# Patient Record
Sex: FEMALE | Race: WHITE | NOT HISPANIC OR LATINO | URBAN - METROPOLITAN AREA
[De-identification: names, ages, dates, MRNs, and addresses within clinical notes are randomized per-mention and may not be internally consistent; named-entity substitution may affect disease eponyms.]

---

## 2017-06-04 ENCOUNTER — EMERGENCY (EMERGENCY)
Facility: HOSPITAL | Age: 77
LOS: 0 days | Discharge: HOME | End: 2017-06-04
Attending: EMERGENCY MEDICINE | Admitting: EMERGENCY MEDICINE
Payer: MEDICARE

## 2017-06-04 VITALS
SYSTOLIC BLOOD PRESSURE: 138 MMHG | RESPIRATION RATE: 18 BRPM | HEIGHT: 62 IN | HEART RATE: 86 BPM | DIASTOLIC BLOOD PRESSURE: 76 MMHG | WEIGHT: 220.02 LBS | TEMPERATURE: 99 F | OXYGEN SATURATION: 97 %
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
## 2017-06-04 DIAGNOSIS — S09.90XA UNSPECIFIED INJURY OF HEAD, INITIAL ENCOUNTER: ICD-10-CM

## 2017-06-04 DIAGNOSIS — Z96.651 PRESENCE OF RIGHT ARTIFICIAL KNEE JOINT: Chronic | ICD-10-CM

## 2017-06-04 DIAGNOSIS — S00.81XA ABRASION OF OTHER PART OF HEAD, INITIAL ENCOUNTER: ICD-10-CM
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
## 2017-06-04 DIAGNOSIS — S80.212A ABRASION, LEFT KNEE, INITIAL ENCOUNTER: ICD-10-CM

## 2017-06-04 DIAGNOSIS — S20.212A CONTUSION OF LEFT FRONT WALL OF THORAX, INITIAL ENCOUNTER: ICD-10-CM
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
## 2017-06-04 DIAGNOSIS — S60.222A CONTUSION OF LEFT HAND, INITIAL ENCOUNTER: ICD-10-CM

## 2017-06-04 LAB
APPEARANCE UR: CLEAR — SIGNIFICANT CHANGE UP
BACTERIA # UR AUTO: (no result)
BILIRUB UR-MCNC: NEGATIVE — SIGNIFICANT CHANGE UP
COLOR SPEC: YELLOW — SIGNIFICANT CHANGE UP
DIFF PNL FLD: NEGATIVE — SIGNIFICANT CHANGE UP
EPI CELLS # UR: SIGNIFICANT CHANGE UP
GLUCOSE UR QL: NEGATIVE MG/DL — SIGNIFICANT CHANGE UP
KETONES UR-MCNC: (no result)
LEUKOCYTE ESTERASE UR-ACNC: (no result)
NITRITE UR-MCNC: NEGATIVE — SIGNIFICANT CHANGE UP
PH UR: 7 — SIGNIFICANT CHANGE UP (ref 5–8)
PROT UR-MCNC: NEGATIVE MG/DL — SIGNIFICANT CHANGE UP
RBC CASTS # UR COMP ASSIST: NEGATIVE /HPF — SIGNIFICANT CHANGE UP (ref 0–4)
SP GR SPEC: 1.01 — SIGNIFICANT CHANGE UP (ref 1.01–1.02)
UROBILINOGEN FLD QL: NEGATIVE MG/DL — SIGNIFICANT CHANGE UP
WBC UR QL: SIGNIFICANT CHANGE UP

## 2017-06-04 PROCEDURE — 76377 3D RENDER W/INTRP POSTPROCES: CPT | Mod: 26

## 2017-06-04 PROCEDURE — 12011 RPR F/E/E/N/L/M 2.5 CM/<: CPT | Mod: LT

## 2017-06-04 PROCEDURE — 72125 CT NECK SPINE W/O DYE: CPT | Mod: 26

## 2017-06-04 PROCEDURE — 70450 CT HEAD/BRAIN W/O DYE: CPT | Mod: 26

## 2017-06-04 PROCEDURE — 71101 X-RAY EXAM UNILAT RIBS/CHEST: CPT | Mod: 26

## 2017-06-04 PROCEDURE — 73130 X-RAY EXAM OF HAND: CPT | Mod: 26,LT

## 2017-06-04 PROCEDURE — 99284 EMERGENCY DEPT VISIT MOD MDM: CPT

## 2017-06-04 RX ORDER — TETANUS TOXOID, REDUCED DIPHTHERIA TOXOID AND ACELLULAR PERTUSSIS VACCINE, ADSORBED 5; 2.5; 8; 8; 2.5 [IU]/.5ML; [IU]/.5ML; UG/.5ML; UG/.5ML; UG/.5ML
0.5 SUSPENSION INTRAMUSCULAR ONCE
Qty: 0 | Refills: 0 | Status: COMPLETED | OUTPATIENT
Start: 2017-06-04 | End: 2017-06-04

## 2017-06-04 RX ADMIN — TETANUS TOXOID, REDUCED DIPHTHERIA TOXOID AND ACELLULAR PERTUSSIS VACCINE, ADSORBED 0.5 MILLILITER(S): 5; 2.5; 8; 8; 2.5 SUSPENSION INTRAMUSCULAR at 15:30

## 2017-06-04 NOTE — ED PROVIDER NOTE - CONSTITUTIONAL, MLM
normal... Well appearing, well nourished, elderly white female, awake, alert, oriented to person, place, time/situation and in no apparent distress. No respiratory discomfort. Non-toxic.

## 2017-06-04 NOTE — ED PROVIDER NOTE - CARE PLAN
Principal Discharge DX:	Injury of head, initial encounter  Secondary Diagnosis:	Contusion of rib, left, initial encounter  Secondary Diagnosis:	Abrasions of multiple sites

## 2017-06-04 NOTE — ED PROVIDER NOTE - NS ED MD SCRIBE ATTENDING SCRIBE SECTIONS
PHYSICAL EXAM/PROGRESS NOTE/DISPOSITION/HISTORY OF PRESENT ILLNESS/PAST MEDICAL/SURGICAL/SOCIAL HISTORY/RESULTS/REVIEW OF SYSTEMS

## 2017-06-04 NOTE — ED PROVIDER NOTE - CARDIAC, MLM
Normal rate, regular rhythm.  Heart sounds S1, S2.  No murmurs, rubs or gallops. Radial pulse is 2+. Normal rate, regular rhythm.  Heart sounds S1, S2.  No murmurs, rubs or gallops. Radial pulse is 2+. DP pulse is 2+.

## 2017-06-04 NOTE — ED PROVIDER NOTE - OBJECTIVE STATEMENT
76 y/o F with hx of HTN and right knee replacement presents to the ED s/p mechanical fall. Pt states she [place] and tripped on uneven pavement and hit her head. Denies LOC. Pt c/o left hand pain, left forehead abrasion, and left knee abrasion. Pt able to get herself up and ambulate after fall. Currently pt has no other complaints and denies nausea, blurry vision, CP, and SOB.

## 2017-06-04 NOTE — ED PROVIDER NOTE - ENMT, MLM
Airway patent, Nasal mucosa clear. Mouth with mildly dry mucosa. Throat has no vesicles, no oropharyngeal exudates and uvula is midline. +Dentures. No other clinical evidence of facial injury. Neck is supple, non-tender.

## 2017-06-04 NOTE — ED PROVIDER NOTE - DETAILS:
The scribe's documentation has been prepared under my direction and personally reviewed by me in its entirety. I confirm that the note above accurately reflects all work, treatment, procedures, and medical decision making performed by me (Dr. Rodríguez).

## 2017-06-04 NOTE — ED PROVIDER NOTE - HEAD, MLM
Head is atraumatic. Head shape is symmetrical. +Left forehead with mild swelling and superficial abrasions and small cut to lateral aspect of left eyebrow, mild oozing of blood.

## 2017-06-04 NOTE — ED PROVIDER NOTE - PROGRESS NOTE DETAILS
Dr. Rodríguez:  Wound closure by PA appreciated.  Pt self-ambulatory w/ steady gait, no pain.  Will D/C for PCP f/u.

## 2017-06-04 NOTE — ED PROVIDER NOTE - MEDICAL DECISION MAKING DETAILS
Elderly F presents s/p mechanical fall with forehead injury, contusion to left knee, left hand, left chest wall with associated abrasions, no LOC, no anticoags/asa. CT head, xrays of chest, ribs, left hand (pt refuses knee xray), TDAP, wound closure left eyebrow region.

## 2017-06-04 NOTE — ED PROVIDER NOTE - MUSCULOSKELETAL, MLM
+Lateral left and inferior rib tenderness. Spine appears normal, no midline CTL spine tenderness, range of motion is not limited, no muscle or joint tenderness. MAEx4. No posterior thoracic wall tenderness, nor deformity. +Lateral left and inferior rib tenderness. Spine appears normal, no midline CTL spine tenderness, range of motion is not limited, no muscle or joint tenderness. MAEx4. No posterior thoracic wall tenderness, nor deformity. B/l SLR 45 degrees without pain. Active FROM of b/l knees, non-tender, joint stable. Pelvis is stable and non-tender.

## 2017-06-04 NOTE — ED PROVIDER NOTE - SKIN, MLM
Skin normal color for race, warm, dry and intact. No evidence of rash. +Superficial abrasions to left knee. +Abrasions dorsum of left hand overlying MCP joint 3, 4, and 5. No evidence of rash.

## 2018-03-30 ENCOUNTER — EMERGENCY (EMERGENCY)
Facility: HOSPITAL | Age: 78
LOS: 1 days | Discharge: HOME | End: 2018-03-30
Attending: EMERGENCY MEDICINE
Payer: COMMERCIAL

## 2018-03-30 VITALS
WEIGHT: 184.97 LBS | SYSTOLIC BLOOD PRESSURE: 149 MMHG | OXYGEN SATURATION: 98 % | DIASTOLIC BLOOD PRESSURE: 82 MMHG | TEMPERATURE: 98 F | HEART RATE: 121 BPM | RESPIRATION RATE: 18 BRPM

## 2018-03-30 VITALS
SYSTOLIC BLOOD PRESSURE: 141 MMHG | DIASTOLIC BLOOD PRESSURE: 79 MMHG | RESPIRATION RATE: 20 BRPM | HEART RATE: 89 BPM | OXYGEN SATURATION: 94 %

## 2018-03-30 DIAGNOSIS — Z96.651 PRESENCE OF RIGHT ARTIFICIAL KNEE JOINT: Chronic | ICD-10-CM

## 2018-03-30 LAB
ALBUMIN SERPL ELPH-MCNC: 4.4 G/DL — SIGNIFICANT CHANGE UP (ref 3.3–5)
ALP SERPL-CCNC: 44 U/L — SIGNIFICANT CHANGE UP (ref 40–120)
ALT FLD-CCNC: 14 U/L RC — SIGNIFICANT CHANGE UP (ref 10–45)
ANION GAP SERPL CALC-SCNC: 14 MMOL/L — SIGNIFICANT CHANGE UP (ref 5–17)
APTT BLD: 31.4 SEC — SIGNIFICANT CHANGE UP (ref 27.5–37.4)
AST SERPL-CCNC: 22 U/L — SIGNIFICANT CHANGE UP (ref 10–40)
BASOPHILS # BLD AUTO: 0 K/UL — SIGNIFICANT CHANGE UP (ref 0–0.2)
BASOPHILS NFR BLD AUTO: 0.1 % — SIGNIFICANT CHANGE UP (ref 0–2)
BILIRUB SERPL-MCNC: 0.4 MG/DL — SIGNIFICANT CHANGE UP (ref 0.2–1.2)
BUN SERPL-MCNC: 17 MG/DL — SIGNIFICANT CHANGE UP (ref 7–23)
CALCIUM SERPL-MCNC: 9.4 MG/DL — SIGNIFICANT CHANGE UP (ref 8.4–10.5)
CHLORIDE SERPL-SCNC: 97 MMOL/L — SIGNIFICANT CHANGE UP (ref 96–108)
CO2 SERPL-SCNC: 25 MMOL/L — SIGNIFICANT CHANGE UP (ref 22–31)
CREAT SERPL-MCNC: 0.59 MG/DL — SIGNIFICANT CHANGE UP (ref 0.5–1.3)
EOSINOPHIL # BLD AUTO: 0 K/UL — SIGNIFICANT CHANGE UP (ref 0–0.5)
EOSINOPHIL NFR BLD AUTO: 0.3 % — SIGNIFICANT CHANGE UP (ref 0–6)
GLUCOSE SERPL-MCNC: 126 MG/DL — HIGH (ref 70–99)
HCT VFR BLD CALC: 41.7 % — SIGNIFICANT CHANGE UP (ref 34.5–45)
HGB BLD-MCNC: 14 G/DL — SIGNIFICANT CHANGE UP (ref 11.5–15.5)
INR BLD: 1 RATIO — SIGNIFICANT CHANGE UP (ref 0.88–1.16)
LACTATE BLDV-MCNC: 1.5 MMOL/L — SIGNIFICANT CHANGE UP (ref 0.7–2)
LYMPHOCYTES # BLD AUTO: 0.8 K/UL — LOW (ref 1–3.3)
LYMPHOCYTES # BLD AUTO: 7.5 % — LOW (ref 13–44)
MCHC RBC-ENTMCNC: 31 PG — SIGNIFICANT CHANGE UP (ref 27–34)
MCHC RBC-ENTMCNC: 33.5 GM/DL — SIGNIFICANT CHANGE UP (ref 32–36)
MCV RBC AUTO: 92.4 FL — SIGNIFICANT CHANGE UP (ref 80–100)
MONOCYTES # BLD AUTO: 0.4 K/UL — SIGNIFICANT CHANGE UP (ref 0–0.9)
MONOCYTES NFR BLD AUTO: 3.4 % — SIGNIFICANT CHANGE UP (ref 2–14)
NEUTROPHILS # BLD AUTO: 9.8 K/UL — HIGH (ref 1.8–7.4)
NEUTROPHILS NFR BLD AUTO: 88.8 % — HIGH (ref 43–77)
PLATELET # BLD AUTO: 227 K/UL — SIGNIFICANT CHANGE UP (ref 150–400)
POTASSIUM SERPL-MCNC: 3.7 MMOL/L — SIGNIFICANT CHANGE UP (ref 3.5–5.3)
POTASSIUM SERPL-SCNC: 3.7 MMOL/L — SIGNIFICANT CHANGE UP (ref 3.5–5.3)
PROT SERPL-MCNC: 7.5 G/DL — SIGNIFICANT CHANGE UP (ref 6–8.3)
PROTHROM AB SERPL-ACNC: 10.9 SEC — SIGNIFICANT CHANGE UP (ref 9.8–12.7)
RBC # BLD: 4.52 M/UL — SIGNIFICANT CHANGE UP (ref 3.8–5.2)
RBC # FLD: 12.1 % — SIGNIFICANT CHANGE UP (ref 10.3–14.5)
SODIUM SERPL-SCNC: 136 MMOL/L — SIGNIFICANT CHANGE UP (ref 135–145)
WBC # BLD: 11 K/UL — HIGH (ref 3.8–10.5)
WBC # FLD AUTO: 11 K/UL — HIGH (ref 3.8–10.5)

## 2018-03-30 PROCEDURE — 74177 CT ABD & PELVIS W/CONTRAST: CPT | Mod: 26

## 2018-03-30 PROCEDURE — 99284 EMERGENCY DEPT VISIT MOD MDM: CPT

## 2018-03-30 NOTE — ED PROVIDER NOTE - MEDICAL DECISION MAKING DETAILS
Att - fall onto R side 2nd to acute vertigo - pain but no tenderness R flank. ? hepatic injury. --> CT Ab/Pel, labs.

## 2018-03-30 NOTE — ED PROVIDER NOTE - PHYSICAL EXAMINATION
Attn - alert, mod-severe pain.  Head - NC/AT, PERRL, moist mm, lungs-, no splinting, cor - rr, no M, chest/ribs - nontender to compression.  abdo - soft, NT, ND, obese.  Pelvis - NT, Back - NT.  Pain R flank and RUQ without tenderness.  Upper ext-, lower ext-, neuro - intact and nonfocal, no vertigo now, no nystagmus.

## 2018-03-30 NOTE — ED PROVIDER NOTE - OBJECTIVE STATEMENT
77F PMH HTN, Cervical spondylosis melanoma s/p resection, vertigo s/p vestibular therapy, R knee replacement who presents 77F PMH HTN, Cervical spondylosis melanoma s/p resection, vertigo s/p vestibular therapy, R knee replacement who presents s/p fall with R sided pain. She went to [facility] this afternoon and came home and developed acute vertigo at home and fell on the floor. She endorsed worsening R sided pain since that time. Her vertigo has resolved. She primarily endorses pain in the abdomen more-so than the bones/hips. Denies fevers/chills, incontinence, focal weakness.    PMD: Dr. Estiven Glover  Meds: Amlodipine, trazadone, paroxitine, calcium carbonate 77F PMH HTN, Cervical spondylosis melanoma s/p resection, vertigo s/p vestibular therapy, R knee replacement who presents s/p fall with R sided pain. She went to [facility] this afternoon and came home and developed acute vertigo at home and fell on the floor. She endorsed worsening R sided pain since that time. Her vertigo has resolved. She primarily endorses pain in the abdomen more-so than the bones/hips. Denies fevers/chills, incontinence, focal weakness.    PMD: Dr. Estiven Glover  Meds: Amlodipine, trazadone, paroxitine, calcium carbonate      Attn - pt seen in [facility] - pt came home from [facility] and became acutely vertiginous and fell onto R side approx 5pm today.  Pt c/o severe pain r flank and exac by mvm.  no change with respiration.  NO: head, neck, spine, lower ext or upper ext pain.  Hx of vertigo lasting mins to week.  previously had vestibular therapy Rx by PMD - Donna Edmonds.  NO: cp, sob, palp, h/a, n/v.

## 2018-03-30 NOTE — ED ADULT NURSE NOTE - OBJECTIVE STATEMENT
Presents c/o right ribcage pain s/p fall. Pt states she had  a "vertigo attack" and fell. Reports sudden onset of dizziness immediately preceding fall. Pt now c/o right sided body pain increasing with movement. Pt A&Ox3. LEONARD. Denies cp/sob. Respirations even/unlabored. Abd soft. +ttp. No n/v/d. Denies urinary symptoms. Skin WDI. Awaiting CT scan and dispo.

## 2018-03-30 NOTE — ED PROVIDER NOTE - PMH
Anxiety    Cataract    [place] (hard of hearing) right ear    HTN (hypertension)    HTN (hypertension)    Obesity Anxiety    Cataract    [place] (hard of hearing) right ear    HTN (hypertension)    HTN (hypertension)    Obesity

## 2018-03-30 NOTE — ED ADULT NURSE NOTE - PMH
Anxiety    Cataract    [place] (hard of hearing) right ear    HTN (hypertension)    HTN (hypertension)    Obesity

## 2018-03-30 NOTE — ED PROVIDER NOTE - PSH
H/O total knee replacement, right    surgical repair of left Rotator cuff 2009    surgical repair of right foot Bunion and hammer toe 2010    wide excision of left thigh Melanoma with removal of left groin lymh nodes  1980

## 2018-03-30 NOTE — ED PROVIDER NOTE - PROGRESS NOTE DETAILS
Will order CT A/P to rule out any intra-abdomenal traumatic pathology Scans are negative, labs wnl. Will have patient f/u with PMD and give strict return precautions. -SM

## 2018-03-31 PROCEDURE — 74177 CT ABD & PELVIS W/CONTRAST: CPT

## 2018-03-31 PROCEDURE — 85027 COMPLETE CBC AUTOMATED: CPT

## 2018-03-31 PROCEDURE — 85730 THROMBOPLASTIN TIME PARTIAL: CPT

## 2018-03-31 PROCEDURE — 80053 COMPREHEN METABOLIC PANEL: CPT

## 2018-03-31 PROCEDURE — 82803 BLOOD GASES ANY COMBINATION: CPT

## 2018-03-31 PROCEDURE — 85610 PROTHROMBIN TIME: CPT

## 2018-03-31 PROCEDURE — 83605 ASSAY OF LACTIC ACID: CPT

## 2018-03-31 PROCEDURE — 99284 EMERGENCY DEPT VISIT MOD MDM: CPT | Mod: 25

## 2018-03-31 PROCEDURE — 96374 THER/PROPH/DIAG INJ IV PUSH: CPT | Mod: XU

## 2018-03-31 RX ORDER — FAMOTIDINE 10 MG/ML
20 INJECTION INTRAVENOUS ONCE
Qty: 0 | Refills: 0 | Status: COMPLETED | OUTPATIENT
Start: 2018-03-31 | End: 2018-03-31

## 2018-03-31 RX ADMIN — FAMOTIDINE 20 MILLIGRAM(S): 10 INJECTION INTRAVENOUS at 01:17

## 2018-08-27 NOTE — ED ADULT NURSE NOTE - RESPIRATORY WDL
Breathing spontaneous and unlabored. Breath sounds clear and equal bilaterally with regular rhythm. Implemented All Universal Safety Interventions:  [place] to call system. Call bell, personal items and telephone within reach. Instruct patient to call for assistance. Room bathroom lighting operational. Non-slip footwear when patient is off stretcher. Physically safe environment: no spills, clutter or unnecessary equipment. Stretcher in lowest position, wheels locked, appropriate side rails in place.

## 2019-09-23 ENCOUNTER — RESULT REVIEW (OUTPATIENT)
Age: 79
End: 2019-09-23

## 2019-11-30 NOTE — ED ADULT NURSE NOTE - NURSING SKIN WOUND TYPE #1
The following letter pertains to your most recent diagnostic tests:    Iron stores are adequate.    Triglycerides are improved.  Keep taking the fenofibrate as you are.        Sincerely,    Dr. Hollingsworth
hematoma

## 2020-10-28 ENCOUNTER — RESULT REVIEW (OUTPATIENT)
Age: 80
End: 2020-10-28

## 2022-01-07 ENCOUNTER — INPATIENT (INPATIENT)
Facility: HOSPITAL | Age: 82
LOS: 4 days | Discharge: HOME | DRG: 42 | End: 2022-01-12
Attending: HOSPITALIST | Admitting: STUDENT IN AN ORGANIZED HEALTH CARE EDUCATION/TRAINING PROGRAM
Payer: MEDICARE

## 2022-01-07 VITALS
TEMPERATURE: 98 F | SYSTOLIC BLOOD PRESSURE: 147 MMHG | HEIGHT: 62 IN | WEIGHT: 195.11 LBS | DIASTOLIC BLOOD PRESSURE: 77 MMHG | OXYGEN SATURATION: 95 % | RESPIRATION RATE: 20 BRPM | HEART RATE: 76 BPM

## 2022-01-07 DIAGNOSIS — Z96.651 PRESENCE OF RIGHT ARTIFICIAL KNEE JOINT: Chronic | ICD-10-CM

## 2022-01-07 PROBLEM — I10 ESSENTIAL (PRIMARY) HYPERTENSION: Chronic | Status: ACTIVE | Noted: 2017-06-04

## 2022-01-07 LAB
BASOPHILS # BLD AUTO: 0.03 K/UL — SIGNIFICANT CHANGE UP (ref 0–0.2)
BASOPHILS NFR BLD AUTO: 0.5 % — SIGNIFICANT CHANGE UP (ref 0–2)
EOSINOPHIL # BLD AUTO: 0.16 K/UL — SIGNIFICANT CHANGE UP (ref 0–0.5)
EOSINOPHIL NFR BLD AUTO: 2.6 % — SIGNIFICANT CHANGE UP (ref 0–6)
HCT VFR BLD CALC: 35.1 % — SIGNIFICANT CHANGE UP (ref 34.5–45)
HGB BLD-MCNC: 11.3 G/DL — LOW (ref 11.5–15.5)
IMM GRANULOCYTES NFR BLD AUTO: 0.2 % — SIGNIFICANT CHANGE UP (ref 0–1.5)
LYMPHOCYTES # BLD AUTO: 0.96 K/UL — LOW (ref 1–3.3)
LYMPHOCYTES # BLD AUTO: 15.8 % — SIGNIFICANT CHANGE UP (ref 13–44)
MCHC RBC-ENTMCNC: 30.4 PG — SIGNIFICANT CHANGE UP (ref 27–34)
MCHC RBC-ENTMCNC: 32.2 GM/DL — SIGNIFICANT CHANGE UP (ref 32–36)
MCV RBC AUTO: 94.4 FL — SIGNIFICANT CHANGE UP (ref 80–100)
MONOCYTES # BLD AUTO: 0.85 K/UL — SIGNIFICANT CHANGE UP (ref 0–0.9)
MONOCYTES NFR BLD AUTO: 14 % — SIGNIFICANT CHANGE UP (ref 2–14)
NEUTROPHILS # BLD AUTO: 4.06 K/UL — SIGNIFICANT CHANGE UP (ref 1.8–7.4)
NEUTROPHILS NFR BLD AUTO: 66.9 % — SIGNIFICANT CHANGE UP (ref 43–77)
NRBC # BLD: 0 /100 WBCS — SIGNIFICANT CHANGE UP (ref 0–0)
PLATELET # BLD AUTO: 187 K/UL — SIGNIFICANT CHANGE UP (ref 150–400)
RBC # BLD: 3.72 M/UL — LOW (ref 3.8–5.2)
RBC # FLD: 13.2 % — SIGNIFICANT CHANGE UP (ref 10.3–14.5)
WBC # BLD: 6.07 K/UL — SIGNIFICANT CHANGE UP (ref 3.8–10.5)
WBC # FLD AUTO: 6.07 K/UL — SIGNIFICANT CHANGE UP (ref 3.8–10.5)

## 2022-01-07 PROCEDURE — 73630 X-RAY EXAM OF FOOT: CPT | Mod: 26,LT

## 2022-01-07 PROCEDURE — 70450 CT HEAD/BRAIN W/O DYE: CPT | Mod: 26,MA

## 2022-01-07 PROCEDURE — 73030 X-RAY EXAM OF SHOULDER: CPT | Mod: 26,RT

## 2022-01-07 PROCEDURE — 70486 CT MAXILLOFACIAL W/O DYE: CPT | Mod: 26,MA

## 2022-01-07 PROCEDURE — 93010 ELECTROCARDIOGRAM REPORT: CPT

## 2022-01-07 PROCEDURE — 76377 3D RENDER W/INTRP POSTPROCES: CPT | Mod: 26

## 2022-01-07 PROCEDURE — 99285 EMERGENCY DEPT VISIT HI MDM: CPT

## 2022-01-07 PROCEDURE — 72125 CT NECK SPINE W/O DYE: CPT | Mod: 26,MA

## 2022-01-07 RX ORDER — ACETAMINOPHEN 500 MG
975 TABLET ORAL ONCE
Refills: 0 | Status: COMPLETED | OUTPATIENT
Start: 2022-01-07 | End: 2022-01-07

## 2022-01-07 RX ORDER — TETANUS TOXOID, REDUCED DIPHTHERIA TOXOID AND ACELLULAR PERTUSSIS VACCINE, ADSORBED 5; 2.5; 8; 8; 2.5 [IU]/.5ML; [IU]/.5ML; UG/.5ML; UG/.5ML; UG/.5ML
0.5 SUSPENSION INTRAMUSCULAR ONCE
Refills: 0 | Status: COMPLETED | OUTPATIENT
Start: 2022-01-07 | End: 2022-01-07

## 2022-01-07 NOTE — ED PROVIDER NOTE - PROGRESS NOTE DETAILS
Scott Vargas PGY2: Pt mildly unsteady on feet s/p concussion, lives with  who has Alzheimer's. Pt accepted for admission to hospitalist.

## 2022-01-07 NOTE — ED PROVIDER NOTE - ATTENDING CONTRIBUTION TO CARE
------------ATTENDING NOTE------------  pt c/o mechanical trip/fall > 24 hrs ago, hit face on ground, no LOC, ambulatory afterward, c/o mild dull throbbing pain in face w/ bruising/swelling, no visual changes, no neck/back pain, no chest pain/discomfort or sob/dyspnea or palpitations, nml neuro exam, nml cardiac exam, equal distal pulses, soft benign abd, stable chest w/o crepitus/distress, ambulating in ED w/o assistance but unsteady -->  - Tony Quintero MD   ---------------------------------------------- ------------ATTENDING NOTE------------  pt c/o mechanical trip/fall > 24 hrs ago, hit face on ground, no LOC, ambulatory afterward, c/o mild dull throbbing pain in face w/ bruising/swelling, no visual changes, no neck/back pain, no chest pain/discomfort or sob/dyspnea or palpitations, nml neuro exam, nml cardiac exam, equal distal pulses, soft benign abd, stable chest w/o crepitus/distress, ambulating in ED w/o assistance but unsteady -->   - Tony Quintero MD   ---------------------------------------------- ------------ATTENDING NOTE------------  pt c/o mechanical trip/fall > 24 hrs ago, hit face on ground, no LOC, ambulatory afterward, c/o mild dull throbbing pain in face w/ bruising/swelling, no visual changes, no neck/back pain, no chest pain/discomfort or sob/dyspnea or palpitations, nml neuro exam, nml cardiac exam, equal distal pulses, soft benign abd, stable chest w/o crepitus/distress, ambulating in ED w/o assistance but unsteady -->  pt w/ concussion and L 1st toe fx, she is at home alone and daughter unable to care for her, needing admission for PT/OT, close reassessments, outpt needs assessments.  - Tony Quintero MD   ----------------------------------------------

## 2022-01-07 NOTE — ED PROVIDER NOTE - CARE PLAN
Principal Discharge DX:	Head injury, closed, with concussion  Secondary Diagnosis:	Contusion of shoulder, right  Secondary Diagnosis:	Sprain of left foot   1 Principal Discharge DX:	Head injury, closed, with concussion  Secondary Diagnosis:	Contusion of shoulder, right  Secondary Diagnosis:	Sprain of left foot  Secondary Diagnosis:	Fracture of left great toe

## 2022-01-07 NOTE — ED ADULT NURSE NOTE - NSICDXPASTMEDICALHX_GEN_ALL_CORE_FT
PAST MEDICAL HISTORY:  Anxiety     Cataract     [place] (hard of hearing) right ear     HTN (hypertension)     HTN (hypertension)     Obesity

## 2022-01-07 NOTE — ED ADULT NURSE NOTE - NSICDXPASTSURGICALHX_GEN_ALL_CORE_FT
PAST SURGICAL HISTORY:  H/O total knee replacement, right     surgical repair of left Rotator cuff 2009     surgical repair of right foot Bunion and hammer toe 2010     wide excision of left thigh Melanoma with removal of left groin lymh nodes 1980

## 2022-01-07 NOTE — ED PROVIDER NOTE - OBJECTIVE STATEMENT
82yo F HTN, obesity comes to ED for fall. Fall 1/6 around 5pm (24 hrs ago). Was taking groceries downstairs and thinks she slipped down the stairs. Questionable LOC, was able to get up afterwards and ambulate. Came in today at insistence of her daughter.  Complaining of pain in her face w/ significant bruising. Denies HA, vomiting, change in vision, cp, sob, abdominal pain, or issues with urine or bowel. No recent illnesses. No AC.

## 2022-01-07 NOTE — ED PROVIDER NOTE - PHYSICAL EXAMINATION
General: NAD  HEENT: NC, PERRL, -cspine ttp +ecchymosis around bilateral orbits, EOMI, +mild ttp of the maxilla bilaterally and of the nasal bridge.  Cardiac: RRR, no murmurs, 2+ peripheral pulses  Chest: CTA, -clavicular ttp or deformities  Abdomen: soft, non-distended, bowel sounds present, no ttp, no rebound or guarding  Back: no midline tenderness  Extremities: no peripheral edema or leg size discrepancies, +stable pelvis, -tenderness of the greater trochanters.   Skin: no rashes  Neuro: AAOx4, cns intact, motor equal, sensory grossly intact, no dysmetria, antalgic gait w/ 1 person assistance, avoidant of L foot.  Psych: mood and affect appropriate

## 2022-01-07 NOTE — ED ADULT NURSE NOTE - OBJECTIVE STATEMENT
Pt A&Ox4, ambulatory at baseline. Pt presented to ED after fall yesterday. Pmhx HTN, anxiety, cataract, obesity. Pt reports yesterday evening she when trying to bring in groceries. Fell down a few stairs and hit face on ground. Denies LOC and was able to ambulate after fall without issue. Pt currently complains of pain and swelling of face. Pt denies CP, SON, N/V/D, dizziness, LOC, weakness, numbness, tingling, fever, chills, visual changes.

## 2022-01-07 NOTE — ED PROVIDER NOTE - NSFOLLOWUPINSTRUCTIONS_ED_ALL_ED_FT
See your Primary Doctor this week for follow up -- call to discuss.    Stay well hydrated, eat regular healthy meals, get plenty of rest, continue current medications.    Use Acetaminophen as directed for pain -- see medication warnings.    See CONCUSSION and FALL AFTERCARE information and return instructions given to you.    Seek immediate medical care for new/worsening symptoms/concerns..

## 2022-01-08 ENCOUNTER — TRANSCRIPTION ENCOUNTER (OUTPATIENT)
Age: 82
End: 2022-01-08

## 2022-01-08 DIAGNOSIS — S00.83XA CONTUSION OF OTHER PART OF HEAD, INITIAL ENCOUNTER: ICD-10-CM

## 2022-01-08 DIAGNOSIS — F41.9 ANXIETY DISORDER, UNSPECIFIED: ICD-10-CM

## 2022-01-08 DIAGNOSIS — S92.919A UNSPECIFIED FRACTURE OF UNSPECIFIED TOE(S), INITIAL ENCOUNTER FOR CLOSED FRACTURE: ICD-10-CM

## 2022-01-08 DIAGNOSIS — E04.9 NONTOXIC GOITER, UNSPECIFIED: ICD-10-CM

## 2022-01-08 DIAGNOSIS — S06.0X9A CONCUSSION WITH LOSS OF CONSCIOUSNESS OF UNSPECIFIED DURATION, INITIAL ENCOUNTER: ICD-10-CM

## 2022-01-08 DIAGNOSIS — I10 ESSENTIAL (PRIMARY) HYPERTENSION: ICD-10-CM

## 2022-01-08 DIAGNOSIS — E78.5 HYPERLIPIDEMIA, UNSPECIFIED: ICD-10-CM

## 2022-01-08 DIAGNOSIS — Z29.9 ENCOUNTER FOR PROPHYLACTIC MEASURES, UNSPECIFIED: ICD-10-CM

## 2022-01-08 LAB
ALBUMIN SERPL ELPH-MCNC: 4 G/DL — SIGNIFICANT CHANGE UP (ref 3.3–5)
ALBUMIN SERPL ELPH-MCNC: 4 G/DL — SIGNIFICANT CHANGE UP (ref 3.3–5)
ALP SERPL-CCNC: 50 U/L — SIGNIFICANT CHANGE UP (ref 40–120)
ALP SERPL-CCNC: 50 U/L — SIGNIFICANT CHANGE UP (ref 40–120)
ALT FLD-CCNC: 26 U/L — SIGNIFICANT CHANGE UP (ref 10–45)
ALT FLD-CCNC: 28 U/L — SIGNIFICANT CHANGE UP (ref 10–45)
ANION GAP SERPL CALC-SCNC: 11 MMOL/L — SIGNIFICANT CHANGE UP (ref 5–17)
ANION GAP SERPL CALC-SCNC: 13 MMOL/L — SIGNIFICANT CHANGE UP (ref 5–17)
APPEARANCE UR: CLEAR — SIGNIFICANT CHANGE UP
AST SERPL-CCNC: 33 U/L — SIGNIFICANT CHANGE UP (ref 10–40)
AST SERPL-CCNC: 38 U/L — SIGNIFICANT CHANGE UP (ref 10–40)
BACTERIA # UR AUTO: NEGATIVE — SIGNIFICANT CHANGE UP
BILIRUB SERPL-MCNC: 0.4 MG/DL — SIGNIFICANT CHANGE UP (ref 0.2–1.2)
BILIRUB SERPL-MCNC: 0.7 MG/DL — SIGNIFICANT CHANGE UP (ref 0.2–1.2)
BILIRUB UR-MCNC: NEGATIVE — SIGNIFICANT CHANGE UP
BUN SERPL-MCNC: 13 MG/DL — SIGNIFICANT CHANGE UP (ref 7–23)
BUN SERPL-MCNC: 16 MG/DL — SIGNIFICANT CHANGE UP (ref 7–23)
CALCIUM SERPL-MCNC: 9.1 MG/DL — SIGNIFICANT CHANGE UP (ref 8.4–10.5)
CALCIUM SERPL-MCNC: 9.1 MG/DL — SIGNIFICANT CHANGE UP (ref 8.4–10.5)
CHLORIDE SERPL-SCNC: 104 MMOL/L — SIGNIFICANT CHANGE UP (ref 96–108)
CHLORIDE SERPL-SCNC: 104 MMOL/L — SIGNIFICANT CHANGE UP (ref 96–108)
CK SERPL-CCNC: 770 U/L — HIGH (ref 25–170)
CO2 SERPL-SCNC: 23 MMOL/L — SIGNIFICANT CHANGE UP (ref 22–31)
CO2 SERPL-SCNC: 23 MMOL/L — SIGNIFICANT CHANGE UP (ref 22–31)
COLOR SPEC: SIGNIFICANT CHANGE UP
CREAT SERPL-MCNC: 0.54 MG/DL — SIGNIFICANT CHANGE UP (ref 0.5–1.3)
CREAT SERPL-MCNC: 0.59 MG/DL — SIGNIFICANT CHANGE UP (ref 0.5–1.3)
DIFF PNL FLD: NEGATIVE — SIGNIFICANT CHANGE UP
EPI CELLS # UR: 5 /HPF — SIGNIFICANT CHANGE UP
GLUCOSE SERPL-MCNC: 106 MG/DL — HIGH (ref 70–99)
GLUCOSE SERPL-MCNC: 99 MG/DL — SIGNIFICANT CHANGE UP (ref 70–99)
GLUCOSE UR QL: NEGATIVE — SIGNIFICANT CHANGE UP
HCT VFR BLD CALC: 35.8 % — SIGNIFICANT CHANGE UP (ref 34.5–45)
HGB BLD-MCNC: 11.4 G/DL — LOW (ref 11.5–15.5)
HYALINE CASTS # UR AUTO: 1 /LPF — SIGNIFICANT CHANGE UP (ref 0–2)
KETONES UR-MCNC: NEGATIVE — SIGNIFICANT CHANGE UP
LEUKOCYTE ESTERASE UR-ACNC: NEGATIVE — SIGNIFICANT CHANGE UP
MAGNESIUM SERPL-MCNC: 1.9 MG/DL — SIGNIFICANT CHANGE UP (ref 1.6–2.6)
MCHC RBC-ENTMCNC: 30.1 PG — SIGNIFICANT CHANGE UP (ref 27–34)
MCHC RBC-ENTMCNC: 31.8 GM/DL — LOW (ref 32–36)
MCV RBC AUTO: 94.5 FL — SIGNIFICANT CHANGE UP (ref 80–100)
NITRITE UR-MCNC: NEGATIVE — SIGNIFICANT CHANGE UP
NRBC # BLD: 0 /100 WBCS — SIGNIFICANT CHANGE UP (ref 0–0)
PH UR: 7 — SIGNIFICANT CHANGE UP (ref 5–8)
PLATELET # BLD AUTO: 183 K/UL — SIGNIFICANT CHANGE UP (ref 150–400)
POTASSIUM SERPL-MCNC: 3.6 MMOL/L — SIGNIFICANT CHANGE UP (ref 3.5–5.3)
POTASSIUM SERPL-MCNC: 3.9 MMOL/L — SIGNIFICANT CHANGE UP (ref 3.5–5.3)
POTASSIUM SERPL-SCNC: 3.6 MMOL/L — SIGNIFICANT CHANGE UP (ref 3.5–5.3)
POTASSIUM SERPL-SCNC: 3.9 MMOL/L — SIGNIFICANT CHANGE UP (ref 3.5–5.3)
PROT SERPL-MCNC: 6.4 G/DL — SIGNIFICANT CHANGE UP (ref 6–8.3)
PROT SERPL-MCNC: 6.4 G/DL — SIGNIFICANT CHANGE UP (ref 6–8.3)
PROT UR-MCNC: NEGATIVE — SIGNIFICANT CHANGE UP
RBC # BLD: 3.79 M/UL — LOW (ref 3.8–5.2)
RBC # FLD: 13.2 % — SIGNIFICANT CHANGE UP (ref 10.3–14.5)
RBC CASTS # UR COMP ASSIST: 1 /HPF — SIGNIFICANT CHANGE UP (ref 0–4)
SARS-COV-2 RNA SPEC QL NAA+PROBE: SIGNIFICANT CHANGE UP
SODIUM SERPL-SCNC: 138 MMOL/L — SIGNIFICANT CHANGE UP (ref 135–145)
SODIUM SERPL-SCNC: 140 MMOL/L — SIGNIFICANT CHANGE UP (ref 135–145)
SP GR SPEC: 1.01 — SIGNIFICANT CHANGE UP (ref 1.01–1.02)
TSH SERPL-MCNC: 1.22 UIU/ML — SIGNIFICANT CHANGE UP (ref 0.27–4.2)
UROBILINOGEN FLD QL: NEGATIVE — SIGNIFICANT CHANGE UP
WBC # BLD: 5.52 K/UL — SIGNIFICANT CHANGE UP (ref 3.8–10.5)
WBC # FLD AUTO: 5.52 K/UL — SIGNIFICANT CHANGE UP (ref 3.8–10.5)
WBC UR QL: 3 /HPF — SIGNIFICANT CHANGE UP (ref 0–5)

## 2022-01-08 PROCEDURE — 73560 X-RAY EXAM OF KNEE 1 OR 2: CPT | Mod: 26,LT

## 2022-01-08 PROCEDURE — 99223 1ST HOSP IP/OBS HIGH 75: CPT

## 2022-01-08 PROCEDURE — 73630 X-RAY EXAM OF FOOT: CPT | Mod: 26,LT

## 2022-01-08 PROCEDURE — 71045 X-RAY EXAM CHEST 1 VIEW: CPT | Mod: 26

## 2022-01-08 RX ORDER — ONDANSETRON 8 MG/1
4 TABLET, FILM COATED ORAL EVERY 8 HOURS
Refills: 0 | Status: DISCONTINUED | OUTPATIENT
Start: 2022-01-08 | End: 2022-01-12

## 2022-01-08 RX ORDER — LANOLIN ALCOHOL/MO/W.PET/CERES
3 CREAM (GRAM) TOPICAL AT BEDTIME
Refills: 0 | Status: DISCONTINUED | OUTPATIENT
Start: 2022-01-08 | End: 2022-01-12

## 2022-01-08 RX ORDER — AMLODIPINE BESYLATE AND BENAZEPRIL HYDROCHLORIDE 10; 20 MG/1; MG/1
1 CAPSULE ORAL
Qty: 0 | Refills: 0 | DISCHARGE

## 2022-01-08 RX ORDER — TRAMADOL HYDROCHLORIDE 50 MG/1
25 TABLET ORAL EVERY 8 HOURS
Refills: 0 | Status: DISCONTINUED | OUTPATIENT
Start: 2022-01-08 | End: 2022-01-12

## 2022-01-08 RX ORDER — ATORVASTATIN CALCIUM 80 MG/1
20 TABLET, FILM COATED ORAL AT BEDTIME
Refills: 0 | Status: DISCONTINUED | OUTPATIENT
Start: 2022-01-08 | End: 2022-01-12

## 2022-01-08 RX ORDER — LISINOPRIL 2.5 MG/1
20 TABLET ORAL DAILY
Refills: 0 | Status: DISCONTINUED | OUTPATIENT
Start: 2022-01-08 | End: 2022-01-12

## 2022-01-08 RX ORDER — SODIUM CHLORIDE 9 MG/ML
1000 INJECTION INTRAMUSCULAR; INTRAVENOUS; SUBCUTANEOUS
Refills: 0 | Status: DISCONTINUED | OUTPATIENT
Start: 2022-01-08 | End: 2022-01-12

## 2022-01-08 RX ORDER — ACETAMINOPHEN 500 MG
650 TABLET ORAL EVERY 6 HOURS
Refills: 0 | Status: DISCONTINUED | OUTPATIENT
Start: 2022-01-08 | End: 2022-01-12

## 2022-01-08 RX ORDER — TRAZODONE HCL 50 MG
50 TABLET ORAL AT BEDTIME
Refills: 0 | Status: DISCONTINUED | OUTPATIENT
Start: 2022-01-08 | End: 2022-01-12

## 2022-01-08 RX ORDER — TRAZODONE HCL 50 MG
1 TABLET ORAL
Qty: 0 | Refills: 0 | DISCHARGE

## 2022-01-08 RX ORDER — ATORVASTATIN CALCIUM 80 MG/1
1 TABLET, FILM COATED ORAL
Qty: 0 | Refills: 0 | DISCHARGE

## 2022-01-08 RX ORDER — AMLODIPINE BESYLATE 2.5 MG/1
5 TABLET ORAL DAILY
Refills: 0 | Status: DISCONTINUED | OUTPATIENT
Start: 2022-01-08 | End: 2022-01-12

## 2022-01-08 RX ADMIN — Medication 50 MILLIGRAM(S): at 21:15

## 2022-01-08 RX ADMIN — Medication 20 MILLIGRAM(S): at 11:09

## 2022-01-08 RX ADMIN — AMLODIPINE BESYLATE 5 MILLIGRAM(S): 2.5 TABLET ORAL at 05:57

## 2022-01-08 RX ADMIN — Medication 650 MILLIGRAM(S): at 20:10

## 2022-01-08 RX ADMIN — SODIUM CHLORIDE 75 MILLILITER(S): 9 INJECTION INTRAMUSCULAR; INTRAVENOUS; SUBCUTANEOUS at 10:08

## 2022-01-08 RX ADMIN — LISINOPRIL 20 MILLIGRAM(S): 2.5 TABLET ORAL at 05:57

## 2022-01-08 RX ADMIN — ATORVASTATIN CALCIUM 20 MILLIGRAM(S): 80 TABLET, FILM COATED ORAL at 21:14

## 2022-01-08 NOTE — DISCHARGE NOTE PROVIDER - CARE PROVIDER_API CALL
Phillip Holder (DO)  Cardiovascular Disease; Internal Medicine; Nuclear Cardiology  [address] [place] [address], [place] [address]  Phone: [phone]  Fax: [phone]  Follow Up Time:

## 2022-01-08 NOTE — H&P ADULT - PROBLEM SELECTOR PLAN 5
-c/w lipitor 20 mg incidental finding on CT;   thyroid gland is markedly enlarged with numerous nodules and calcifications, compatible with a thyroid goiter. The inferior extent of the goiter is not within the field-of-view  - f/up TSH

## 2022-01-08 NOTE — H&P ADULT - PROBLEM SELECTOR PLAN 3
CT maxillofacial w/  posttraumatic inflammatory changes of the left face and several small hematomas in the left premaxillary region and a small left periorbital hematoma  - continue to monitor  - tylenol prn pain  - holding pharmacologic dvt ppx

## 2022-01-08 NOTE — H&P ADULT - PROBLEM SELECTOR PLAN 4
- stable at admission  - home regimen: amlodipine-benezapril 5/20 mg; continue w/ amlodipine; benaprezil not available here, will subsitute w/ - stable at admission  - home regimen: amlodipine-benezapril 5/20 mg; continue w/ amlodipine; benaprezil not available here, will subsitute w/lisinopril 20 mg

## 2022-01-08 NOTE — DISCHARGE NOTE PROVIDER - NSDCMRMEDTOKEN_GEN_ALL_CORE_FT
amlodipine-benazepril 5 mg-20 mg oral capsule: 1 cap(s) orally once a day  Lipitor 20 mg oral tablet: 1 tab(s) orally once a day  PARoxetine 20 mg oral tablet: 1 tab(s) orally once a day  traZODone 50 mg oral tablet: 1 tab(s) orally once a day (at bedtime)   amlodipine-benazepril 5 mg-20 mg oral capsule: 1 cap(s) orally once a day  Lipitor 20 mg oral tablet: 1 tab(s) orally once a day  PARoxetine 20 mg oral tablet: 1 tab(s) orally once a day  traMADol 50 mg oral tablet: 0.5 tab(s) orally every 8 hours, As needed, Moderate Pain (4 - 6)  traZODone 50 mg oral tablet: 1 tab(s) orally once a day (at bedtime)   amlodipine-benazepril 5 mg-20 mg oral capsule: 1 cap(s) orally once a day  Lipitor 20 mg oral tablet: 1 tab(s) orally once a day  PARoxetine 20 mg oral tablet: 1 tab(s) orally once a day  Rolling walker : Rolling walker   traZODone 50 mg oral tablet: 1 tab(s) orally once a day (at bedtime)

## 2022-01-08 NOTE — DISCHARGE NOTE PROVIDER - NSDCCPCAREPLAN_GEN_ALL_CORE_FT
PRINCIPAL DISCHARGE DIAGNOSIS  Diagnosis: Head injury, closed, with concussion  Assessment and Plan of Treatment: Echo done  loop recorder implanted  follow up with Cardiology      SECONDARY DISCHARGE DIAGNOSES  Diagnosis: Contusion of shoulder, right  Assessment and Plan of Treatment:     Diagnosis: Sprain of left foot  Assessment and Plan of Treatment:     Diagnosis: Fracture of left great toe  Assessment and Plan of Treatment:

## 2022-01-08 NOTE — H&P ADULT - PROBLEM SELECTOR PLAN 7
DVT ppx: holding chemoppx in setting of acute facial hematomas - c/w paroxetine 20 mg and trazodone 50 mg qHs

## 2022-01-08 NOTE — H&P ADULT - NSHPSOCIALHISTORY_GEN_ALL_CORE
denies smoking and etoh  lives w/  who has Alzheimer's, pt is primary caretaker for   ambulates independently at baseline

## 2022-01-08 NOTE — H&P ADULT - HISTORY OF PRESENT ILLNESS
81 yr old female w/ PMH of htn, hld, anxiety, and cataracts presents to ED after fall. Per patient, she was going down the steps after brining in groceries and somehow tripped/fall, sliding down several steps. Pt unsure how long she was on the ground and unsure if she lost consciousness. Hit face on the ground, woke up in blood. Pt ambulatory afterwards; able to get herself off the ground. Able to ambulate but has pain; worst in the left big toe, R shoulder, and face. Denies neck, back and hip pain.   Denies preceding sob/dyspnea or palpitations.  Pt was able to ambulate in ED w/o assistance but unsteady; pt w/ concern for likely concussion and L 1st toe fx noted on imaging. 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

## 2022-01-08 NOTE — H&P ADULT - NSHPLABSRESULTS_GEN_ALL_CORE
Personally reviewed labs.   Personally reviewed imaging.   Personally reviewed EKG.                           11.3   6.07  )-----------( 187      ( 07 Jan 2022 23:44 )             35.1       140  |  104  |  16  ----------------------------<  106<H>  3.6   |  23  |  0.59    Ca    9.1      07 Jan 2022 23:44  Mg     1.9     01-07    TPro  6.4  /  Alb  4.0  /  TBili  0.4  /  DBili  x   /  AST  33  /  ALT  28  /  AlkPhos  50  01-07      LIVER FUNCTIONS - ( 07 Jan 2022 23:44 )  Alb: 4.0 g/dL / Pro: 6.4 g/dL / ALK PHOS: 50 U/L / ALT: 28 U/L / AST: 33 U/L / GGT: x           EKG- nsr w/o acute ischemic changes    CT head: No acute intracranial hemorrhage, mass effect or midline shift.    Maxillofacial CT:   No acute fracture. There are posttraumatic inflammatory   changes of the left face with several small hematomas in the left   premaxillary region. There is a small left periorbital hematoma present.  MAXILLOFACIAL CT:  FACIAL BONES:  No acute fracture. There is an old right nasal fracture   present..  MANDIBLE:  No acute fracture.  SINONASAL CAVITIES: Normal.  REMAINING VISUALIZED BONES: Normal.  Soft tissues: There are posttraumatic inflammatory changes of the left   face with several small hematomas in the left premaxillary region. There   is a small left periorbital hematoma present.      CT cervical spine:   No acute fracture or traumatic malalignment.  Multilevel degenerative changes.    The thyroid gland is markedly enlarged with numerous nodules and   calcifications, compatible with a thyroid goiter. The inferior extent of   the goiter is not within the field-of-view.      XR R Shoulder: No acute fracture. No dislocation. Mild AC joint arthrosis. Joint spaces   are otherwise maintained. No radiopaque foreign body. Patchy airspace   opacity for which dedicated chest radiograph is recommended.    XR L Foot:   Acute minimally displaced transverse fracture of the head of the left   first proximal phalanx. No additional fractures. No dislocation. Joint   spaces are maintained. Diffuse soft tissue swelling about the left foot   and ankle. No radiopaque foreign body.    CXR:   The heart is enlarged.  There are prominent perihilar vessels. No focal consolidations.  No pleural effusion or pneumothorax.

## 2022-01-08 NOTE — H&P ADULT - PROBLEM SELECTOR PLAN 2
XR foot w/ Acute minimally displaced fracture of the head of the left first proximal phalanx. Pt still acutely tender, pain w/ ambulation.  - WBAT  - podiatry to see XR foot w/ Acute minimally displaced fracture of the head of the left first proximal phalanx. Pt still acutely tender, pain w/ ambulation.  - WBAT  - podiatry to see  - surgical shoe ordered

## 2022-01-08 NOTE — H&P ADULT - NSHPREVIEWOFSYSTEMS_GEN_ALL_CORE
REVIEW OF SYSTEMS:  CONSTITUTIONAL: +fall; No weakness. No fevers. No chills. No rigors. No weight loss. No night sweats. No poor appetite.  EYES: +aria orbital hematomas; No blurry or double vision. No eye pain.  ENT: No hearing difficulty. No vertigo. No dysphagia. No sore throat. No Sinusitis/rhinorrhea. +nasal pain/ hematoma   NECK: No pain. No stiffness/rigidity.  CARDIAC: No chest pain. No palpitations. No lightheadedness. No syncope.  RESPIRATORY: No cough. No SOB. No hemoptysis.  GASTROINTESTINAL: No abdominal pain. No nausea. No vomiting. No hematemesis. No diarrhea. No constipation. No melena. No hematochezia.  GENITOURINARY: No dysuria. No frequency. No hesitancy. No hematuria. No oliguria.  NEUROLOGICAL: No numbness/tingling. No focal weakness. No urinary or fecal incontinence. No headache. No unsteady gait.  BACK: No back pain. No flank pain.  EXTREMITIES: +R shoulder and L ankle/foot pain; No lower extremity edema. Full ROM. No joint pain.  SKIN: No rashes. No itching. No other lesions.  PSYCHIATRIC: No depression. No anxiety. No SI/HI.  ALLERGIC: No lip swelling. No hives.  All other review of systems is negative unless indicated above.  Unless indicated above, unable to assess ROS 2/2

## 2022-01-08 NOTE — PHYSICAL THERAPY INITIAL EVALUATION ADULT - ADDITIONAL COMMENTS
Pt lives with her  in a private home. 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 There are 2 entry steps (+ rail), flight (+ rail) once inside. Pt was independent with all functional mobility prior to admission including community ambulation without a device & ADL's. Goal of therapy: go home.

## 2022-01-08 NOTE — CHART NOTE - NSCHARTNOTEFT_GEN_A_CORE
Pt seen and examined. NAD.   Left foot xray performed, f/u result.  Awaiting PT evaluation for disposition and dc planning.   Jean ACP

## 2022-01-08 NOTE — DISCHARGE NOTE PROVIDER - NSDCFUADDINST_GEN_ALL_CORE_FT
Podiatry Discharge Instructions:  Follow up: Please follow up with Dr. Hidalgo within 1 week of discharge from the hospital, please call [phone] for appointment and discuss that you recently were seen in the hospital.  Wound Care: Please leave your dressing clean dry intact until your follow up appointment.  Weight bearing: Please weight bear as tolerated to the left foot in a surgical shoe.  Antibiotics: Please continue as instructed. Podiatry Discharge Instructions:  Follow up: Please follow up with Dr. Hidalgo within 1 week of discharge from the hospital, please call [phone] for appointment and discuss that you recently were seen in the hospital.  Wound Care: Please leave your dressing clean dry intact until your follow up appointment.  Weight bearing: Please weight bear as tolerated to the left foot in a surgical shoe.  Antibiotics: Please continue as instructed.    Followup w/ Dr Holder in 2 weeks for ILR data monitoring and wound check - call for appointment    WOUND CARE:  Do NOT scrub, rub, or pick at your incision site  the glue will wear off in 5-7 days  do not get your incision wet for 3 days   AFTER 3 days you may shower:   - use mild soap and gentle warm stream, pat dry with towel  DO NOT apply lotions, powders, ointment, or perfumes to incision  wear loose clothing around incision for 7 days  f/u appointment as instructed     ACTIVITY:   resume normal activities    Follow heart healthy diet recommended by your doctor, , if you smoke STOP SMOKING ( may call [phone] for center of tobacco control if you need assistance)     ***CALL YOUR DOCTOR***  if you experience: fever, chills, body aches, or severe pain, swelling, redness, heat or yellow discharge at incision site  If you are unable to reach your doctor, you may contact Cardiology Office at [facility] at [phone]

## 2022-01-08 NOTE — H&P ADULT - NSTOBACCOSCREENHP_GEN_A_NCS
[facility]  Adult Nutrition  Progress Note    SUMMARY       Recommendations    Recommendation:   1. Continue custom TPN, recommend TPN of 210 gm dextrose, 73 AA + IV lipids to provide pt with 1506 kcal, 73 gm protein and GIR of 2.6.    Meet adequate EEN/EPN for pt.    Check triglycerides weekly.  2. As medically able, advance to renal diet with novasource renal to increase intake  3. RD to monitor and follow up     Goals: Meet % EEN, EPN  Nutrition Goal Status: progressing towards goal  Communication of RD Recs: reviewed with RN    Reason for Assessment    Reason For Assessment: RD follow-up  Diagnosis: other (see comments)(Hyponatremia)  Relevant Medical History: DM, HTN, Cirrhosis  Interdisciplinary Rounds: attended  General Information Comments: PT FELICITA at time of visit. No family in room. Pt diet downgraded to TPN, NPO. Pt with hematemesis last night, per chart progressivly worse. NG tube placed. EGD today. Pt started on TPN last night. Previous NFPE completed, pt with moderate malnutrition.   Nutrition Discharge Planning: Adequate PO intake    Nutrition Risk Screen    Nutrition Risk Screen: no indicators present    Nutrition/Diet History    Patient Reported Diet/Restrictions/Preferences: low salt  Spiritual, Cultural Beliefs, Muslim Practices, Values that Affect Care: no  Supplemental Drinks or Food Habits: Ensure Plus  Food Allergies: NKFA  Factors Affecting Nutritional Intake: NPO    Anthropometrics    Temp: 96.2 °F (35.7 °C)  Height Method: Stated  Height: 5' (152.4 cm)  Height (inches): 60 in  Weight Method: Bed Scale  Weight: 56 kg (123 lb 7.3 oz)  Weight (lb): 123.46 lb  Ideal Body Weight (IBW), Female: 100 lb  % Ideal Body Weight, Female (lb): 123.46 lb  BMI (Calculated): 24.2  BMI Grade: 18.5-24.9 - normal  Usual Body Weight (UBW), k kg  % Usual Body Weight: 93.68  % Weight Change From Usual Weight: -6.52 %    Lab/Procedures/Meds    Pertinent Labs Reviewed:  reviewed  Pertinent Labs Comments: Na 134; ; Creatinine 3.8; glucose 257; Phosphorus 6.3; Mg 3.7  Pertinent Medications Reviewed: reviewed  Pertinent Medications Comments: insulin; levothyroxine; pantoprazole; lipids; sodium bicarbonate; zinc sulfate     Estimated/Assessed Needs    Weight Used For Calorie Calculations: 56 kg (123 lb 7.3 oz)  Energy Calorie Requirements (kcal): 1680 kcal/d  Energy Need Method: Kcal/kg(30 kcal/kg)  Protein Requirements: 73 g/d (1.3 g/kg)  Weight Used For Protein Calculations: 56 kg (123 lb 7.3 oz)  Estimated Fluid Requirement Method: other (see comments)(Per MD or 1 mL/kcal)  RDA Method (mL): 1680    Nutrition Prescription Ordered    Current Diet Order: NPO  Current Nutrition Support Formula Ordered: Other (Comment)(Custom TPN )  Current Nutrition Support Rate Ordered: 42 (ml)  Current Nutrition Support Frequency Ordered: mL/hr     Evaluation of Received Nutrient/Fluid Intake    Parenteral Calories (kcal): 453  Parenteral Protein (gm): 27  Lipid Calories (kcals): 500 kcals  Total Calories (kcal): 953  % Kcal Needs: 56  % Protein Needs: 37  I/O: -7.7: since admit  Energy Calories Required: not meeting needs  Protein Required: not meeting needs  Comments: LBM 8/6  Tolerance: tolerating  % Intake of Estimated Energy Needs: 50 - 75 %  % Meal Intake: NPO    Nutrition Risk    Level of Risk/Frequency of Follow-up: high(2x/week)     Assessment and Plan    Moderate malnutrition    Malnutrition in the context of Chronic Illness/Injury    Related to (etiology):  Inadequate energy intake    Signs and Symptoms (as evidenced by):  Energy Intake: <75% of estimated energy requirement for 4 months   Weight Loss: 7% x 7 months   RD to complete NFPE at time of follow-up    Interventions/Recommendations (treatment strategy):  Collaboration of nutrition care w/ other providers     Nutrition Diagnosis Status:  Continues    Monitor and Evaluation    Food and Nutrient Intake: energy intake, food and  beverage intake  Food and Nutrient Adminstration: diet order  Physical Activity and Function: nutrition-related ADLs and IADLs  Anthropometric Measurements: weight, weight change  Biochemical Data, Medical Tests and Procedures: lipid profile, inflammatory profile, glucose/endocrine profile, gastrointestinal profile, electrolyte and renal panel  Nutrition-Focused Physical Findings: overall appearance     Malnutrition Assessment    Weight Loss (Malnutrition): other (see comments)(7% x 7 months)  Energy Intake (Malnutrition): less than 75% for greater than or equal to 3 months  Subcutaneous Fat (Malnutrition): other (see comments)(RD to complete NFPE at time of follow-up.)  Muscle Mass (Malnutrition): moderate depletion  Fluid Accumulation (Malnutrition): moderate   Orbital Region (Subcutaneous Fat Loss): moderate depletion  Upper Arm Region (Subcutaneous Fat Loss): moderate depletion   Mandaen Region (Muscle Loss): moderate depletion  Clavicle and Acromion Bone Region (Muscle Loss): moderate depletion     Nutrition Follow-Up    RD Follow-up?: Yes     No

## 2022-01-08 NOTE — CONSULT NOTE ADULT - ASSESSMENT
81F with left hallux fracture   - Patient seen and evaluated  - Afebrile, VSS  - Exam: Left hallux  tenderness to palpation to L hallux, L foot lesser digits ROM wnl, b/l hallux ecchymosis and redness, mild edema noted to b/l hallux *(L>R), no open lesions, no clinical signs of infection  - Left foot xray: minimally displace left foot 1st proximal phalanx fracture   - Closed reduced the left hallux as it was dorsally displaced from xray and applied betadine soaked splint followed by DSD. ost-reduction xrays ordered.  - Instructed patient to ambulate with surgical shoe   - Instructed patient to keep dressing clean dry and intact until follow up visit   - Patient is stable for discharge from podiatry standpoint   - Follow up information and instructions can be found in the follow up section of d/c provider note  - Discussed with attending

## 2022-01-08 NOTE — H&P ADULT - ASSESSMENT
81 yr old female w/ PMH of htn, hld, anxiety, and cataracts presents to ED after fall. Per patient, she was going down the steps after brining in groceries and somehow tripped/fall, sliding down several steps. Pt unsure how long she was on the ground and unsure if she lost consciousness. Hit face on the ground, woke up in blood. Pt ambulatory afterwards; able to get herself off the ground. Able to ambulate but has pain; worst in the left big toe, R shoulder, and face. Denies neck, back and hip pain.   Denies preceding sob/dyspnea or palpitations.  Pt was able to ambulate in ED w/o assistance but unsteady; pt w/ concern for likely concussion and L 1st toe fx noted on imaging. 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  1 yr old female w/ PMH of htn, hld, anxiety, and cataracts presents to ED after fall, w likely concussion. Imaging w/ noted multiple facial hematomas and left great toe. Pt able to ambulate in ED but unsteady.

## 2022-01-08 NOTE — DISCHARGE NOTE PROVIDER - HOSPITAL COURSE
82 y/o F with PMHx of HTN, obesity comes to ED for fall. Fall 1/6 around 5pm (24 hrs ago). Was taking groceries downstairs and thinks she slipped down the stairs. Questionable LOC, was able to get up afterwards and ambulate. Came in today at insistence of her daughter.  Complaining of pain in her face w/ significant bruising.  Dx: s/p Fall with + LOC - facial hematomas and small L periorbital hematoma.         Head concussion        Left 1st proximal phalanx fx s/p closed reduction, Surgical shoe        Rhabomyolysis s/p IVF x 10 hours    Pt had unwitnessed syncope with no prodrome, resulting in fall with significant musculoskeletal injuries. No history of palpitations. No AFib or significant bradycardia on telemetry.  Echo grossly within normal limits (normal LV ejection fraction, no severe valve dysfunction). A loop recorder was implanted for bradycardia surveillance after atypical syncopal event.  Followup w/ Dr Holder in 2 weeks for ILR data monitorin and wound check.    Discussed with MD - pt stable for discharge home, pt with no complaints, discharge medications reviewed with MD. 81 yr old female w/ PMH of htn, hld, anxiety, and cataracts presents to ED after fall, w likely concussion. Imaging w/ noted multiple facial hematomas and left great toe. S/p ILR  80 y/o F with PMHx of HTN, obesity comes to ED for fall. Fall 1/6 around 5pm (24 hrs ago). Was taking groceries downstairs and thinks she slipped down the stairs. Questionable LOC, was able to get up afterwards and ambulate. Came in today at insistence of her daughter.  Complaining of pain in her face w/ significant bruising.  Dx: s/p Fall with + LOC - facial hematomas and small L periorbital hematoma.         Head concussion        Left 1st proximal phalanx fx s/p closed reduction, Surgical shoe        Rhabomyolysis s/p IVF x 10 hours    Pt had unwitnessed syncope with no prodrome, resulting in fall with significant musculoskeletal injuries. No history of palpitations. No AFib or significant bradycardia on telemetry.  Echo grossly within normal limits (normal LV ejection fraction, no severe valve dysfunction). A loop recorder was implanted for bradycardia surveillance after atypical syncopal event.  Followup w/ Dr Holder in 2 weeks for ILR data monitorin and wound check.    Discussed with MD - pt stable for discharge home, pt with no complaints, discharge medications reviewed with MD.    Problem/Plan - 1:  ·  Problem: Head concussion.   ·  Plan: Pt does not recall how she fell and woke up suddenly. No prodrome.   Concern for Cardiogenic Syncope   CT head and maxiollofacial w/o acute trauma, noted hematomas as below  Echo with mild AS, normal lv function   Monitor on tele   D/w cards, S/p ILR  PT eval - no needs  pain control- tylenol prn mild pain, tramadol prn moderate pain  D/w daughter, pending SW discussion with daughter, wants to take her home.    Problem/Plan - 2:  ·  Problem: Fracture of phalanx of toe.   ·  Plan: XR foot w/ Acute minimally displaced fracture of the head of the left first proximal phalanx. Pt still acutely tender, pain w/ ambulation  WBAT  podiatry to follow up outpt in 1 week.  surgical shoe ordered.    Problem/Plan - 3:  ·  Problem: Facial hematoma, initial encounter.   ·  Plan: CT maxillofacial w/  posttraumatic inflammatory changes of the left face and several small hematomas in the left premaxillary region and a small left periorbital hematoma  continue to monitor  tylenol prn pain  holding pharmacologic dvt ppx.    Problem/Plan - 4:  ·  Problem: Essential hypertension.   ·  Plan: stable at admission  home regimen: amlodipine-benezapril 5/20 mg; continue w/ amlodipine; benaprezil not available here, will subsitute w/lisinopril 20 mg.    Problem/Plan - 5:  ·  Problem: Thyroid goiter.   ·  Plan: incidental finding on CT;   thyroid gland is markedly enlarged with numerous nodules and calcifications, compatible with a thyroid goiter. The inferior extent of the goiter is not within the field-of-view.    Problem/Plan - 6:  ·  Problem: Hyperlipidemia.   ·  Plan: c/w lipitor 20 mg.    Problem/Plan - 7:  ·  Problem: Anxiety.   ·  Plan: c/w paroxetine 20 mg and trazodone 50 mg qHs.    Problem/Plan - 8:  ·  Problem: Prophylactic measure.   ·  Plan: DVT ppx: holding chemoppx in setting of acute facial hematomas    Disposition: Dc home post ILR.     81 yr old female w/ PMH of htn, hld, anxiety, and cataracts presents to ED after fall, w likely concussion.    Problem/Plan - 1:  ·  Problem: Head concussion.   ·  Plan: Pt does not recall how she fell and woke up suddenly. No prodrome.   Concern for Cardiogenic Syncope   CT head and maxiollofacial w/o acute trauma, noted hematomas as below  Echo with mild AS, normal lv function   Monitor on tele   D/w cards, S/p ILR  PT eval - no needs  pain control- tylenol prn mild pain, tramadol prn moderate pain  D/w daughter, pending SW discussion with daughter, wants to take her home.    Problem/Plan - 2:  ·  Problem: Fracture of phalanx of toe.   ·  Plan: XR foot w/ Acute minimally displaced fracture of the head of the left first proximal phalanx. Pt still acutely tender, pain w/ ambulation  WBAT  podiatry to follow up outpt in 1 week.  surgical shoe ordered.    Problem/Plan - 3:  ·  Problem: Facial hematoma, initial encounter.   ·  Plan: CT maxillofacial w/  posttraumatic inflammatory changes of the left face and several small hematomas in the left premaxillary region and a small left periorbital hematoma  continue to monitor  tylenol prn pain  holding pharmacologic dvt ppx.    Problem/Plan - 4:  ·  Problem: Essential hypertension.   ·  Plan: stable at admission  home regimen: amlodipine-benezapril 5/20 mg; continue w/ amlodipine; benaprezil not available here, will subsitute w/lisinopril 20 mg.    Problem/Plan - 5:  ·  Problem: Thyroid goiter.   ·  Plan: incidental finding on CT;   thyroid gland is markedly enlarged with numerous nodules and calcifications, compatible with a thyroid goiter. The inferior extent of the goiter is not within the field-of-view.    Problem/Plan - 6:  ·  Problem: Hyperlipidemia.   ·  Plan: c/w lipitor 20 mg.    Problem/Plan - 7:  ·  Problem: Anxiety.   ·  Plan: c/w paroxetine 20 mg and trazodone 50 mg qHs.    Problem/Plan - 8:  ·  Problem: Prophylactic measure.   ·  Plan: DVT ppx: holding chemoppx in setting of acute facial hematomas    Also found to have traumatic rhabdomyolysis, resolved     Stable for dc home

## 2022-01-09 LAB — CK SERPL-CCNC: 479 U/L — HIGH (ref 25–170)

## 2022-01-09 PROCEDURE — 99233 SBSQ HOSP IP/OBS HIGH 50: CPT

## 2022-01-09 RX ORDER — SENNA PLUS 8.6 MG/1
2 TABLET ORAL AT BEDTIME
Refills: 0 | Status: DISCONTINUED | OUTPATIENT
Start: 2022-01-09 | End: 2022-01-12

## 2022-01-09 RX ADMIN — ATORVASTATIN CALCIUM 20 MILLIGRAM(S): 80 TABLET, FILM COATED ORAL at 21:24

## 2022-01-09 RX ADMIN — Medication 20 MILLIGRAM(S): at 12:43

## 2022-01-09 RX ADMIN — SENNA PLUS 2 TABLET(S): 8.6 TABLET ORAL at 21:25

## 2022-01-09 RX ADMIN — Medication 50 MILLIGRAM(S): at 21:24

## 2022-01-09 RX ADMIN — AMLODIPINE BESYLATE 5 MILLIGRAM(S): 2.5 TABLET ORAL at 06:11

## 2022-01-09 RX ADMIN — LISINOPRIL 20 MILLIGRAM(S): 2.5 TABLET ORAL at 06:12

## 2022-01-09 NOTE — PROGRESS NOTE ADULT - SUBJECTIVE AND OBJECTIVE BOX
[facility] Division of Hospital Medicine  Ashlyn Krishnan MD  Pager (M-F, 8A-5P): [phone], MS Teams PREFERRED  Other Times:  [phone]      SUBJECTIVE / OVERNIGHT EVENTS:  ADDITIONAL REVIEW OF SYSTEMS:    MEDICATIONS  (STANDING):  amLODIPine   Tablet 5 milliGRAM(s) Oral daily  atorvastatin 20 milliGRAM(s) Oral at bedtime  lisinopril 20 milliGRAM(s) Oral daily  PARoxetine 20 milliGRAM(s) Oral daily  sodium chloride 0.9%. 1000 milliLiter(s) (75 mL/Hr) IV Continuous <Continuous>  traZODone 50 milliGRAM(s) Oral at bedtime    MEDICATIONS  (PRN):  acetaminophen     Tablet .. 650 milliGRAM(s) Oral every 6 hours PRN Temp greater or equal to 38C (100.4F), Mild Pain (1 - 3)  aluminum hydroxide/magnesium hydroxide/simethicone Suspension 30 milliLiter(s) Oral every 4 hours PRN Dyspepsia  melatonin 3 milliGRAM(s) Oral at bedtime PRN Insomnia  ondansetron Injectable 4 milliGRAM(s) IV Push every 8 hours PRN Nausea and/or Vomiting  traMADol 25 milliGRAM(s) Oral every 8 hours PRN Moderate Pain (4 - 6)      I&O's Summary    2022 07:01  -  2022 07:00  --------------------------------------------------------  IN: 240 mL / OUT: 0 mL / NET: 240 mL    2022 07:01  -  2022 15:21  --------------------------------------------------------  IN: 720 mL / OUT: 0 mL / NET: 720 mL        PHYSICAL EXAM:  Vital Signs Last 24 Hrs  T(C): 36.7 (2022 12:54), Max: 37.2 (2022 04:40)  T(F): 98 (2022 12:54), Max: 98.9 (2022 04:40)  HR: 71 (2022 12:54) (71 - 81)  BP: 122/74 (2022 12:54) (117/67 - 130/76)  BP(mean): --  RR: 18 (2022 12:54) (18 - 18)  SpO2: 98% (2022 12:54) (93% - 98%)  CONSTITUTIONAL: NAD, well-developed, well-groomed  EYES: PERRLA; conjunctiva and sclera clear  ENMT: Moist oral mucosa, no pharyngeal injection or exudates;   NECK: Supple, no palpable masses;   RESPIRATORY: Normal respiratory effort; lungs are clear to auscultation bilaterally  CARDIOVASCULAR: Regular rate and rhythm, normal S1 and S2, no murmur/rub/gallop; No lower extremity edema;  ABDOMEN: Nontender to palpation, normoactive bowel sounds, no rebound/guarding;   MUSCULOSKELETAL:  Normal gait; no clubbing or cyanosis of digits; no joint swelling or tenderness to palpation  PSYCH: A+O to person, place, and time; affect appropriate  NEUROLOGY: CN 2-12 are intact and symmetric; no gross sensory deficits   SKIN: No rashes; no palpable lesions; Bruising on the left cheek, yellowish purple. Scab on the nose where some skin abrasion/laceration now healing and bilateral bruising underneath the eyes.     LABS:                        11.4   5.52  )-----------( 183      ( 2022 07:12 )             35.8     01-08    138  |  104  |  13  ----------------------------<  99  3.9   |  23  |  0.54    Ca    9.1      2022 07:12  Mg     1.9     01-07    TPro  6.4  /  Alb  4.0  /  TBili  0.7  /  DBili  x   /  AST  38  /  ALT  26  /  AlkPhos  50  01-08      CARDIAC MARKERS ( 2022 07:37 )  x     / x     / 479 U/L / x     / x      CARDIAC MARKERS ( 2022 07:12 )  x     / x     / 770 U/L / x     / x          Urinalysis Basic - ( 2022 06:57 )    Color: Light Yellow / Appearance: Clear / S.013 / pH: x  Gluc: x / Ketone: Negative  / Bili: Negative / Urobili: Negative   Blood: x / Protein: Negative / Nitrite: Negative   Leuk Esterase: Negative / RBC: 1 /hpf / WBC 3 /HPF   Sq Epi: x / Non Sq Epi: 5 /hpf / Bacteria: Negative          COORDINATION OF CARE:  Care Discussed with Consultants/Other Providers [Y/N]: D/w cardiology, plan for ILR

## 2022-01-10 LAB
ANION GAP SERPL CALC-SCNC: 10 MMOL/L — SIGNIFICANT CHANGE UP (ref 5–17)
BUN SERPL-MCNC: 22 MG/DL — SIGNIFICANT CHANGE UP (ref 7–23)
CALCIUM SERPL-MCNC: 9.2 MG/DL — SIGNIFICANT CHANGE UP (ref 8.4–10.5)
CHLORIDE SERPL-SCNC: 106 MMOL/L — SIGNIFICANT CHANGE UP (ref 96–108)
CO2 SERPL-SCNC: 24 MMOL/L — SIGNIFICANT CHANGE UP (ref 22–31)
CREAT SERPL-MCNC: 0.7 MG/DL — SIGNIFICANT CHANGE UP (ref 0.5–1.3)
GLUCOSE SERPL-MCNC: 89 MG/DL — SIGNIFICANT CHANGE UP (ref 70–99)
HCT VFR BLD CALC: 32.7 % — LOW (ref 34.5–45)
HGB BLD-MCNC: 10.4 G/DL — LOW (ref 11.5–15.5)
MCHC RBC-ENTMCNC: 29.7 PG — SIGNIFICANT CHANGE UP (ref 27–34)
MCHC RBC-ENTMCNC: 31.8 GM/DL — LOW (ref 32–36)
MCV RBC AUTO: 93.4 FL — SIGNIFICANT CHANGE UP (ref 80–100)
NRBC # BLD: 0 /100 WBCS — SIGNIFICANT CHANGE UP (ref 0–0)
PLATELET # BLD AUTO: 192 K/UL — SIGNIFICANT CHANGE UP (ref 150–400)
POTASSIUM SERPL-MCNC: 4.1 MMOL/L — SIGNIFICANT CHANGE UP (ref 3.5–5.3)
POTASSIUM SERPL-SCNC: 4.1 MMOL/L — SIGNIFICANT CHANGE UP (ref 3.5–5.3)
RBC # BLD: 3.5 M/UL — LOW (ref 3.8–5.2)
RBC # FLD: 13.1 % — SIGNIFICANT CHANGE UP (ref 10.3–14.5)
SODIUM SERPL-SCNC: 140 MMOL/L — SIGNIFICANT CHANGE UP (ref 135–145)
WBC # BLD: 4.85 K/UL — SIGNIFICANT CHANGE UP (ref 3.8–10.5)
WBC # FLD AUTO: 4.85 K/UL — SIGNIFICANT CHANGE UP (ref 3.8–10.5)

## 2022-01-10 PROCEDURE — 93306 TTE W/DOPPLER COMPLETE: CPT | Mod: 26

## 2022-01-10 PROCEDURE — 99233 SBSQ HOSP IP/OBS HIGH 50: CPT

## 2022-01-10 RX ADMIN — LISINOPRIL 20 MILLIGRAM(S): 2.5 TABLET ORAL at 05:48

## 2022-01-10 RX ADMIN — ATORVASTATIN CALCIUM 20 MILLIGRAM(S): 80 TABLET, FILM COATED ORAL at 21:39

## 2022-01-10 RX ADMIN — Medication 20 MILLIGRAM(S): at 12:13

## 2022-01-10 RX ADMIN — SENNA PLUS 2 TABLET(S): 8.6 TABLET ORAL at 21:38

## 2022-01-10 RX ADMIN — AMLODIPINE BESYLATE 5 MILLIGRAM(S): 2.5 TABLET ORAL at 05:49

## 2022-01-10 RX ADMIN — Medication 3 MILLIGRAM(S): at 21:39

## 2022-01-10 RX ADMIN — Medication 50 MILLIGRAM(S): at 21:38

## 2022-01-10 NOTE — CONSULT NOTE ADULT - SUBJECTIVE AND OBJECTIVE BOX
DATE OF SERVICE: 01-09-22      CHIEF COMPLAINT:Patient is a 81y old  Female who presents with a chief complaint of fall, concussion, unsteady gait (09 Jan 2022 15:21)      HISTORY OF PRESENT ILLNESS:HPI:  81 yr old female w/ PMH of htn, hld, anxiety, and cataracts presents to ED after fall. Per patient, she was going down the steps after brining in groceries and somehow tripped/fall, sliding down several steps. Pt unsure how long she was on the ground and unsure if she lost consciousness. Hit face on the ground, woke up in blood. Pt ambulatory afterwards; able to get herself off the ground. Able to ambulate but has pain; worst in the left big toe, R shoulder, and face. Denies neck, back and hip pain.   Denies preceding sob/dyspnea or palpitations.  Pt was able to ambulate in ED w/o assistance but unsteady; pt w/ concern for likely concussion and L 1st toe fx noted on imaging. 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    (08 Jan 2022 05:18)      PAST MEDICAL & SURGICAL HISTORY:  HTN (hypertension)    Anxiety    Obesity    Cataract    [place] (hard of hearing) right ear    HTN (hypertension)    surgical repair of left Rotator cuff 2009    surgical repair of right foot Bunion and hammer toe 2010    wide excision of left thigh Melanoma with removal of left groin lymh nodes  1980    H/O total knee replacement, right            MEDICATIONS:  amLODIPine   Tablet 5 milliGRAM(s) Oral daily  lisinopril 20 milliGRAM(s) Oral daily        acetaminophen     Tablet .. 650 milliGRAM(s) Oral every 6 hours PRN  melatonin 3 milliGRAM(s) Oral at bedtime PRN  ondansetron Injectable 4 milliGRAM(s) IV Push every 8 hours PRN  PARoxetine 20 milliGRAM(s) Oral daily  traMADol 25 milliGRAM(s) Oral every 8 hours PRN  traZODone 50 milliGRAM(s) Oral at bedtime    aluminum hydroxide/magnesium hydroxide/simethicone Suspension 30 milliLiter(s) Oral every 4 hours PRN  senna 2 Tablet(s) Oral at bedtime    atorvastatin 20 milliGRAM(s) Oral at bedtime    sodium chloride 0.9%. 1000 milliLiter(s) IV Continuous <Continuous>      FAMILY HISTORY:      Non-contributory    SOCIAL HISTORY:    [ ] not a smoker    Allergies    No Known Allergies    Intolerances    	    REVIEW OF SYSTEMS:  CONSTITUTIONAL: No fever  EYES: No eye pain, visual disturbances, or discharge  ENMT:  No difficulty hearing, tinnitus  NECK: No pain or stiffness  RESPIRATORY: No cough, wheezing,  CARDIOVASCULAR: No chest pain, palpitations, dizziness, or leg swelling, +syncope  GASTROINTESTINAL:  No nausea, vomiting, diarrhea or constipation. No melena.  GENITOURINARY: No dysuria, hematuria  NEUROLOGICAL: No stroke like symptoms  SKIN: No burning or lesions   ENDOCRINE: No heat or cold intolerance  MUSCULOSKELETAL: No joint pain or swelling  PSYCHIATRIC: No  anxiety, mood swings  HEME/LYMPH: No bleeding gums  ALLERGY AND IMMUNOLOGIC: No hives or eczema	      All other ROS negative    PHYSICAL EXAM:  T(C): 37.1 (01-09-22 @ 16:43), Max: 37.2 (01-09-22 @ 04:40)  HR: 71 (01-09-22 @ 16:43) (71 - 79)  BP: 114/69 (01-09-22 @ 16:43) (114/69 - 129/70)  RR: 16 (01-09-22 @ 16:43) (16 - 18)  SpO2: 95% (01-09-22 @ 16:43) (93% - 98%)  Wt(kg): --  I&O's Summary    08 Jan 2022 07:01  -  09 Jan 2022 07:00  --------------------------------------------------------  IN: 240 mL / OUT: 0 mL / NET: 240 mL    09 Jan 2022 07:01  -  09 Jan 2022 21:50  --------------------------------------------------------  IN: 720 mL / OUT: 0 mL / NET: 720 mL        Appearance: Normal	  HEENT:   Normal oral mucosa, EOMI	  Cardiovascular:  S1 S2, No JVD,    Respiratory: Lungs clear to auscultation	  Psychiatry: Alert  Gastrointestinal:  Soft, Non-tender, + BS	  Skin: No rashes   Neurologic: Non-focal  Extremities:  No edema  Vascular: Peripheral pulses palpable    	    	  	  CARDIAC MARKERS:  Labs personally reviewed by me                                  11.4   5.52  )-----------( 183      ( 08 Jan 2022 07:12 )             35.8     01-08    138  |  104  |  13  ----------------------------<  99  3.9   |  23  |  0.54    Ca    9.1      08 Jan 2022 07:12  Mg     1.9     01-07    TPro  6.4  /  Alb  4.0  /  TBili  0.7  /  DBili  x   /  AST  38  /  ALT  26  /  AlkPhos  50  01-08          EKG: Personally reviewed by me - SR with 1st degree AVB  Radiology: Personally reviewed by me - cxr clar lungs      Assessment and Plan:   · Assessment	  1 yr old female w/ PMH of htn, hld, anxiety, and cataracts presents to ED after fall, w likely concussion. Imaging w/ noted multiple facial hematomas and left great toe. Pt able to ambulate in ED but unsteady.     Problem/Plan - 1:  ·  Problem: Traumatic syncope   ·  Plan: Pt does not recall how she fell and woke up suddenly. No prodrome. Highly suspicious for cardiogenic syncope  - discussed ILR with pt who agrees, EP Dr Garcia consulted, plan for ILR in AM  - echo to r/o structural heart disease, severe AS etc..      Problem/Plan - 2:  ·  Problem: Facial hematoma, initial encounter.   ·  Plan: CT maxillofacial w/  posttraumatic inflammatory changes of the left face and several small hematomas in the left premaxillary region and a small left periorbital hematoma  - continue to monitor  - tylenol prn pain  - holding pharmacologic dvt ppx.    Problem/Plan - 3:  ·  Problem: Essential hypertension.   ·  Plan: - stable at admission  - home regimen: amlodipine-benezapril 5/20 mg; continue hospital equivalent     Problem/Plan - 4:  ·  Problem: Hyperlipidemia.   ·  Plan: -c/w lipitor 20 mg.    Problem/Plan - 5:  ·  Problem: Prophylactic measure.   ·  Plan: DVT ppx: holding chemoppx in setting of acute facial hematomas      Differential diagnosis and plan of care discussed with patient after the evaluation. Counseling on diet, nutritional counseling, weight management, exercise and medication compliance was done.   Advanced care planning/advanced directives discussed with patient/family. DNR status including forceful chest compressions to attempt to restart the heart, ventilator support/artificial breathing, electric shock, artificial nutrition, health care proxy, Molst form all discussed with pt. Pt wishes to consider. More than fifteen minutes spent on discussing advanced directives.       Phillip Holder DO [facility]  Cardiovascular Medicine  [address] [place] [address], [address]  Office [phone]  Cell [phone]
Podiatry pager #: [phone]/ [phone]    Patient is a 81y old  Female who presents with a chief complaint of fall, concussion, unsteady gait (08 Jan 2022 05:18)      HPI:  81 yr old female w/ PMH of htn, hld, anxiety, and cataracts presents to ED after fall. Per patient, she was going down the steps after brining in groceries and somehow tripped/fall, sliding down several steps. Pt unsure how long she was on the ground and unsure if she lost consciousness. Hit face on the ground, woke up in blood. Pt ambulatory afterwards; able to get herself off the ground. Able to ambulate but has pain; worst in the left big toe, R shoulder, and face. Denies neck, back and hip pain.   Denies preceding sob/dyspnea or palpitations.  Pt was able to ambulate in ED w/o assistance but unsteady; pt w/ concern for likely concussion and L 1st toe fx noted on imaging. 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    (08 Jan 2022 05:18)      PAST MEDICAL & SURGICAL HISTORY:  HTN (hypertension)    Anxiety    Obesity    Cataract    [place] (hard of hearing) right ear    HTN (hypertension)    surgical repair of left Rotator cuff 2009    surgical repair of right foot Bunion and hammer toe 2010    wide excision of left thigh Melanoma with removal of left groin lymh nodes  1980    H/O total knee replacement, right        MEDICATIONS  (STANDING):  amLODIPine   Tablet 5 milliGRAM(s) Oral daily  atorvastatin 20 milliGRAM(s) Oral at bedtime  lisinopril 20 milliGRAM(s) Oral daily  PARoxetine 20 milliGRAM(s) Oral daily  traZODone 50 milliGRAM(s) Oral at bedtime    MEDICATIONS  (PRN):  acetaminophen     Tablet .. 650 milliGRAM(s) Oral every 6 hours PRN Temp greater or equal to 38C (100.4F), Mild Pain (1 - 3)  aluminum hydroxide/magnesium hydroxide/simethicone Suspension 30 milliLiter(s) Oral every 4 hours PRN Dyspepsia  melatonin 3 milliGRAM(s) Oral at bedtime PRN Insomnia  ondansetron Injectable 4 milliGRAM(s) IV Push every 8 hours PRN Nausea and/or Vomiting  traMADol 25 milliGRAM(s) Oral every 8 hours PRN Moderate Pain (4 - 6)      Allergies    No Known Allergies    Intolerances        VITALS:    Vital Signs Last 24 Hrs  T(C): 36.7 (08 Jan 2022 05:30), Max: 37.1 (07 Jan 2022 22:10)  T(F): 98 (08 Jan 2022 05:30), Max: 98.7 (07 Jan 2022 22:10)  HR: 85 (08 Jan 2022 05:30) (76 - 85)  BP: 130/80 (08 Jan 2022 05:30) (125/71 - 147/77)  BP(mean): --  RR: 18 (08 Jan 2022 05:30) (18 - 20)  SpO2: 95% (08 Jan 2022 05:30) (94% - 95%)    LABS:                          11.4   5.52  )-----------( 183      ( 08 Jan 2022 07:12 )             35.8       01-07    140  |  104  |  16  ----------------------------<  106<H>  3.6   |  23  |  0.59    Ca    9.1      07 Jan 2022 23:44  Mg     1.9     01-07    TPro  6.4  /  Alb  4.0  /  TBili  0.4  /  DBili  x   /  AST  33  /  ALT  28  /  AlkPhos  50  01-07      CAPILLARY BLOOD GLUCOSE              LOWER EXTREMITY PHYSICAL EXAM:    Vasular: DP 1/4, B/L, PT non-palpable, CFT <3 seconds B/L, Temperature gradient warm to cool, B/L.   Neuro: Epicritic sensation intact to the level of ankle, B/L.  Musculoskeletal/Ortho: underlapping hallux to 2nd digit b/l, b/l HAV, tenderness to palpation to L hallux, lesser digits ROM wnl   Skin: b/l hallux ecchymosis and redness, mild edema noted to b/l hallux *(L>R), no open lesions, no clinical signs of infection      RADIOLOGY & ADDITIONAL STUDIES:    < from: Xray Foot AP + Lateral + Oblique, Left (01.07.22 @ 20:30) >    ACC: [id number] EXAM:  XR FOOT COMP MIN 3 VIEWS LT                          PROCEDURE DATE:  01/07/2022          INTERPRETATION:  CLINICAL INDICATION: Left foot pain status post trauma    TECHNIQUE: AP, oblique and lateral views of the left foot.    COMPARISON: None available.    FINDINGS:    Acute minimally displaced transverse fracture of the head of the left   first proximal phalanx. No additional fractures. No dislocation. Joint   spaces are maintained. Diffuse soft tissue swelling about the left foot   and ankle. No radiopaque foreign body.      IMPRESSION:  Acute minimally displaced fracture of the head of the left first proximal   phalanx.    --- End of Report ---          MICHAEL TEIXEIRA MD; Resident Radiology  This document has beenelectronically signed.  WILLIAM VALDES MD; Attending Radiologist  This document has been electronically signed. Jan 7 2022  8:59PM    < end of copied text >  
EP ATTENDING      HISTORY OF PRESENT ILLNESS: She is a pleasant  y/o female PMH HTN a/w syncope resulting in facial trauma. Her EKG show a first degree AV delay with left axis deviation, but is otherwise unremarkable. Echo pending. She denies palpitations nor angina.    PAST MEDICAL & SURGICAL HISTORY:  HTN (hypertension)  Anxiety    surgical repair of left rotator cuff 2009  surgical repair of right foot Bunion and hammer toe 2010  wide excision of left thigh Melanoma with removal of left groin lymph nodes 1980  H/O total knee replacement, right          MEDICATIONS  (STANDING):  amLODIPine   Tablet 5 milliGRAM(s) Oral daily  atorvastatin 20 milliGRAM(s) Oral at bedtime  lisinopril 20 milliGRAM(s) Oral daily  PARoxetine 20 milliGRAM(s) Oral daily  senna 2 Tablet(s) Oral at bedtime  sodium chloride 0.9%. 1000 milliLiter(s) (75 mL/Hr) IV Continuous <Continuous>  traZODone 50 milliGRAM(s) Oral at bedtime      Allergies    No Known Allergies    Intolerances        FAMILY HISTORY:    Non-contributary for premature coronary disease or sudden cardiac death    SOCIAL HISTORY:    [ x] Non-smoker  [ ] Smoker  [ ] Alcohol      REVIEW OF SYSTEMS:  [ ]chest pain  [  ]shortness of breath  [  ]palpitations  [ x ]syncope  [ ]near syncope [ ]upper extremity weakness   [ ] lower extremity weakness  [  ]diplopia  [  ]altered mental status   [  ]fevers  [ ]chills [ ]nausea  [ ]vomitting  [  ]dysphagia    [ ]abdominal pain  [ ]melena  [ ]BRBPR    [  ]epistaxis  [  ]rash    [ ]lower extremity edema        [ x] All others negative	  [ ] Unable to obtain    PHYSICAL EXAM:  T(C): 36.6 (01-10-22 @ 08:24), Max: 37.1 (01-09-22 @ 16:43)  HR: 75 (01-10-22 @ 08:24) (71 - 84)  BP: 136/77 (01-10-22 @ 08:24) (107/68 - 162/72)  RR: 18 (01-10-22 @ 08:24) (16 - 18)  SpO2: 93% (01-10-22 @ 08:24) (93% - 98%)  Wt(kg): --    Appearance: Normal	  HEENT:   Normal oral mucosa, PERRL, EOMI	  Lymphatic: No lymphadenopathy , no edema  Cardiovascular: Normal S1 S2, No JVD, No murmurs , Peripheral pulses palpable 2+ bilaterally  Respiratory: Lungs clear to auscultation, normal effort 	  Gastrointestinal:  Soft, Non-tender, + BS	  Skin: No rashes, No ecchymoses, No cyanosis, warm to touch  Musculoskeletal: Normal range of motion, normal strength  Psychiatry:  Mood & affect appropriate      TELEMETRY: 	    ECG:  	    Echo:  NST:  Cath:  	  	  LABS:	 	                          10.4   4.85  )-----------( 192      ( 10 Jose 2022 06:53 )             32.7     01-10    140  |  106  |  22  ----------------------------<  89  4.1   |  24  |  0.70    Ca    9.2      10 Jose 2022 06:51      proBNP:   Lipid Profile:   HgA1c:   TSH:     A/P) She is a pleasant  y/o female PMH HTN a/w syncope resulting in facial trauma. Her EKG show a first degree AV delay with left axis deviation, but is otherwise unremarkable. Echo pending. She denies palpitations nor angina.    -get echo  -transfer to telemetry  -if echo and tele unremarkable then agree with a loop recorder      Sara Garcia M.D., RS  Cardiac Electrophysiology  [facility]  [address] [place] [address], [address] [place] [id number]  [email or web address]    office [phone]  pager [phone]

## 2022-01-10 NOTE — PROGRESS NOTE ADULT - SUBJECTIVE AND OBJECTIVE BOX
Date of Service   01-10-22 @ 12:40    Patient is a 81y old  Female who presents with a chief complaint of fall, concussion, unsteady gait (10 Jose 2022 12:16)      INTERVAL HISTORY: pt feels ok     TELEMETRY Personally reviewed:     REVIEW OF SYSTEMS:   CONSTITUTIONAL: No weakness  EYES/ENT: No visual changes; No throat pain  Neck: No pain or stiffness  Respiratory: No cough, wheezing, No shortness of breath  CARDIOVASCULAR: no chest pain or palpitations  GASTROINTESTINAL: No abdominal pain, no nausea, vomiting or hematemesis  GENITOURINARY: No dysuria, frequency or hematuria  NEUROLOGICAL: No stroke like symptoms  SKIN: general facial bruising   	  MEDICATIONS:  amLODIPine   Tablet 5 milliGRAM(s) Oral daily  lisinopril 20 milliGRAM(s) Oral daily        PHYSICAL EXAM:  T(C): 36.6 (01-10-22 @ 08:24), Max: 37.1 (01-09-22 @ 16:43)  HR: 75 (01-10-22 @ 08:24) (71 - 84)  BP: 136/77 (01-10-22 @ 08:24) (107/68 - 162/72)  RR: 18 (01-10-22 @ 08:24) (16 - 18)  SpO2: 93% (01-10-22 @ 08:24) (93% - 98%)  Wt(kg): --  I&O's Summary    09 Jan 2022 07:01  -  10 Jose 2022 07:00  --------------------------------------------------------  IN: 1540 mL / OUT: 0 mL / NET: 1540 mL    10 Jose 2022 07:01  -  10 Jose 2022 12:40  --------------------------------------------------------  IN: 360 mL / OUT: 0 mL / NET: 360 mL          Appearance: In no distress	  HEENT:    PERRL, EOMI	  Cardiovascular:  S1 S2, No JVD  Respiratory: Lungs clear to auscultation	  Gastrointestinal:  Soft, Non-tender, + BS	  Vascularature:  No edema of LE  Psychiatric: Appropriate affect   Neuro: no acute focal deficits                               10.4   4.85  )-----------( 192      ( 10 Jose 2022 06:53 )             32.7     01-10    140  |  106  |  22  ----------------------------<  89  4.1   |  24  |  0.70    Ca    9.2      10 Jose 2022 06:51          Labs personally reviewed      ASSESSMENT/PLAN: 	  1 yr old female w/ PMH of htn, hld, anxiety, and cataracts presents to ED after fall, w likely concussion. Imaging w/ noted multiple facial hematomas and left great toe. Pt able to ambulate in ED but unsteady.     Problem/Plan - 1:  ·  Problem: Traumatic syncope   ·  Plan: Pt does not recall how she fell and woke up suddenly. No prodrome. Highly suspicious for cardiogenic syncope  - discussed ILR with pt who agrees, EP Dr Garcia consulted, plan for ILR today   - echo to r/o structural heart disease, severe AS etc.. pending     Problem/Plan - 2:  ·  Problem: Facial hematoma, initial encounter.   - CT maxillofacial w/  posttraumatic inflammatory changes of the left face and several small hematomas in the left premaxillary region and a small left periorbital hematoma  - continue to monitor  - tylenol prn pain  - holding pharmacologic DVT ppx.    Problem/Plan - 3:  ·  Problem: Essential hypertension.   ·  Plan: - stable at admission  - home regimen: amlodipine-benezapril 5/20 mg; continue hospital equivalent     Problem/Plan - 4:  ·  Problem: Hyperlipidemia.   - c/w lipitor 20 mg.    Problem/Plan - 5:  ·  Problem: Prophylactic measure.   - DVT ppx: holding chemoppx in setting of acute facial hematomas            Billy DUGAN-BC   Phillip Holder DO [facility]  Cardiovascular Medicine  [address], [address]  Office: [phone]  Cell: [phone] Date of Service   01-10-22 @ 12:40    Patient is a 81y old  Female who presents with a chief complaint of fall, concussion, unsteady gait (10 Jose 2022 12:16)      INTERVAL HISTORY: pt feels ok     TELEMETRY Personally reviewed:     REVIEW OF SYSTEMS:   CONSTITUTIONAL: No weakness  EYES/ENT: No visual changes; No throat pain  Neck: No pain or stiffness  Respiratory: No cough, wheezing, No shortness of breath  CARDIOVASCULAR: no chest pain or palpitations  GASTROINTESTINAL: No abdominal pain, no nausea, vomiting or hematemesis  GENITOURINARY: No dysuria, frequency or hematuria  NEUROLOGICAL: No stroke like symptoms  SKIN: general facial bruising   	  MEDICATIONS:  amLODIPine   Tablet 5 milliGRAM(s) Oral daily  lisinopril 20 milliGRAM(s) Oral daily        PHYSICAL EXAM:  T(C): 36.6 (01-10-22 @ 08:24), Max: 37.1 (01-09-22 @ 16:43)  HR: 75 (01-10-22 @ 08:24) (71 - 84)  BP: 136/77 (01-10-22 @ 08:24) (107/68 - 162/72)  RR: 18 (01-10-22 @ 08:24) (16 - 18)  SpO2: 93% (01-10-22 @ 08:24) (93% - 98%)  Wt(kg): --  I&O's Summary    09 Jan 2022 07:01  -  10 Jose 2022 07:00  --------------------------------------------------------  IN: 1540 mL / OUT: 0 mL / NET: 1540 mL    10 Jose 2022 07:01  -  10 Jose 2022 12:40  --------------------------------------------------------  IN: 360 mL / OUT: 0 mL / NET: 360 mL          Appearance: In no distress	  HEENT:    PERRL, EOMI	  Cardiovascular:  S1 S2, No JVD  Respiratory: Lungs clear to auscultation	  Gastrointestinal:  Soft, Non-tender, + BS	  Vascularature:  No edema of LE  Psychiatric: Appropriate affect   Neuro: no acute focal deficits                               10.4   4.85  )-----------( 192      ( 10 Jose 2022 06:53 )             32.7     01-10    140  |  106  |  22  ----------------------------<  89  4.1   |  24  |  0.70    Ca    9.2      10 Jose 2022 06:51          Labs personally reviewed      ASSESSMENT/PLAN: 	  1 yr old female w/ PMH of htn, hld, anxiety, and cataracts presents to ED after fall, w likely concussion. Imaging w/ noted multiple facial hematomas and left great toe. Pt able to ambulate in ED but unsteady.     Problem/Plan - 1:  ·  Problem: Traumatic syncope   ·  Plan: Pt does not recall how she fell and woke up suddenly. No prodrome. Highly suspicious for cardiogenic syncope  - discussed ILR with pt who agrees, EP Dr Garcia consulted, plan for ILR Tuesday  - echo with mild AS and normal LVEF    Problem/Plan - 2:  ·  Problem: Facial hematoma, initial encounter.   - CT maxillofacial w/  posttraumatic inflammatory changes of the left face and several small hematomas in the left premaxillary region and a small left periorbital hematoma  - continue to monitor  - tylenol prn pain  - holding pharmacologic DVT ppx.    Problem/Plan - 3:  ·  Problem: Essential hypertension.   ·  Plan: - stable at admission  - home regimen: amlodipine-benezapril 5/20 mg; continue hospital equivalent     Problem/Plan - 4:  ·  Problem: Hyperlipidemia.   - c/w lipitor 20 mg.    Problem/Plan - 5:  ·  Problem: Prophylactic measure.   - DVT ppx: holding chemoppx in setting of acute facial hematomas            Billy DUGAN-BC   Phillip Holder DO [facility]  Cardiovascular Medicine  [address], [address]  Office: [phone]  Cell: [phone]

## 2022-01-10 NOTE — PROGRESS NOTE ADULT - SUBJECTIVE AND OBJECTIVE BOX
Patient is a 81y old  Female who presents with a chief complaint of fall, concussion, unsteady gait (10 Jose 2022 12:40)      SUBJECTIVE / OVERNIGHT EVENTS: currently denies dizziness, cp, sob, abd pain     MEDICATIONS  (STANDING):  amLODIPine   Tablet 5 milliGRAM(s) Oral daily  atorvastatin 20 milliGRAM(s) Oral at bedtime  lisinopril 20 milliGRAM(s) Oral daily  PARoxetine 20 milliGRAM(s) Oral daily  senna 2 Tablet(s) Oral at bedtime  sodium chloride 0.9%. 1000 milliLiter(s) (75 mL/Hr) IV Continuous <Continuous>  traZODone 50 milliGRAM(s) Oral at bedtime    MEDICATIONS  (PRN):  acetaminophen     Tablet .. 650 milliGRAM(s) Oral every 6 hours PRN Temp greater or equal to 38C (100.4F), Mild Pain (1 - 3)  aluminum hydroxide/magnesium hydroxide/simethicone Suspension 30 milliLiter(s) Oral every 4 hours PRN Dyspepsia  melatonin 3 milliGRAM(s) Oral at bedtime PRN Insomnia  ondansetron Injectable 4 milliGRAM(s) IV Push every 8 hours PRN Nausea and/or Vomiting  traMADol 25 milliGRAM(s) Oral every 8 hours PRN Moderate Pain (4 - 6)        CAPILLARY BLOOD GLUCOSE        I&O's Summary    09 Jan 2022 07:01  -  10 Jose 2022 07:00  --------------------------------------------------------  IN: 1540 mL / OUT: 0 mL / NET: 1540 mL    10 Jose 2022 07:01  -  10 Jose 2022 13:22  --------------------------------------------------------  IN: 360 mL / OUT: 0 mL / NET: 360 mL        PHYSICAL EXAM:  GENERAL: NAD, well-developed  EYES: HEAD:  ecchymosis over periorbital region,  NECK: No JVD  CHEST/LUNG: Clear to auscultation bilaterally; No wheeze  HEART: Regular rate and rhythm; S1S2  ABDOMEN: Soft, Nontender, Nondistended; Bowel sounds present  EXTREMITIES:  2+ Peripheral Pulses  PSYCH: AAOx3      LABS:                        10.4   4.85  )-----------( 192      ( 10 Jose 2022 06:53 )             32.7     01-10    140  |  106  |  22  ----------------------------<  89  4.1   |  24  |  0.70    Ca    9.2      10 Jose 2022 06:51        CARDIAC MARKERS ( 09 Jan 2022 07:37 )  x     / x     / 479 U/L / x     / x              RADIOLOGY & ADDITIONAL TESTS:    Imaging Personally Reviewed:    Consultant(s) Notes Reviewed:      Care Discussed with Consultants/Other Providers:

## 2022-01-11 ENCOUNTER — TRANSCRIPTION ENCOUNTER (OUTPATIENT)
Age: 82
End: 2022-01-11

## 2022-01-11 LAB
ANION GAP SERPL CALC-SCNC: 11 MMOL/L — SIGNIFICANT CHANGE UP (ref 5–17)
BUN SERPL-MCNC: 25 MG/DL — HIGH (ref 7–23)
CALCIUM SERPL-MCNC: 9.4 MG/DL — SIGNIFICANT CHANGE UP (ref 8.4–10.5)
CHLORIDE SERPL-SCNC: 106 MMOL/L — SIGNIFICANT CHANGE UP (ref 96–108)
CO2 SERPL-SCNC: 23 MMOL/L — SIGNIFICANT CHANGE UP (ref 22–31)
CREAT SERPL-MCNC: 0.75 MG/DL — SIGNIFICANT CHANGE UP (ref 0.5–1.3)
GLUCOSE SERPL-MCNC: 87 MG/DL — SIGNIFICANT CHANGE UP (ref 70–99)
HCT VFR BLD CALC: 32.9 % — LOW (ref 34.5–45)
HGB BLD-MCNC: 10.5 G/DL — LOW (ref 11.5–15.5)
MAGNESIUM SERPL-MCNC: 1.9 MG/DL — SIGNIFICANT CHANGE UP (ref 1.6–2.6)
MCHC RBC-ENTMCNC: 30 PG — SIGNIFICANT CHANGE UP (ref 27–34)
MCHC RBC-ENTMCNC: 31.9 GM/DL — LOW (ref 32–36)
MCV RBC AUTO: 94 FL — SIGNIFICANT CHANGE UP (ref 80–100)
NRBC # BLD: 0 /100 WBCS — SIGNIFICANT CHANGE UP (ref 0–0)
PHOSPHATE SERPL-MCNC: 5.2 MG/DL — HIGH (ref 2.5–4.5)
PLATELET # BLD AUTO: 202 K/UL — SIGNIFICANT CHANGE UP (ref 150–400)
POTASSIUM SERPL-MCNC: 4.1 MMOL/L — SIGNIFICANT CHANGE UP (ref 3.5–5.3)
POTASSIUM SERPL-SCNC: 4.1 MMOL/L — SIGNIFICANT CHANGE UP (ref 3.5–5.3)
RBC # BLD: 3.5 M/UL — LOW (ref 3.8–5.2)
RBC # FLD: 13.2 % — SIGNIFICANT CHANGE UP (ref 10.3–14.5)
SODIUM SERPL-SCNC: 140 MMOL/L — SIGNIFICANT CHANGE UP (ref 135–145)
WBC # BLD: 5.03 K/UL — SIGNIFICANT CHANGE UP (ref 3.8–10.5)
WBC # FLD AUTO: 5.03 K/UL — SIGNIFICANT CHANGE UP (ref 3.8–10.5)

## 2022-01-11 PROCEDURE — 93010 ELECTROCARDIOGRAM REPORT: CPT

## 2022-01-11 PROCEDURE — 99239 HOSP IP/OBS DSCHRG MGMT >30: CPT

## 2022-01-11 RX ORDER — TRAMADOL HYDROCHLORIDE 50 MG/1
0.5 TABLET ORAL
Qty: 0 | Refills: 0 | DISCHARGE
Start: 2022-01-11

## 2022-01-11 RX ADMIN — AMLODIPINE BESYLATE 5 MILLIGRAM(S): 2.5 TABLET ORAL at 06:09

## 2022-01-11 RX ADMIN — SENNA PLUS 2 TABLET(S): 8.6 TABLET ORAL at 21:08

## 2022-01-11 RX ADMIN — Medication 20 MILLIGRAM(S): at 11:58

## 2022-01-11 RX ADMIN — LISINOPRIL 20 MILLIGRAM(S): 2.5 TABLET ORAL at 06:09

## 2022-01-11 RX ADMIN — Medication 650 MILLIGRAM(S): at 23:08

## 2022-01-11 RX ADMIN — ATORVASTATIN CALCIUM 20 MILLIGRAM(S): 80 TABLET, FILM COATED ORAL at 21:08

## 2022-01-11 RX ADMIN — Medication 3 MILLIGRAM(S): at 21:08

## 2022-01-11 RX ADMIN — Medication 50 MILLIGRAM(S): at 21:08

## 2022-01-11 NOTE — PROGRESS NOTE ADULT - SUBJECTIVE AND OBJECTIVE BOX
EP ATTENDING      HISTORY OF PRESENT ILLNESS: She is a pleasant  y/o female PMH HTN a/w syncope resulting in facial trauma. Her EKG show a first degree AV delay with left axis deviation, but is otherwise unremarkable. Echo pending. She denies palpitations nor angina.    Date of service 1/11- no new complaints. resting comfortably.  awaiting ILR today.    acetaminophen     Tablet .. 650 milliGRAM(s) Oral every 6 hours PRN  aluminum hydroxide/magnesium hydroxide/simethicone Suspension 30 milliLiter(s) Oral every 4 hours PRN  amLODIPine   Tablet 5 milliGRAM(s) Oral daily  atorvastatin 20 milliGRAM(s) Oral at bedtime  lisinopril 20 milliGRAM(s) Oral daily  melatonin 3 milliGRAM(s) Oral at bedtime PRN  ondansetron Injectable 4 milliGRAM(s) IV Push every 8 hours PRN  PARoxetine 20 milliGRAM(s) Oral daily  senna 2 Tablet(s) Oral at bedtime  sodium chloride 0.9%. 1000 milliLiter(s) IV Continuous <Continuous>  traMADol 25 milliGRAM(s) Oral every 8 hours PRN  traZODone 50 milliGRAM(s) Oral at bedtime                            10.5   5.03  )-----------( 202      ( 11 Jan 2022 06:17 )             32.9       01-11    140  |  106  |  25<H>  ----------------------------<  87  4.1   |  23  |  0.75    Ca    9.4      11 Jan 2022 06:17  Phos  5.2     01-11  Mg     1.9     01-11        CARDIAC MARKERS ( 09 Jan 2022 07:37 )  x     / x     / 479 U/L / x     / x            T(C): 36.2 (01-11-22 @ 04:55), Max: 36.6 (01-10-22 @ 21:41)  HR: 73 (01-11-22 @ 04:55) (73 - 84)  BP: 118/73 (01-11-22 @ 04:55) (118/73 - 151/78)  RR: 17 (01-11-22 @ 04:55) (17 - 18)  SpO2: 93% (01-11-22 @ 04:55) (92% - 95%)  Wt(kg): --    I&O's Summary    10 Jose 2022 07:01  -  11 Jan 2022 07:00  --------------------------------------------------------  IN: 780 mL / OUT: 0 mL / NET: 780 mL    11 Jan 2022 07:01  -  11 Jan 2022 10:10  --------------------------------------------------------  IN: 120 mL / OUT: 0 mL / NET: 120 mL        Appearance: Normal, facial bruising.	  HEENT:   Normal oral mucosa, PERRL, EOMI	  Lymphatic: No lymphadenopathy , no edema  Cardiovascular: Normal S1 S2, No JVD, No murmurs , Peripheral pulses palpable 2+ bilaterally  Respiratory: Lungs clear to auscultation, normal effort 	  Gastrointestinal:  Soft, Non-tender, + BS	  Skin: No rashes, No ecchymoses, No cyanosis, warm to touch  Musculoskeletal: Normal range of motion, normal strength  Psychiatry:  Mood & affect appropriate    TELEMETRY: NSR 	    ECG: NSR  Echo: Mild AS. Normal LV EF. Normal valves otherwise.	  	  LABS:	 	                          10.4   4.85  )-----------( 192      ( 10 Jose 2022 06:53 )             32.7     01-10    140  |  106  |  22  ----------------------------<  89  4.1   |  24  |  0.70    Ca    9.2      10 Jose 2022 06:51    A/P) She is a pleasant  y/o female PMH HTN a/w syncope resulting in facial trauma. Her EKG show a first degree AV delay with left axis deviation, but is otherwise unremarkable. Echo pending. She denies palpitations nor angina.    -echo and telemetry are unremarkable.  -agreeable to ILR insertion today for mulu-arrhythmia monitoring after unexplained severe syncopal episode w/ injury.  -followup w/ Dr Holder for  and wound check in 2 weeks.    Joe Montano M.D.  Cardiac Electrophysiology  [phone]

## 2022-01-11 NOTE — DISCHARGE NOTE NURSING/CASE MANAGEMENT/SOCIAL WORK - NSDCVIVACCINE_GEN_ALL_CORE_FT
Tdap; 04-Jun-2017 15:30; Sierra Treawdell (RN); [employer]; [id number]; IntraMuscular; Dorsogluteal Left.; 0.5 milliLiter(s); VIS (VIS Published: 09-May-2013, VIS Presented: 04-Jun-2017);

## 2022-01-11 NOTE — DISCHARGE NOTE NURSING/CASE MANAGEMENT/SOCIAL WORK - NSDCPEFALRISK_GEN_ALL_CORE
For information on Fall & Injury Prevention, visit: [email or web address] OR  [email or web address] OR  https://www.cdc.gov/steadi/patient.html

## 2022-01-11 NOTE — PROGRESS NOTE ADULT - SUBJECTIVE AND OBJECTIVE BOX
DATE OF SERVICE: 01-11-22      Patient is a 81y old  Female who presents with a chief complaint of fall, concussion, unsteady gait (11 Jan 2022 11:29)      INTERVAL HISTORY:  feels ok    TELEMETRY Personally reviewed: no events  	  MEDICATIONS:  amLODIPine   Tablet 5 milliGRAM(s) Oral daily  lisinopril 20 milliGRAM(s) Oral daily        PHYSICAL EXAM:  T(C): 36.7 (01-11-22 @ 11:29), Max: 36.7 (01-11-22 @ 11:29)  HR: 72 (01-11-22 @ 11:29) (72 - 76)  BP: 112/73 (01-11-22 @ 11:29) (112/73 - 137/71)  RR: 18 (01-11-22 @ 11:29) (17 - 18)  SpO2: 98% (01-11-22 @ 11:29) (93% - 98%)  Wt(kg): --  I&O's Summary    10 Jose 2022 07:01  -  11 Jan 2022 07:00  --------------------------------------------------------  IN: 780 mL / OUT: 0 mL / NET: 780 mL    11 Jan 2022 07:01  -  11 Jan 2022 20:08  --------------------------------------------------------  IN: 600 mL / OUT: 0 mL / NET: 600 mL          Appearance: In no distress	  HEENT:    PERRL, EOMI	  Cardiovascular:  S1 S2, No JVD  Respiratory: Lungs clear to auscultation	  Gastrointestinal:  Soft, Non-tender, + BS	  Vascularature:  No edema of LE  Psychiatric: Appropriate affect   Neuro: no acute focal deficits                               10.5   5.03  )-----------( 202      ( 11 Jan 2022 06:17 )             32.9     01-11    140  |  106  |  25<H>  ----------------------------<  87  4.1   |  23  |  0.75    Ca    9.4      11 Jan 2022 06:17  Phos  5.2     01-11  Mg     1.9     01-11          Labs personally reviewed      ASSESSMENT/PLAN: 	  81 yr old female w/ PMH of htn, hld, anxiety, and cataracts presents to ED after fall, w likely concussion. Imaging w/ noted multiple facial hematomas and left great toe. Pt able to ambulate in ED but unsteady.     Problem/Plan - 1:  ·  Problem: Traumatic syncope   ·  Plan: Pt does not recall how she fell and woke up suddenly. No prodrome. Highly suspicious for cardiogenic syncope  - discussed ILR with pt who agrees, EP Dr Garcia consulted, s/p ILR   - echo with mild AS and normal LVEF    Problem/Plan - 2:  ·  Problem: Facial hematoma, initial encounter.   - CT maxillofacial w/  posttraumatic inflammatory changes of the left face and several small hematomas in the left premaxillary region and a small left periorbital hematoma  - continue to monitor  - tylenol prn pain  - holding pharmacologic DVT ppx.    Problem/Plan - 3:  ·  Problem: Essential hypertension.   ·  Plan: - stable at admission  - home regimen: amlodipine-benezapril 5/20 mg; continue hospital equivalent     Problem/Plan - 4:  ·  Problem: Hyperlipidemia.   - c/w lipitor 20 mg.    Problem/Plan - 5:  ·  Problem: Prophylactic measure.   - DVT ppx: holding chemoppx in setting of acute facial hematomas            Phillip Holder DO [facility]  Cardiovascular Medicine  [address], [address]  Office: [phone]  Cell: [phone]

## 2022-01-11 NOTE — CHART NOTE - NSCHARTNOTEFT_GEN_A_CORE
EP Brief Operative Note    Diagnosis: Syncope, Bradycardia  Procedure: ILR insertion  Surgeon: Joe Montano M.D.  Findings: Successful insertion of an [employer] ConfirmRx loop recorder  EBL: minimal  Specimens: none  Post-op Diagnosis: same  Assistants: none    A/P) Ms Lomeli is an 80yo woman with HTN who had unwitnessed syncope with no prodrome, resulting in fall with significant musculoskeletal injuries. No history of palpitations. No AFib or significant bradycardia on telemetry.  Echo grossly within normal limits (normal LV ejection fraction, no severe valve dysfunction). A loop recorder was recommended for bradycardia surveillance after atypical syncopal event.    Followup w/ Dr Holder in 2 weeks for ILR data monitorin and wound check.    Joe Montano M.D.  Cardiac Electrophysiology    office [phone]  pager [phone]

## 2022-01-12 VITALS
TEMPERATURE: 98 F | SYSTOLIC BLOOD PRESSURE: 111 MMHG | RESPIRATION RATE: 18 BRPM | OXYGEN SATURATION: 95 % | DIASTOLIC BLOOD PRESSURE: 63 MMHG | HEART RATE: 77 BPM

## 2022-01-12 PROCEDURE — 97530 THERAPEUTIC ACTIVITIES: CPT

## 2022-01-12 PROCEDURE — 84443 ASSAY THYROID STIM HORMONE: CPT

## 2022-01-12 PROCEDURE — U0003: CPT

## 2022-01-12 PROCEDURE — 33285 INSJ SUBQ CAR RHYTHM MNTR: CPT

## 2022-01-12 PROCEDURE — 81001 URINALYSIS AUTO W/SCOPE: CPT

## 2022-01-12 PROCEDURE — 80048 BASIC METABOLIC PNL TOTAL CA: CPT

## 2022-01-12 PROCEDURE — 80053 COMPREHEN METABOLIC PANEL: CPT

## 2022-01-12 PROCEDURE — 70486 CT MAXILLOFACIAL W/O DYE: CPT | Mod: MA

## 2022-01-12 PROCEDURE — 72125 CT NECK SPINE W/O DYE: CPT | Mod: MA

## 2022-01-12 PROCEDURE — 93306 TTE W/DOPPLER COMPLETE: CPT

## 2022-01-12 PROCEDURE — 73560 X-RAY EXAM OF KNEE 1 OR 2: CPT

## 2022-01-12 PROCEDURE — 76377 3D RENDER W/INTRP POSTPROCES: CPT

## 2022-01-12 PROCEDURE — 36415 COLL VENOUS BLD VENIPUNCTURE: CPT

## 2022-01-12 PROCEDURE — 97116 GAIT TRAINING THERAPY: CPT

## 2022-01-12 PROCEDURE — 85025 COMPLETE CBC W/AUTO DIFF WBC: CPT

## 2022-01-12 PROCEDURE — 82550 ASSAY OF CK (CPK): CPT

## 2022-01-12 PROCEDURE — 93005 ELECTROCARDIOGRAM TRACING: CPT

## 2022-01-12 PROCEDURE — 97161 PT EVAL LOW COMPLEX 20 MIN: CPT

## 2022-01-12 PROCEDURE — C1764: CPT

## 2022-01-12 PROCEDURE — U0005: CPT

## 2022-01-12 PROCEDURE — 84100 ASSAY OF PHOSPHORUS: CPT

## 2022-01-12 PROCEDURE — 71045 X-RAY EXAM CHEST 1 VIEW: CPT

## 2022-01-12 PROCEDURE — 99233 SBSQ HOSP IP/OBS HIGH 50: CPT

## 2022-01-12 PROCEDURE — 70450 CT HEAD/BRAIN W/O DYE: CPT | Mod: MA

## 2022-01-12 PROCEDURE — 99285 EMERGENCY DEPT VISIT HI MDM: CPT

## 2022-01-12 PROCEDURE — 83735 ASSAY OF MAGNESIUM: CPT

## 2022-01-12 PROCEDURE — 73030 X-RAY EXAM OF SHOULDER: CPT

## 2022-01-12 PROCEDURE — 73630 X-RAY EXAM OF FOOT: CPT

## 2022-01-12 PROCEDURE — 85027 COMPLETE CBC AUTOMATED: CPT

## 2022-01-12 RX ORDER — INFLUENZA VIRUS VACCINE 15; 15; 15; 15 UG/.5ML; UG/.5ML; UG/.5ML; UG/.5ML
0.7 SUSPENSION INTRAMUSCULAR ONCE
Refills: 0 | Status: DISCONTINUED | OUTPATIENT
Start: 2022-01-12 | End: 2022-01-12

## 2022-01-12 RX ADMIN — Medication 20 MILLIGRAM(S): at 11:39

## 2022-01-12 RX ADMIN — Medication 650 MILLIGRAM(S): at 00:36

## 2022-01-12 RX ADMIN — AMLODIPINE BESYLATE 5 MILLIGRAM(S): 2.5 TABLET ORAL at 05:37

## 2022-01-12 RX ADMIN — LISINOPRIL 20 MILLIGRAM(S): 2.5 TABLET ORAL at 05:37

## 2022-01-12 NOTE — PROGRESS NOTE ADULT - PROBLEM SELECTOR PLAN 1
Pt does not recall how she fell and woke up suddenly. No prodrome.   Concern for Cardiogenic Syncope   CT head and maxiollofacial w/o acute trauma, noted hematomas as below  Echo with mild AS, normal lv function   Monitor on tele   D/w cards, plan for ILR  PT eval - no needs  pain control- tylenol prn mild pain, tramadol prn moderate pain
Pt does not recall how she fell and woke up suddenly. No prodrome.   Concern for Cardiogenic Syncope   CT head and maxiollofacial w/o acute trauma, noted hematomas as below  Echo with mild AS, normal lv function   Monitor on tele   D/w cards, S/p ILR  PT eval - no needs  pain control- tylenol prn mild pain, tramadol prn moderate pain  D/w daughter, pending SW discussion with daughter, wants to take her home
Pt does not recall how she fell and woke up suddenly. No prodrome.   Concern for Cardiogenic Syncope   CT head and maxiollofacial w/o acute trauma, noted hematomas as below  Check echo   Check EKG  Transfer to tele   D/w cards, plan likely for ILR  PT eval - no needs  pain control- tylenol prn mild pain, tramadol prn moderate pain
Pt does not recall how she fell and woke up suddenly. No prodrome. Concern for Cardiogenic Syncope and may require a Holter or ILR. Cardiology Consulted who recommend ILR. EP to see tomorrow. Pt agreeable.  CT head and maxiollofacial w/o acute trauma, noted hematomas as below  - PT eval - no needs  - pain control- tylenol prn mild pain, tramadol prn moderate pain

## 2022-01-12 NOTE — PROGRESS NOTE ADULT - SUBJECTIVE AND OBJECTIVE BOX
EP ATTENDING      HISTORY OF PRESENT ILLNESS: She is a pleasant  y/o female PMH HTN a/w syncope resulting in facial trauma. Her EKG show a first degree AV delay with left axis deviation, but is otherwise unremarkable. Echo pending. She denies palpitations nor angina.    Date of service 1/12- no new complaints. resting comfortably s/p ILR implant yesterday    acetaminophen     Tablet .. 650 milliGRAM(s) Oral every 6 hours PRN  aluminum hydroxide/magnesium hydroxide/simethicone Suspension 30 milliLiter(s) Oral every 4 hours PRN  amLODIPine   Tablet 5 milliGRAM(s) Oral daily  atorvastatin 20 milliGRAM(s) Oral at bedtime  influenza  Vaccine (HIGH DOSE) 0.7 milliLiter(s) IntraMuscular once  lisinopril 20 milliGRAM(s) Oral daily  melatonin 3 milliGRAM(s) Oral at bedtime PRN  ondansetron Injectable 4 milliGRAM(s) IV Push every 8 hours PRN  PARoxetine 20 milliGRAM(s) Oral daily  senna 2 Tablet(s) Oral at bedtime  sodium chloride 0.9%. 1000 milliLiter(s) IV Continuous <Continuous>  traMADol 25 milliGRAM(s) Oral every 8 hours PRN  traZODone 50 milliGRAM(s) Oral at bedtime                            10.5   5.03  )-----------( 202      ( 11 Jan 2022 06:17 )             32.9       01-11    140  |  106  |  25<H>  ----------------------------<  87  4.1   |  23  |  0.75    Ca    9.4      11 Jan 2022 06:17  Phos  5.2     01-11  Mg     1.9     01-11      T(C): 36.3 (01-12-22 @ 04:00), Max: 36.7 (01-11-22 @ 11:29)  HR: 72 (01-12-22 @ 04:00) (72 - 74)  BP: 100/57 (01-12-22 @ 04:00) (100/57 - 113/71)  RR: 18 (01-12-22 @ 04:00) (17 - 18)  SpO2: 93% (01-12-22 @ 04:00) (93% - 98%)  Wt(kg): --    I&O's Summary    11 Jan 2022 07:01  -  12 Jan 2022 07:00  --------------------------------------------------------  IN: 600 mL / OUT: 0 mL / NET: 600 mL        Appearance: Normal, facial bruising.	  HEENT:   Normal oral mucosa, PERRL, EOMI	  Lymphatic: No lymphadenopathy , no edema  Cardiovascular: Normal S1 S2, No JVD, No murmurs , Peripheral pulses palpable 2+ bilaterally, ILR left chest.  Respiratory: Lungs clear to auscultation, normal effort 	  Gastrointestinal:  Soft, Non-tender, + BS	  Skin: No rashes, No ecchymoses, No cyanosis, warm to touch  Musculoskeletal: Normal range of motion, normal strength  Psychiatry:  Mood & affect appropriate    TELEMETRY: NSR 	    ECG: NSR  Echo: Mild AS. Normal LV EF. Normal valves otherwise.	    A/P) She is a pleasant  y/o female PMH HTN a/w syncope resulting in facial trauma. Her EKG show a first degree AV delay with left axis deviation, but is otherwise unremarkable. Echo pending. She denies palpitations nor angina.    -echo and telemetry are unremarkable.  -s/p ILR implant 1/11.  OK for DC planning.  -followup w/ Dr Holder for  and wound check in 2 weeks.    Joe Montano M.D.  Cardiac Electrophysiology  [phone]

## 2022-01-12 NOTE — PROGRESS NOTE ADULT - SUBJECTIVE AND OBJECTIVE BOX
Date of Service   01-12-22 @ 13:44    Patient is a 81y old  Female who presents with a chief complaint of fall, concussion, unsteady gait (12 Jan 2022 12:51)      INTERVAL HISTORY:     TELEMETRY Personally reviewed:    REVIEW OF SYSTEMS:   CONSTITUTIONAL: No weakness  EYES/ENT: No visual changes; No throat pain  Neck: No pain or stiffness  Respiratory: No cough, wheezing, No shortness of breath  CARDIOVASCULAR: no chest pain or palpitations  GASTROINTESTINAL: No abdominal pain, no nausea, vomiting or hematemesis  GENITOURINARY: No dysuria, frequency or hematuria  NEUROLOGICAL: No stroke like symptoms  SKIN: generalized facial ecchymosis     	  MEDICATIONS:  amLODIPine   Tablet 5 milliGRAM(s) Oral daily  lisinopril 20 milliGRAM(s) Oral daily        PHYSICAL EXAM:  T(C): 36.7 (01-12-22 @ 12:06), Max: 36.7 (01-11-22 @ 21:07)  HR: 77 (01-12-22 @ 12:06) (72 - 77)  BP: 111/63 (01-12-22 @ 12:06) (100/57 - 113/71)  RR: 18 (01-12-22 @ 12:06) (17 - 18)  SpO2: 95% (01-12-22 @ 12:06) (93% - 95%)  Wt(kg): --  I&O's Summary    11 Jan 2022 07:01  -  12 Jan 2022 07:00  --------------------------------------------------------  IN: 600 mL / OUT: 0 mL / NET: 600 mL    12 Jan 2022 07:01  -  12 Jan 2022 13:44  --------------------------------------------------------  IN: 240 mL / OUT: 0 mL / NET: 240 mL          Appearance: In no distress	  HEENT:    PERRL, EOMI	  Cardiovascular:  S1 S2, No JVD  Respiratory: Lungs clear to auscultation	  Gastrointestinal:  Soft, Non-tender, + BS	  Vascularature:  No edema of LE  Psychiatric: Appropriate affect   Neuro: no acute focal deficits                               10.5   5.03  )-----------( 202      ( 11 Jan 2022 06:17 )             32.9     01-11    140  |  106  |  25<H>  ----------------------------<  87  4.1   |  23  |  0.75    Ca    9.4      11 Jan 2022 06:17  Phos  5.2     01-11  Mg     1.9     01-11          Labs personally reviewed      ASSESSMENT/PLAN: 	    81 yr old female w/ PMH of htn, hld, anxiety, and cataracts presents to ED after fall, w likely concussion. Imaging w/ noted multiple facial hematomas and left great toe. Pt able to ambulate in ED but unsteady.     Problem/Plan - 1:  ·  Problem: Traumatic syncope   ·  Plan: Pt does not recall how she fell and woke up suddenly. No prodrome. Highly suspicious for cardiogenic syncope  - discussed ILR with pt who agrees, EP Dr Garcia consulted, s/p ILR   - echo with mild AS and normal LVEF    Problem/Plan - 2:  ·  Problem: Facial hematoma, initial encounter.   - CT maxillofacial w/  posttraumatic inflammatory changes of the left face and several small hematomas in the left premaxillary region and a small left periorbital hematoma  - continue to monitor  - tylenol prn pain  - holding pharmacologic DVT ppx.    Problem/Plan - 3:  ·  Problem: Essential hypertension.   - BP stable   - home regimen: amlodipine-benezapril 5/20 mg; continue hospital equivalent     Problem/Plan - 4:  ·  Problem: Hyperlipidemia.   - c/w lipitor 20 mg.    Problem/Plan - 5:  ·  Problem: Prophylactic measure.   - DVT ppx: holding chemoppx in setting of acute facial hematomas        [facility]   Phillip Holder DO [facility]  Cardiovascular Medicine  [address], [address]  Office: [phone]  Cell: [phone]

## 2022-01-12 NOTE — PROGRESS NOTE ADULT - REASON FOR ADMISSION
fall, concussion, unsteady gait

## 2022-01-12 NOTE — PROGRESS NOTE ADULT - PROBLEM SELECTOR PLAN 5
incidental finding on CT;   thyroid gland is markedly enlarged with numerous nodules and calcifications, compatible with a thyroid goiter. The inferior extent of the goiter is not within the field-of-view

## 2022-01-12 NOTE — PROGRESS NOTE ADULT - PROBLEM SELECTOR PLAN 2
XR foot w/ Acute minimally displaced fracture of the head of the left first proximal phalanx. Pt still acutely tender, pain w/ ambulation  WBAT  podiatry to follow up outpt in 1 week.  surgical shoe ordered
XR foot w/ Acute minimally displaced fracture of the head of the left first proximal phalanx. Pt still acutely tender, pain w/ ambulation  WBAT  podiatry to follow up outpt in 1 week.  surgical shoe ordered
XR foot w/ Acute minimally displaced fracture of the head of the left first proximal phalanx. Pt still acutely tender, pain w/ ambulation. F/u remaining xrays.  - WBAT  - podiatry to follow up outpt in 1 week.  - surgical shoe ordered
XR foot w/ Acute minimally displaced fracture of the head of the left first proximal phalanx. Pt still acutely tender, pain w/ ambulation  WBAT  podiatry to follow up outpt in 1 week.  surgical shoe ordered

## 2022-01-12 NOTE — PROGRESS NOTE ADULT - PROVIDER SPECIALTY LIST ADULT
Cardiology
Electrophysiology
Electrophysiology
Hospitalist

## 2022-01-12 NOTE — PROGRESS NOTE ADULT - PROBLEM SELECTOR PLAN 3
CT maxillofacial w/  posttraumatic inflammatory changes of the left face and several small hematomas in the left premaxillary region and a small left periorbital hematoma  - continue to monitor  - tylenol prn pain  - holding pharmacologic dvt ppx
CT maxillofacial w/  posttraumatic inflammatory changes of the left face and several small hematomas in the left premaxillary region and a small left periorbital hematoma  continue to monitor  tylenol prn pain  holding pharmacologic dvt ppx

## 2022-01-12 NOTE — PROGRESS NOTE ADULT - SUBJECTIVE AND OBJECTIVE BOX
Patient is a 81y old  Female who presents with a chief complaint of fall, concussion, unsteady gait (12 Jan 2022 09:42)      SUBJECTIVE / OVERNIGHT EVENTS: feels ok, wants to go home, wants to speak to podiatry regarding the boot, denies cp, sob     MEDICATIONS  (STANDING):  amLODIPine   Tablet 5 milliGRAM(s) Oral daily  atorvastatin 20 milliGRAM(s) Oral at bedtime  influenza  Vaccine (HIGH DOSE) 0.7 milliLiter(s) IntraMuscular once  lisinopril 20 milliGRAM(s) Oral daily  PARoxetine 20 milliGRAM(s) Oral daily  senna 2 Tablet(s) Oral at bedtime  sodium chloride 0.9%. 1000 milliLiter(s) (75 mL/Hr) IV Continuous <Continuous>  traZODone 50 milliGRAM(s) Oral at bedtime    MEDICATIONS  (PRN):  acetaminophen     Tablet .. 650 milliGRAM(s) Oral every 6 hours PRN Temp greater or equal to 38C (100.4F), Mild Pain (1 - 3)  aluminum hydroxide/magnesium hydroxide/simethicone Suspension 30 milliLiter(s) Oral every 4 hours PRN Dyspepsia  melatonin 3 milliGRAM(s) Oral at bedtime PRN Insomnia  ondansetron Injectable 4 milliGRAM(s) IV Push every 8 hours PRN Nausea and/or Vomiting  traMADol 25 milliGRAM(s) Oral every 8 hours PRN Moderate Pain (4 - 6)        CAPILLARY BLOOD GLUCOSE        I&O's Summary    11 Jan 2022 07:01  -  12 Jan 2022 07:00  --------------------------------------------------------  IN: 600 mL / OUT: 0 mL / NET: 600 mL    12 Jan 2022 07:01  -  12 Jan 2022 12:52  --------------------------------------------------------  IN: 120 mL / OUT: 0 mL / NET: 120 mL        PHYSICAL EXAM:  GENERAL: NAD, well-developed  HEAD:  Atraumatic, Normocephalic  EYES: conjunctiva and sclera clear  NECK: No JVD  CHEST/LUNG: Clear to auscultation bilaterally; No wheeze  HEART: Regular rate and rhythm; S1S2  ABDOMEN: Soft, Nontender, Nondistended; Bowel sounds present  EXTREMITIES:  2+ Peripheral Pulses, trace edema le bl  PSYCH: AAOx3    LABS:                        10.5   5.03  )-----------( 202      ( 11 Jan 2022 06:17 )             32.9     01-11    140  |  106  |  25<H>  ----------------------------<  87  4.1   |  23  |  0.75    Ca    9.4      11 Jan 2022 06:17  Phos  5.2     01-11  Mg     1.9     01-11                RADIOLOGY & ADDITIONAL TESTS:    Imaging Personally Reviewed:    Consultant(s) Notes Reviewed:      Care Discussed with Consultants/Other Providers:

## 2022-01-12 NOTE — CHART NOTE - NSCHARTNOTEFT_GEN_A_CORE
Please provide one darco post op shoe for patient's left foot  Weight bear as tolerated in surgical shoe to left foot

## 2022-01-12 NOTE — PATIENT PROFILE ADULT - FALL HARM RISK - HARM RISK INTERVENTIONS

## 2022-01-12 NOTE — PROGRESS NOTE ADULT - ASSESSMENT
1 yr old female w/ PMH of htn, hld, anxiety, and cataracts presents to ED after fall, w likely concussion. Imaging w/ noted multiple facial hematomas and left great toe. S/p ILR
1 yr old female w/ PMH of htn, hld, anxiety, and cataracts presents to ED after fall, w likely concussion. Imaging w/ noted multiple facial hematomas and left great toe. Pending ILR
1 yr old female w/ PMH of htn, hld, anxiety, and cataracts presents to ED after fall, w likely concussion. Imaging w/ noted multiple facial hematomas and left great toe. 
1 yr old female w/ PMH of htn, hld, anxiety, and cataracts presents to ED after fall, w likely concussion. Imaging w/ noted multiple facial hematomas and left great toe. Pt able to ambulate in ED but unsteady.

## 2022-01-12 NOTE — PROGRESS NOTE ADULT - PROBLEM SELECTOR PLAN 7
- c/w paroxetine 20 mg and trazodone 50 mg qHs
c/w paroxetine 20 mg and trazodone 50 mg qHs

## 2022-01-12 NOTE — PROGRESS NOTE ADULT - PROBLEM SELECTOR PROBLEM 3
Facial hematoma, initial encounter

## 2022-01-12 NOTE — PROGRESS NOTE ADULT - PROBLEM SELECTOR PROBLEM 2
Fracture of phalanx of toe

## 2022-01-12 NOTE — PROGRESS NOTE ADULT - PROBLEM SELECTOR PLAN 4
stable at admission  home regimen: amlodipine-benezapril 5/20 mg; continue w/ amlodipine; benaprezil not available here, will subsitute w/lisinopril 20 mg
stable at admission  home regimen: amlodipine-benezapril 5/20 mg; continue w/ amlodipine; benaprezil not available here, will subsitute w/lisinopril 20 mg
- stable at admission  - home regimen: amlodipine-benezapril 5/20 mg; continue w/ amlodipine; benaprezil not available here, will subsitute w/lisinopril 20 mg
stable at admission  home regimen: amlodipine-benezapril 5/20 mg; continue w/ amlodipine; benaprezil not available here, will subsitute w/lisinopril 20 mg

## 2022-01-12 NOTE — PROGRESS NOTE ADULT - PROBLEM SELECTOR PLAN 8
DVT ppx: holding chemoppx in setting of acute facial hematomas    Disposition: Dc home post ILR  Discussed in detail with the patient's daughter Taty today on 1/9 who will care for her father so the patient can get the care she needs during this hospitalization.
DVT ppx: holding chemoppx in setting of acute facial hematomas    Disposition: Dc home post ILR

## 2022-03-04 ENCOUNTER — APPOINTMENT (OUTPATIENT)
Dept: ORTHOPEDIC SURGERY | Facility: CLINIC | Age: 82
End: 2022-03-04
Payer: MEDICARE

## 2022-03-04 ENCOUNTER — NON-APPOINTMENT (OUTPATIENT)
Age: 82
End: 2022-03-04

## 2022-03-04 VITALS — HEIGHT: 62 IN | WEIGHT: 190 LBS | BODY MASS INDEX: 34.96 KG/M2

## 2022-03-04 PROCEDURE — 99204 OFFICE O/P NEW MOD 45 MIN: CPT | Mod: 25

## 2022-03-04 PROCEDURE — 20610 DRAIN/INJ JOINT/BURSA W/O US: CPT | Mod: LT

## 2022-03-04 NOTE — PROCEDURE
[de-identified] : Injection: Left knee joint.\par Indication: Osteoarthritis.\par \par A discussion was had with the patient regarding this procedure and all questions were answered. All risks, benefits and alternatives were discussed. These included but were not limited to bleeding, infection, allergic reaction and reaccumulation of fluid. A timeout was done to ensure correct side and patient agreed to the procedure.  A [place] jerel was created on the skin utilizing a plastic needle cap to jerel the anticipated point of entry.   Alcohol was used to clean the skin, and Betadine was used to sterilize and prep the area in the lateral joint line aspect of the knee. Ethyl chloride spray was then used as a topical anesthetic. A 22-gauge needle was used to inject 2cc of 0.25% bupivacaine without epinephrine and 1cc of 40mg/ml methylprednisolone into the knee. A sterile bandage was then applied. The patient tolerated the procedure well.\par \par \par \par

## 2022-03-04 NOTE — PHYSICAL EXAM
[de-identified] : Constitutional: Well-nourished, well-developed, No acute distress\par Respiratory:  Good respiratory effort, no SOB\par Lymphatic: No regional lymphadenopathy, no lymphedema\par Psychiatric: Pleasant and normal affect, alert and oriented x3\par Musculoskeletal: normal except where as noted in regional exam\par \par Left knee:\par APPEARANCE: + enlargement of the distal femur and proximal tibia, varus deformity, no swelling\par POSITIVE TENDERNESS:  Distal femur, proximal tibia, medial jt line/retinaculum, and lateral jt line/retinaculum\par NONTENDER: patellar & quadriceps tendons, MCL/LCL, ITB at the lateral femoral condyle & Gerdy's tubercle, pes bursa. \par ROM: full extension, limited flexion to 115° due to stiffness and pain. \par RESISTIVE TESTING: painless resisted knee flex/ext, although + crepitus felt in anterior knee. \par SPECIAL TESTS: stable v/v stress. painless grind. neg ant/post drawer. + Fernando's for pain in medial and lateral joint line. \par  [de-identified] : I reviewed, interpreted and clinically correlated the following outside imaging studies,\par Outside x-rays, 3 views of the left knee, evidence of severe tricompartmental osteoarthritis with near bone-on-bone contact of the medial compartment, and bone-on-bone contact of the patellofemoral compartment\par  _____________\par ACC: [id number] EXAM: XR FOOT COMP MIN 3 VIEWS LT\par ACC: [id number] EXAM: XR KNEE 1-2 VIEWS LT\par \par PROCEDURE DATE: 01/08/2022\par \par \par \par INTERPRETATION:  Radiographs of the left knee CPT 80754\par \par CLINICAL INFORMATION: Status post fall. Left knee pain of unknown severity or duration.\par \par TECHNIQUE: Frontal, lateral and oblique views of the knee were obtained.\par \par FINDINGS: No prior exams are available for comparison.\par \par No gross fracture or dislocation is seen. Significant degenerative changes are noted involving the patellofemoral compartment and medial compartments of the left knee. Moderate degenerative changes are seen in the lateral compartment. A small suprapatellar joint effusion is noted. Vascular calcifications are noted.\par \par IMPRESSION: Tricompartmental degenerative changes noted in the left knee as noted above. Small suprapatellar joint effusion is noted

## 2022-03-04 NOTE — DISCUSSION/SUMMARY
[de-identified] : Discussed findings of today's exam and possible causes of patient's pain.  Educated patient on their most probable diagnosis of chronic left knee pain with recent exacerbation of underlying severe osteoarthritis.  Reviewed possible courses of treatment, and we collaboratively decided best course of treatment at this time will include conservative management.  We discussed various treatment options as well as associated risk/benefits/alternatives and patient elected to proceed with cortisone injection today (see procedure note).  Informed the patient that the numbing medicine in today's injection will last for about 4-6 hours. The steroid that was injected will start to work in 1 to 2 days, peak at 1-2 weeks, and may last up to 1-2 months.  Patient may continue take Tylenol as needed for pain.  Patient has had hyaluronic acid injections in the past, she is advised that we can consider repeat HA injections as long as there is 1 month between today's cortisone injection and following up for HA injections in left knee at that time.  Patient appreciates and agrees with current plan.\par \par I work as part of an academic orthopedic group and routinely have a physician in training (resident / fellow) working with me.  Any part of the history and physical exam performed by the physician in training was either directly reviewed and/or replicated by myself.  Any procedure performed by the physician in training was performed under my direct supervision and with the consent of the patient.\par \par This note was generated using dragon medical dictation software.  A reasonable effort has been made for proofreading its contents, but typos may still remain.  If there are any questions or points of clarification needed please notify my office.

## 2022-03-04 NOTE — HISTORY OF PRESENT ILLNESS
[de-identified] : Patient is here for left knee pain. This has been a chronic issue which worsened about 2 months ago after a fall. She was told in the past that she needs a knee replacement. Xrays were taken after the fall which were negative for knee fracture. She has done nothing to treat this pain. She is under the care of a podiatrist for foot fractures. \par \par The patient's past medical history, past surgical history, medications and allergies were reviewed by me today and documented accordingly. In addition, the patient's family and social history, which were noncontributory to this visit, were reviewed also. Intake form was reviewed. The patient has no family history of arthritis.

## 2022-04-07 ENCOUNTER — APPOINTMENT (OUTPATIENT)
Dept: ORTHOPEDIC SURGERY | Facility: CLINIC | Age: 82
End: 2022-04-07
Payer: MEDICARE

## 2022-04-07 VITALS
BODY MASS INDEX: 34.96 KG/M2 | SYSTOLIC BLOOD PRESSURE: 107 MMHG | HEART RATE: 128 BPM | DIASTOLIC BLOOD PRESSURE: 70 MMHG | WEIGHT: 190 LBS | HEIGHT: 62 IN

## 2022-04-07 PROCEDURE — 20611 DRAIN/INJ JOINT/BURSA W/US: CPT | Mod: LT

## 2022-04-07 PROCEDURE — 99214 OFFICE O/P EST MOD 30 MIN: CPT | Mod: 25

## 2022-04-07 PROCEDURE — 20606 DRAIN/INJ JOINT/BURSA W/US: CPT | Mod: RT,59

## 2022-04-08 NOTE — HISTORY OF PRESENT ILLNESS
[de-identified] : Patient has a history of knee osteoarthritis.  We discussed treatment options and have obtained insurance authorization for a hyaluronic acid injection and patient would like to proceed at this time.  No significant interval change in knee pain since last evaluated. \par Patient is also here today requesting evaluation of chronic bilateral ankle pain.  She states this has been a problem for her for numerous years, at least 10+ years.  She has been seen by other providers for this in the past, she has been seen by podiatry in the past, she has received injections in the past.  She has not received injections in the last several years.  She states she recalls injections were helping when she had pain.  Patient has not done any treatment for this issue recently, she has not had any evaluation for this issue recently.  No numbness/tingling, no radiation of pain.  Patient has achy pain throughout the bilateral ankles, and it is exacerbated with prolonged walking and going up and down stairs.

## 2022-04-08 NOTE — DISCUSSION/SUMMARY
[de-identified] : Discussed findings of today's exam and possible causes of patient's pain.  Educated patient on their most probable diagnosis of chronic bilateral ankle pain with recent atraumatic exacerbation, right more symptomatic than left at this time due to exacerbation of underlying osteoarthritis.  Reviewed possible courses of treatment, and we collaboratively decided best course of treatment at this time will include conservative management.  We discussed various treatment options as well as associated risk/benefits/alternatives and patient elected to proceed with ultrasound-guided cortisone injection today (see procedure note).  Informed the patient that the numbing medicine in today's injection will last for about 4-6 hours. The steroid that was injected will start to work in 1 to 2 days, peak at 1-2 weeks, and may last up to 1-2 months.\par \par Patient was also seen today for continued management of chronic left knee pain with recent atraumatic exacerbation secondary to underlying osteoarthritis.  We discussed various treatment options as well as associated risk/benefits/alternatives and patient elected to proceed with hyaluronic acid injection therapy. A single Monovisc injection was given today under sterile conditions into the Left knee joint without complication (see procedure note). The patient was instructed on modification of activities over the next 48-72 hours. I advised the patient to ice the knee as needed for control of local irritation from the injection. I advised that some patients have immediate benefit from the initial injection therapy, however it usually takes the medication a number of weeks (~8wks) to provide significant relief of osteoarthritic symptoms. If no significant long-term benefit, the patient may elect for additional treatment strategies as previously discussed. However, if the patient obtains good relief of symptoms, the injection therapy series can be repeated over the next 6-12 months.\par \par Will have the patient followup on an as-needed basis at this time.  Patient appreciates and agrees with current plan.\par \par This note was generated using dragon medical dictation software.  A reasonable effort has been made for proofreading its contents, but typos may still remain.  If there are any questions or points of clarification needed please notify my office.\par

## 2022-04-08 NOTE — PHYSICAL EXAM
[de-identified] : Constitutional: Well-nourished, well-developed, No acute distress\par Respiratory: Good respiratory effort, no SOB\par Lymphatic: No regional lymphadenopathy, no lymphedema\par Psychiatric: Pleasant and normal affect, alert and oriented x3\par Musculoskeletal: normal except where as noted in regional exam\par \par Left knee:\par APPEARANCE: + enlargement of the distal femur and proximal tibia, varus deformity, no swelling\par POSITIVE TENDERNESS: Distal femur, proximal tibia, medial jt line/retinaculum, and lateral jt line/retinaculum\par NONTENDER: patellar & quadriceps tendons, MCL/LCL, ITB at the lateral femoral condyle & Gerdy's tubercle, pes bursa. \par ROM: full extension, limited flexion to 115° due to stiffness and pain. \par RESISTIVE TESTING: painless resisted knee flex/ext, although + crepitus felt in anterior knee. \par SPECIAL TESTS: stable v/v stress. painless grind. neg ant/post drawer. + Fernando's for pain in medial and lateral joint line. \par \par Bilateral ankles:\par APPEARANCE: Very mild lateral ankle swelling, + pes planus, hyperpronation, and mild navicular drop\par POSITIVE TENDERNESS: Anterior ankle mortise, lateral ankle joint line, sinus tarsus\par NONTENDER: medial malleolus, lateral malleolus, tibialis posterior tendon, achilles tendon, no marked thickening of tendon, ATFL, CFL, PTFL, deltoid ligaments, 5th metatarsal. \par RANGE OF MOTION: Mildly limited in all directions due to stiffness and pain\par RESISTIVE TESTING: Mildly painful 4/5 resisted dorsiflexion, plantar flexion, inversion & eversion. \par SPECIAL TESTS: neg anterior drawer. neg talar tilt. neg Kleiger's\par

## 2022-04-08 NOTE — PROCEDURE
[de-identified] : Ultrasound Guided Injection \par Indication for ultrasound guidance: Avoidance of neurovascular structures\par \par Utlizing the [employer] II Edge portable ultrasound machine, the Linear 6cm 13-6 MHz transducer, sterile probe cover and sterile ultrasound gel, ultrasound guidance with the probe in the longitudinal axis, utilizing an out of plane approach, was used for the following injection:\par \par Injection: Left knee joint.\par Indication: Osteoarthritis.\par \par A discussion was had with the patient regarding this procedure and all questions were answered. All risks, benefits and alternatives were discussed. These included but were not limited to bleeding, infection, and allergic reaction. A timeout was done to ensure correct side and patient agreed to the procedure.  A [place] jerel was created on the skin utilizing a plastic needle cap to jerel the anticipated point of entry. Alcohol was used to clean the skin, and betadine was used to sterilize and prep the area in the lateral joint line aspect of the knee. Ethyl chloride spray was then used as a topical anesthetic. A 20-gauge needle was used to inject 4 cc of Monovisc into the knee with ease. A sterile bandage was then applied. The patient tolerated the procedure well and there were no complications. \par \par Lot #:  [id number]\par Exp:  2/28/23\par  \par ___________\par \par Procedure:  Ultrasound Guided cortisone injection of the right ankle\par Indication for ultrasound guidance: Ensured placement within the joint, and avoidance of superficial vasculature and musculotendinous structures\par Indication for injection: Osteoarthritis\par \par Utlizing the Sonosite II Edge portable ultrasound machine, the Linear 6cm 13-6 MHz transducer, sterile probe cover and sterile ultrasound gel, ultrasound guidance with the probe in the longitudinal axis, utilizing an out of plane approach was used for the injection.  A discussion was had with the patient regarding this procedure and all questions were answered. All risks, benefits and alternatives were discussed. These included but were not limited to bleeding, infection, allergic reaction and reaccumulation of fluid. A timeout was done to ensure correct side and pt agreed to the procedure.  Alcohol was used to clean the skin, and betadine was used to sterilize and prep the area in the anterolateral ankle. Ethyl chloride spray was then used as a topical anesthetic. A 25-gauge needle was used to inject 2cc of 1% lidocaine without epinephrine and 1cc of 40mg/ml methylprednisolone into the ankle. A sterile bandage was then applied. The patient tolerated the procedure well.

## 2022-04-28 ENCOUNTER — APPOINTMENT (OUTPATIENT)
Dept: ORTHOPEDIC SURGERY | Facility: CLINIC | Age: 82
End: 2022-04-28
Payer: MEDICARE

## 2022-04-28 VITALS
SYSTOLIC BLOOD PRESSURE: 118 MMHG | HEIGHT: 62 IN | HEART RATE: 125 BPM | BODY MASS INDEX: 34.96 KG/M2 | WEIGHT: 190 LBS | DIASTOLIC BLOOD PRESSURE: 76 MMHG

## 2022-04-28 PROCEDURE — 20606 DRAIN/INJ JOINT/BURSA W/US: CPT | Mod: LT

## 2022-04-28 PROCEDURE — 99214 OFFICE O/P EST MOD 30 MIN: CPT | Mod: 25

## 2022-04-28 NOTE — PHYSICAL EXAM
[de-identified] : Constitutional: Well-nourished, well-developed, No acute distress\par Respiratory: Good respiratory effort, no SOB\par Lymphatic: No regional lymphadenopathy, no lymphedema\par Psychiatric: Pleasant and normal affect, alert and oriented x3\par Musculoskeletal: normal except where as noted in regional exam\par \par Left knee:\par APPEARANCE: + enlargement of the distal femur and proximal tibia, varus deformity, no swelling\par POSITIVE TENDERNESS: Distal femur, proximal tibia, medial jt line/retinaculum, and lateral jt line/retinaculum\par NONTENDER: patellar & quadriceps tendons, MCL/LCL, ITB at the lateral femoral condyle & Gerdy's tubercle, pes bursa. \par ROM: full extension, limited flexion to 115° due to stiffness and pain. \par RESISTIVE TESTING: painless resisted knee flex/ext, although + crepitus felt in anterior knee. \par SPECIAL TESTS: stable v/v stress. painless grind. neg ant/post drawer. + Fernando's for pain in medial and lateral joint line. \par \par Bilateral ankles:\par APPEARANCE: Very mild lateral ankle swelling, + pes planus, hyperpronation, and mild navicular drop\par POSITIVE TENDERNESS: Anterior ankle mortise, lateral ankle joint line, sinus tarsus\par NONTENDER: medial malleolus, lateral malleolus, tibialis posterior tendon, achilles tendon, no marked thickening of tendon, ATFL, CFL, PTFL, deltoid ligaments, 5th metatarsal. \par RANGE OF MOTION: Mildly limited in all directions due to stiffness and pain\par RESISTIVE TESTING: Mildly painful 4/5 resisted dorsiflexion, plantar flexion, inversion & eversion. \par

## 2022-04-28 NOTE — DISCUSSION/SUMMARY
[de-identified] : Patient was seen today for reevaluation of chronic left ankle pain with recent atraumatic exacerbation due to underlying osteoarthritis.  We discussed various treatment options as well as associated risk/benefits/alternatives and patient elected to proceed with ultrasound-guided cortisone injection today (see procedure note).  Informed the patient that the numbing medicine in today's injection will last for about 4-6 hours. The steroid that was injected will start to work in 1 to 2 days, peak at 1-2 weeks, and may last up to 1-2 months.\par Patient was requesting repeat right ankle injection today, she was advised that is only been 3 weeks since her last cortisone injection, I recommend at least 1 month in between cortisone injections, and patient was advised that there is a limit of 3 cortisone injections per joint per 12-month period.  She states that the right ankle is approximately 50% better, recommend continued watchful waiting and other conservative measures that were previously discussed.\par Patient is also requesting repeat injection into her left knee today.  I advised the patient she received a gel injection 3 weeks ago, it is too soon to proceed with any repeat injections, we must have at least 1 month to see if the injection can provide some noted relief before considering any repeat injections.  Patient requested a repeat gel injection in her left knee, she is advised we cannot do this for at least 6 months after the last injection as per insurance requirements.  We can consider repeat cortisone injection after 1 month from the last injection if she still having persisting pain, again with the limitations of 3 injections per joint per 12-month period.  \par Follow up as needed.  Patient appreciates and agrees with current plan.\par \par I work as part of an academic orthopedic group and routinely have a physician in training (resident / fellow) working with me.  Any part of the history and physical exam performed by the physician in training was either directly reviewed and/or replicated by myself.  Any procedure performed by the physician in training was performed under my direct supervision and with the consent of the patient.\par \par This note was generated using dragon medical dictation software.  A reasonable effort has been made for proofreading its contents, but typos may still remain.  If there are any questions or points of clarification needed please notify my office.\par \par

## 2022-04-28 NOTE — PROCEDURE
[de-identified] : Procedure:  Ultrasound Guided cortisone injection of the LEFT ankle\par Indication for ultrasound guidance: Ensured placement within the joint, and avoidance of superficial vasculature and musculotendinous structures\par Indication for injection: Osteoarthritis\par \par Utlizing the [email or web address] II [employer] portable ultrasound machine, the Linear 6cm 13-6 MHz transducer, sterile probe cover and sterile ultrasound gel, ultrasound guidance with the probe in the longitudinal axis, utilizing an out of plane approach was used for the injection.  A discussion was had with the patient regarding this procedure and all questions were answered. All risks, benefits and alternatives were discussed. These included but were not limited to bleeding, infection, allergic reaction and reaccumulation of fluid. A timeout was done to ensure correct side and pt agreed to the procedure.  Alcohol was used to clean the skin, and betadine was used to sterilize and prep the area in the anterolateral ankle. Ethyl chloride spray was then used as a topical anesthetic. A 25-gauge needle was used to inject 2cc of 1% lidocaine without epinephrine and 1cc of 40mg/ml methylprednisolone into the ankle. A sterile bandage was then applied. The patient tolerated the procedure well.

## 2022-04-28 NOTE — HISTORY OF PRESENT ILLNESS
[de-identified] : Patient is here for left foot pain. Patient states that she made the appointment to get an injection into her left ankle but she states that the right one hurts more and is requesting the injection on that side instead. She states that the injection performed 2 weeks ago provided about 50% relief.  There has been no recent injury.

## 2022-05-09 ENCOUNTER — APPOINTMENT (OUTPATIENT)
Dept: UROGYNECOLOGY | Facility: CLINIC | Age: 82
End: 2022-05-09
Payer: MEDICARE

## 2022-05-09 DIAGNOSIS — E66.9 OBESITY, UNSPECIFIED: ICD-10-CM

## 2022-05-09 DIAGNOSIS — Z78.9 OTHER SPECIFIED HEALTH STATUS: ICD-10-CM

## 2022-05-09 DIAGNOSIS — Z82.49 FAMILY HISTORY OF ISCHEMIC HEART DISEASE AND OTHER DISEASES OF THE CIRCULATORY SYSTEM: ICD-10-CM

## 2022-05-09 DIAGNOSIS — E07.9 DISORDER OF THYROID, UNSPECIFIED: ICD-10-CM

## 2022-05-09 DIAGNOSIS — N81.6 RECTOCELE: ICD-10-CM

## 2022-05-09 DIAGNOSIS — Z83.42 FAMILY HISTORY OF FAMILIAL HYPERCHOLESTEROLEMIA: ICD-10-CM

## 2022-05-09 DIAGNOSIS — I10 ESSENTIAL (PRIMARY) HYPERTENSION: ICD-10-CM

## 2022-05-09 DIAGNOSIS — E78.00 PURE HYPERCHOLESTEROLEMIA, UNSPECIFIED: ICD-10-CM

## 2022-05-09 LAB
BILIRUB UR QL STRIP: NORMAL
CLARITY UR: CLEAR
COLLECTION METHOD: NORMAL
GLUCOSE UR-MCNC: NORMAL
HCG UR QL: 0.2 EU/DL
HGB UR QL STRIP.AUTO: NORMAL
KETONES UR-MCNC: NORMAL
LEUKOCYTE ESTERASE UR QL STRIP: NORMAL
NITRITE UR QL STRIP: NORMAL
PH UR STRIP: 5.5
PROT UR STRIP-MCNC: 30
SP GR UR STRIP: 1.02

## 2022-05-09 PROCEDURE — 99203 OFFICE O/P NEW LOW 30 MIN: CPT | Mod: 25

## 2022-05-09 PROCEDURE — 51701 INSERT BLADDER CATHETER: CPT

## 2022-05-09 NOTE — HISTORY OF PRESENT ILLNESS
[Cystocele (Obstetric)] : no [Uterine Prolapse] : no [Vaginal Wall Prolapse] : no [Rectal Prolapse] : no [Unable To Restrain Bowel Movement] : mild [Urinary Frequency] : no [Feelings Of Urinary Urgency] : mild [x2] : nocturia two times a night [] : yes [Uses ___ pads per day] : uses [unfilled] pad(s) per day [de-identified] : for the last 10 years  [de-identified] : daily for the last 10 years  [de-identified] : every 2 hours  [de-identified] : keeps comode near bed  [FreeTextEntry1] : Sirisha is an 83 yo who presents today for mixed urinary incontinence, urge predominant. \par \par PMHx: HTN, HLD, obesity, thyroid disease \par PSHx: knee replacement, broken toes \par SHx: , nonsmoker, retired

## 2022-05-09 NOTE — DISCUSSION/SUMMARY
[FreeTextEntry1] : Sirisha is an 81 yo who presents for mixed incontinence, urge predominant. In regards to her OAB, discussed behavioral modifications with patient, avoiding bladder irritants. Discussed behavioral modifications, physical therapy, medications, PTNS, sacral neuromodulation, intravesical Botox.  Would like to start with behavioral modification and think about her options.  IUGA forms on overactive bladder, Kegel exercises, bladder diary given to patient.  I have asked her to complete a bladder diary prior to her next visit. \par With regards to the rectocele she is asymptomatic and we will manage it conservatively.\par  All questions were answered.\par \par RTO in 1 month or sooner if issues arise. \par

## 2022-05-09 NOTE — PHYSICAL EXAM
[Chaperone Present] : A chaperone was present in the examining room during all aspects of the physical examination [No Acute Distress] : in no acute distress [Well developed] : well developed [Well Nourished] : ~L well nourished [Oriented x3] : oriented to person, place, and time [Labia Majora] : were normal [Labia Minora] : were normal [Normal Appearance] : general appearance was normal [Atrophy] : atrophy [Warm and Dry] : was warm and dry to touch [Normal] : normal [Uterine Adnexae] : were not tender and not enlarged [1] : 1 [Aa ____] : Aa [unfilled] [Ba ____] : Ba [unfilled] [C ____] : C [unfilled] [GH ____] : GH [unfilled] [PB ____] : PB [unfilled] [TVL ____] : TVL  [unfilled] [Ap ____] : Ap [unfilled] [Bp ____] : Bp [unfilled] [D ____] : D [unfilled] [Tenderness] : ~T no ~M abdominal tenderness observed [Distended] : not distended [Normal Gait] : gait was abnormal [Rectocele] : a rectocele [Exam Deferred] : was deferred

## 2022-05-09 NOTE — PROCEDURE
[FreeTextEntry1] : Sterile straight catheterization was performed to measure a postvoid residual volume and rule out urinary tract infection which was 5 cc\par

## 2022-05-10 PROBLEM — Z78.9 NON-SMOKER: Status: ACTIVE | Noted: 2022-05-10

## 2022-05-10 PROBLEM — I10 HYPERTENSION: Status: ACTIVE | Noted: 2022-05-10

## 2022-05-10 PROBLEM — E78.00 HYPERCHOLESTEROLEMIA: Status: ACTIVE | Noted: 2022-05-10

## 2022-05-10 PROBLEM — E07.9 THYROID DISORDER: Status: ACTIVE | Noted: 2022-05-10

## 2022-05-10 PROBLEM — Z83.42 FAMILY HISTORY OF HYPERCHOLESTEROLEMIA: Status: ACTIVE | Noted: 2022-05-10

## 2022-05-10 PROBLEM — Z82.49 FAMILY HISTORY OF HYPERTENSION: Status: ACTIVE | Noted: 2022-05-10

## 2022-05-11 ENCOUNTER — NON-APPOINTMENT (OUTPATIENT)
Age: 82
End: 2022-05-11

## 2022-05-11 LAB — BACTERIA UR CULT: NORMAL

## 2022-05-12 ENCOUNTER — APPOINTMENT (OUTPATIENT)
Dept: ORTHOPEDIC SURGERY | Facility: CLINIC | Age: 82
End: 2022-05-12
Payer: MEDICARE

## 2022-05-12 DIAGNOSIS — M19.071 PRIMARY OSTEOARTHRITIS, RIGHT ANKLE AND FOOT: ICD-10-CM

## 2022-05-12 DIAGNOSIS — M17.12 UNILATERAL PRIMARY OSTEOARTHRITIS, LEFT KNEE: ICD-10-CM

## 2022-05-12 DIAGNOSIS — M67.911 UNSPECIFIED DISORDER OF SYNOVIUM AND TENDON, RIGHT SHOULDER: ICD-10-CM

## 2022-05-12 DIAGNOSIS — M19.072 PRIMARY OSTEOARTHRITIS, RIGHT ANKLE AND FOOT: ICD-10-CM

## 2022-05-12 PROCEDURE — 99214 OFFICE O/P EST MOD 30 MIN: CPT | Mod: 25

## 2022-05-12 PROCEDURE — 20606 DRAIN/INJ JOINT/BURSA W/US: CPT | Mod: RT,59

## 2022-05-12 PROCEDURE — 20610 DRAIN/INJ JOINT/BURSA W/O US: CPT | Mod: LT

## 2022-05-12 NOTE — PHYSICAL EXAM
[de-identified] : Constitutional: Well-nourished, well-developed, No acute distress\par Respiratory: Good respiratory effort, no SOB\par Lymphatic: No regional lymphadenopathy, no lymphedema\par Psychiatric: Pleasant and normal affect, alert and oriented x3\par Musculoskeletal: normal except where as noted in regional exam\par \par Left knee:\par APPEARANCE: + enlargement of the distal femur and proximal tibia, varus deformity, no swelling\par POSITIVE TENDERNESS: Distal femur, proximal tibia, medial jt line/retinaculum, and lateral jt line/retinaculum\par NONTENDER: patellar & quadriceps tendons, MCL/LCL, ITB at the lateral femoral condyle & Gerdy's tubercle, pes bursa. \par ROM: full extension, limited flexion to 115° due to stiffness and pain. \par RESISTIVE TESTING: painless resisted knee flex/ext, although + crepitus felt in anterior knee. \par SPECIAL TESTS: stable v/v stress. painless grind. neg ant/post drawer. + Fernando's for pain in medial and lateral joint line. \par \par Bilateral ankles:\par APPEARANCE: Very mild lateral ankle swelling, + pes planus, hyperpronation, and mild navicular drop\par POSITIVE TENDERNESS: Anterior ankle mortise, lateral ankle joint line, sinus tarsus\par NONTENDER: medial malleolus, lateral malleolus, tibialis posterior tendon, achilles tendon, no marked thickening of tendon, ATFL, CFL, PTFL, deltoid ligaments, 5th metatarsal. \par RANGE OF MOTION: Mildly limited in all directions due to stiffness and pain\par RESISTIVE TESTING: Mildly painful 4/5 resisted dorsiflexion, plantar flexion, inversion & eversion. \par \par Right shoulder:\par APPEARANCE: no swelling/ecchymoses, no marked deformities or malalignment\par POSITIVE TENDERNESS: Moderately around the anterior, lateral and posterior shoulder areas\par NONTENDER: AC joint \par ROM: Significantly limited in all directions, flexion limited to 45 deg, abduction limited to 30 deg\par RESISTIVE TESTING: painful 3/5 IR 2/5 flex/ext, empty can/ER \par

## 2022-05-12 NOTE — HISTORY OF PRESENT ILLNESS
[de-identified] : Patient is here for right ankle, left knee pain follow up. She is requesting repeat injections. There has been no recent injury. \par \par Patient was also here requesting evaluation and management of right shoulder pain.  Patient states she has a history of rotator cuff tear, she was reaching for a car door yesterday and felt a sudden pull in her shoulder and has been having shoulder and upper arm pain since that time.  She is having difficulty moving her arm.  No numbness/tingling, no radiation of pain.

## 2022-05-12 NOTE — DISCUSSION/SUMMARY
[de-identified] : Patient was seen today for evaluation management of acute right shoulder pain and dysfunction due to rotator cuff disorder.  Patient has prior known rotator cuff tear, she was performing a simple ADL where she was reaching for a car door when she had the new onset of right shoulder pain, she appears to have acute exacerbation of her chronic rotator cuff dysfunction.  Patient is having significantly limited range of motion at this time.  Patient will be started on a course of physical therapy to restore normal range of motion and strength as tolerated.  Recommend follow-up in 2 weeks if there is no significant interval improvement in her condition.\par Patient was seen today for reevaluation of chronic left knee pain with recent atraumatic exacerbation due to underlying osteoarthritis.  We already proceeded with cortisone injection and hyaluronic acid injection into this knee, as advised at last visit it was too soon to repeat cortisone injection, patient is now requesting repeat cortisone injection today since it has been the minimum 1 month since last injection.  We discussed various treatment options as well as associated risk/benefits/alternatives and patient elected to proceed with cortisone injection today (see procedure note).  Informed the patient that the numbing medicine in today's injection will last for about 4-6 hours. The steroid that was injected will start to work in 1 to 2 days, peak at 1-2 weeks, and may last up to 1-2 months.  Patient is advised that we cannot continually just repeat injections when they are not providing any long-lasting relief.  This is now the patient's third injection in the last 3 months, she has had 2 cortisone injections, as well as a Monovisc injection.  If she continues to have knee pain we cannot consider repeat cortisone injection for at least 3 months.  If she does not have improvement with these injections I would recommend consultation regarding total knee replacement surgery, or a pain management consultation.  The risks of repeat cortisone injections outweigh the limited benefit that the patient has received thus far.\par Patient was also seen today for reevaluation of chronic right ankle pain due to severe osteoarthritis with recent atraumatic exacerbation.  Again, she had limited relief from last cortisone injection and is requesting a repeat injection at this time.  We discussed various treatment options as well as associated risk/benefits/alternatives and patient elected to proceed with cortisone injection today (see procedure note).  Informed the patient that the numbing medicine in today's injection will last for about 4-6 hours. The steroid that was injected will start to work in 1 to 2 days, peak at 1-2 weeks, and may last up to 1-2 months.  Patient is advised if continues to have significant pain without relief from injections I would recommend consultation with one of my [facility] for discussion of possible surgical intervention, and if she is not a surgical candidate then I would recommend pain management consultation.\par Patient appreciates and agrees with current plan.\par \par I work as part of an academic orthopedic group and routinely have a physician in training (resident / fellow) working with me.  Any part of the history and physical exam performed by the physician in training was either directly reviewed and/or replicated by myself.  Any procedure performed by the physician in training was performed under my direct supervision and with the consent of the patient.\par \par This note was generated using dragon medical dictation software.  A reasonable effort has been made for proofreading its contents, but typos may still remain.  If there are any questions or points of clarification needed please notify my office.

## 2022-05-12 NOTE — REASON FOR VISIT
[Follow-Up Visit] : a follow-up visit for [FreeTextEntry2] : right foot/left knee pain, right shoulder pain

## 2022-05-18 ENCOUNTER — NON-APPOINTMENT (OUTPATIENT)
Age: 82
End: 2022-05-18

## 2022-05-18 LAB
APPEARANCE: CLEAR
BACTERIA: NEGATIVE
BILIRUBIN URINE: NEGATIVE
BLOOD URINE: NEGATIVE
CALCIUM OXALATE CRYSTALS: ABNORMAL
COLOR: NORMAL
GLUCOSE QUALITATIVE U: NEGATIVE
HYALINE CASTS: 1 /LPF
KETONES URINE: NEGATIVE
LEUKOCYTE ESTERASE URINE: NEGATIVE
MICROSCOPIC-UA: NORMAL
NITRITE URINE: NEGATIVE
PH URINE: 6
PROTEIN URINE: NORMAL
RED BLOOD CELLS URINE: 2 /HPF
SPECIFIC GRAVITY URINE: 1.02
SQUAMOUS EPITHELIAL CELLS: 8 /HPF
UROBILINOGEN URINE: NORMAL
WHITE BLOOD CELLS URINE: 4 /HPF

## 2022-05-24 ENCOUNTER — NON-APPOINTMENT (OUTPATIENT)
Age: 82
End: 2022-05-24

## 2022-06-06 ENCOUNTER — APPOINTMENT (OUTPATIENT)
Dept: UROGYNECOLOGY | Facility: CLINIC | Age: 82
End: 2022-06-06
Payer: MEDICARE

## 2022-06-06 VITALS — TEMPERATURE: 96.8 F | DIASTOLIC BLOOD PRESSURE: 80 MMHG | SYSTOLIC BLOOD PRESSURE: 125 MMHG

## 2022-06-06 PROCEDURE — 99213 OFFICE O/P EST LOW 20 MIN: CPT

## 2022-06-06 NOTE — HISTORY OF PRESENT ILLNESS
[FreeTextEntry1] : 83y/o female with h/o LACEY, urge predominant who presents for reassessment following initial visit.  She was advised on behavioral modifications and to perform a voiding diary.  According to diary, patient voids on average twice at night ~400-500ml of urine.  She reports urinary urgency with occasional leakage of urine prior to reaching bathroom.  Daytime urgency is usually 4 hours following liquid intake of ~900ml of mixed beverages (water and 1-2 cups of coffee) and leaks ~ two times.  She has not been tried on medications due to fear of side effects.

## 2022-06-06 NOTE — PHYSICAL EXAM
[No Acute Distress] : in no acute distress [Well developed] : well developed [Well Nourished] : ~L well nourished [Oriented x3] : oriented to person, place, and time [Warm and Dry] : was warm and dry to touch [Tenderness] : ~T no ~M abdominal tenderness observed [Distended] : not distended [Normal Gait] : gait was abnormal [de-identified] : 2+pedal edema

## 2022-06-06 NOTE — DISCUSSION/SUMMARY
[FreeTextEntry1] : 1.  OAB\par -Treatment options once again discussed, she was advised to continue behavioral modifications including limitation of bladder irritants.  \par  -Timed voids \par -PTNS explained to patient, she would like to start PTNS explained that she must commit to 12 consecutive weeks and that maintenance every month would be needed.  She agrees with and understands plan.  She was also discussed medications which she is not interested in due to side effects. Sacral neuromodulation and intravesical Botox were also discussed which she would like to consider if PTNS fails. \par -Advised patient to wear support stocking and or limb elevation as finding of pedal edema.  \par -Advised to followup with PMD to discuss treatment and work-up for pedal edema\par \par 2. SALAZAR \par -Kegel exercises\par -Weight reduction\par \par RTO for PTNS  advised patient to contact office sooner prn \par

## 2022-06-08 ENCOUNTER — NON-APPOINTMENT (OUTPATIENT)
Age: 82
End: 2022-06-08

## 2022-06-16 ENCOUNTER — APPOINTMENT (OUTPATIENT)
Dept: UROGYNECOLOGY | Facility: CLINIC | Age: 82
End: 2022-06-16
Payer: MEDICARE

## 2022-06-16 VITALS — TEMPERATURE: 96.8 F | DIASTOLIC BLOOD PRESSURE: 70 MMHG | SYSTOLIC BLOOD PRESSURE: 110 MMHG

## 2022-06-16 PROCEDURE — 64566 NEUROELTRD STIM POST TIBIAL: CPT

## 2022-06-23 ENCOUNTER — APPOINTMENT (OUTPATIENT)
Dept: UROGYNECOLOGY | Facility: CLINIC | Age: 82
End: 2022-06-23
Payer: MEDICARE

## 2022-06-23 VITALS — TEMPERATURE: 97.5 F

## 2022-06-23 PROCEDURE — 64566 NEUROELTRD STIM POST TIBIAL: CPT

## 2022-06-30 ENCOUNTER — APPOINTMENT (OUTPATIENT)
Dept: UROGYNECOLOGY | Facility: CLINIC | Age: 82
End: 2022-06-30

## 2022-06-30 VITALS — TEMPERATURE: 97.5 F | DIASTOLIC BLOOD PRESSURE: 80 MMHG | SYSTOLIC BLOOD PRESSURE: 122 MMHG

## 2022-06-30 PROCEDURE — 64566 NEUROELTRD STIM POST TIBIAL: CPT

## 2022-07-08 ENCOUNTER — APPOINTMENT (OUTPATIENT)
Dept: UROGYNECOLOGY | Facility: CLINIC | Age: 82
End: 2022-07-08

## 2022-07-15 ENCOUNTER — APPOINTMENT (OUTPATIENT)
Dept: UROGYNECOLOGY | Facility: CLINIC | Age: 82
End: 2022-07-15

## 2022-07-15 PROCEDURE — 64566 NEUROELTRD STIM POST TIBIAL: CPT

## 2022-07-22 ENCOUNTER — APPOINTMENT (OUTPATIENT)
Dept: UROGYNECOLOGY | Facility: CLINIC | Age: 82
End: 2022-07-22

## 2022-07-29 ENCOUNTER — APPOINTMENT (OUTPATIENT)
Dept: UROGYNECOLOGY | Facility: CLINIC | Age: 82
End: 2022-07-29

## 2022-07-29 PROCEDURE — 64566 NEUROELTRD STIM POST TIBIAL: CPT

## 2022-08-03 ENCOUNTER — APPOINTMENT (OUTPATIENT)
Dept: UROGYNECOLOGY | Facility: CLINIC | Age: 82
End: 2022-08-03

## 2022-08-03 PROCEDURE — 64566 NEUROELTRD STIM POST TIBIAL: CPT

## 2022-08-11 ENCOUNTER — APPOINTMENT (OUTPATIENT)
Dept: UROGYNECOLOGY | Facility: CLINIC | Age: 82
End: 2022-08-11

## 2022-08-11 PROCEDURE — 64566 NEUROELTRD STIM POST TIBIAL: CPT

## 2022-08-17 ENCOUNTER — APPOINTMENT (OUTPATIENT)
Dept: UROGYNECOLOGY | Facility: CLINIC | Age: 82
End: 2022-08-17

## 2022-08-17 PROCEDURE — 64566 NEUROELTRD STIM POST TIBIAL: CPT

## 2022-08-23 NOTE — H&P ADULT - NSHPPHYSICALEXAM_GEN_ALL_CORE
Pt informed. PHYSICAL EXAM:   GENERAL: Alert. Not confused. No acute distress.    HEAD:   +ecchymosis around bilateral orbits and nasal bridge, EOMI, +mild ttp of the maxilla bilaterally and nasal bridge.   EYES: EOMI. PERRLA. Normal conjunctiva/sclera.  ENT: Neck supple. No JVD. Moist oral mucosa.    CARDIAC: No tachy, Not mulu. Regular rhythm. Not irregularly irregular. S1. S2. No murmur. No rub. No distant heart sounds.  LUNG/CHEST: CTAB. BS equal bilaterally. No wheezes. No rales. No rhonchi.  ABDOMEN: Soft. No tenderness. No distension. No fluid wave. Normal bowel sounds.  BACK: No midline/vertebral tenderness. No flank tenderness.  VASCULAR: +2 b/l radial  pulses. Palpable DP pulses.  EXTREMITIES:  +ecchymosis over R shoulder w/ some tenderness to palpation;  No edema. Moving all 4.  NEUROLOGY: A&Ox3. Non-focal exam. Cranial nerves intact. Normal speech. Sensation intact.  PSYCH: Normal behavior. Normal affect.  SKIN: No jaundice. No erythema. No rash/lesion.  Vascular Access:     ICU Vital Signs Last 24 Hrs  T(C): 37 (08 Jan 2022 02:58), Max: 37.1 (07 Jan 2022 22:10)  T(F): 98.6 (08 Jan 2022 02:58), Max: 98.7 (07 Jan 2022 22:10)  HR: 81 (08 Jan 2022 02:58) (76 - 84)  BP: 134/76 (08 Jan 2022 02:58) (125/71 - 147/77)  BP(mean): --  ABP: --  ABP(mean): --  RR: 18 (08 Jan 2022 02:58) (18 - 20)  SpO2: 95% (08 Jan 2022 02:58) (94% - 95%)      I&O's Summary

## 2022-08-25 ENCOUNTER — APPOINTMENT (OUTPATIENT)
Dept: UROGYNECOLOGY | Facility: CLINIC | Age: 82
End: 2022-08-25

## 2022-08-25 PROCEDURE — 64566 NEUROELTRD STIM POST TIBIAL: CPT

## 2022-08-29 NOTE — ED PROVIDER NOTE - WR INTERPRETATION 2
Preceptor Attestation:   Patient seen, evaluated and discussed with the resident. I have verified the content of the note, which accurately reflects my assessment of the patient and the plan of care.   Supervising Physician:  Teofilo Tarango MD    no obvious fx, +DJD -- Leon OLIVEIRA

## 2022-09-01 ENCOUNTER — APPOINTMENT (OUTPATIENT)
Dept: UROGYNECOLOGY | Facility: CLINIC | Age: 82
End: 2022-09-01

## 2022-09-01 VITALS — TEMPERATURE: 96.6 F

## 2022-09-01 PROCEDURE — 64566 NEUROELTRD STIM POST TIBIAL: CPT

## 2022-09-08 ENCOUNTER — APPOINTMENT (OUTPATIENT)
Dept: UROGYNECOLOGY | Facility: CLINIC | Age: 82
End: 2022-09-08

## 2022-09-08 VITALS — TEMPERATURE: 96 F

## 2022-09-08 PROCEDURE — 64566 NEUROELTRD STIM POST TIBIAL: CPT

## 2022-09-15 ENCOUNTER — APPOINTMENT (OUTPATIENT)
Dept: VASCULAR SURGERY | Facility: CLINIC | Age: 82
End: 2022-09-15

## 2022-09-15 ENCOUNTER — APPOINTMENT (OUTPATIENT)
Dept: UROGYNECOLOGY | Facility: CLINIC | Age: 82
End: 2022-09-15

## 2022-09-15 VITALS
WEIGHT: 195 LBS | DIASTOLIC BLOOD PRESSURE: 79 MMHG | SYSTOLIC BLOOD PRESSURE: 137 MMHG | TEMPERATURE: 96 F | BODY MASS INDEX: 35.88 KG/M2 | HEART RATE: 79 BPM | HEIGHT: 62 IN

## 2022-09-15 PROCEDURE — 99204 OFFICE O/P NEW MOD 45 MIN: CPT

## 2022-09-15 PROCEDURE — 93970 EXTREMITY STUDY: CPT

## 2022-09-16 NOTE — HISTORY OF PRESENT ILLNESS
[FreeTextEntry1] : \par Ms. AYSE GARY is a 82 year who presents for evaluation of bilateral leg pain for the past 6 months.\par \par Patient's leg discomfort is associated with leg swelling, fatigue, heaviness, achiness, restlessness, muscle cramping, itchiness, dry, flaky skin, and skin darkening at the ankles.\par \par Patient complains of bilateral lower extremity painful varicose veins.\par \par Patient's symptoms have persisted despite conservative management with leg elevation, exercise, attempted weight loss, over-the-counter medications (ibuprofen), and compression stocking use for more than 3 months.\par \par Patient's symptoms interfere with the patient's ADLs such as work, shopping and housekeeping. \par \par

## 2022-09-16 NOTE — PHYSICAL EXAM
[2+] : left 2+ [de-identified] : Alert and oriented, no acute distress [de-identified] : Breathing comfortably on room air [FreeTextEntry1] : LE vein findings:\par \par Varicosities varicose bilateral \par \par Edema  bilateral \par \par Skin changes  dry, flaky skin, stasis dermatitis, hyperpigmentation in the gator area, lipodermatosclerosis, \par \par no Ulcer\par \par CEAP classification C 4\par \par Palpable DP pulses bilaterally\par

## 2022-09-16 NOTE — ASSESSMENT
[FreeTextEntry1] : Ms. AYSE GARY is a 82 year with persistent and worsening bilateral lower extremity venous insufficiency, CEAP classification C 4 which is unresponsive to at least 3 months of compression stockings 20-30 mmHg, leg elevation, exercise and over the counter pain medication_(Ibuprofen).  Patient has complaints of worsening  bilateral leg discomfort with swelling, fatigue, heaviness, achiness, cramping, restlessness, and painful varicosities.  The patient’s symptoms interfere with their ADL’s, such as walking, shopping and house work, . Patient has intact pulses in both legs without evidence of arterial insufficiency. \par \par  \par \par Treatment is indicated not for cosmetic reasons but for symptomatic venous reflux disease with symptoms which is refractory to conservative therapy. Venous duplex study demonstrates bilateral lower extremity venous insufficiency. The need for definitive effective treatment is based on severe, interfering and worsening reflux symptoms with evidence of venous insufficiency on venous ultrasound.\par \par  \par \par Patient is a candidate for endovenous ablation treatment of the  bilateral GSV.\par \par The risks and benefits of endovenous ablation treatment versus continued conservative management were discussed with the patient.  Patient chooses endovenous ablation treatments. Treatment plan to be scheduled.\par \par Plan\par Right GSV RFA\par Then left GSV RFA\par \par

## 2022-09-16 NOTE — DATA REVIEWED
[FreeTextEntry1] : Venous duplex examination 9/15/2022 revealed that patient has significant bilateral axial reflux in greater saphenous vein greater than 1.5-second.  Right greater saphenous vein measures 6.2 at the junction and 7 mm in mid thigh.  Left saphenous vein also has significant reflux greater than 0.5-second and measures 7.5 mm at the junction and 5.9 distally.

## 2022-09-21 ENCOUNTER — APPOINTMENT (OUTPATIENT)
Dept: UROGYNECOLOGY | Facility: CLINIC | Age: 82
End: 2022-09-21

## 2022-09-21 PROCEDURE — 64566 NEUROELTRD STIM POST TIBIAL: CPT

## 2022-10-13 DIAGNOSIS — I83.899 VARICOSE VEINS OF UNSPECIFIED LOWER EXTREMITY WITH OTHER COMPLICATIONS: ICD-10-CM

## 2022-10-18 ENCOUNTER — APPOINTMENT (OUTPATIENT)
Dept: VASCULAR SURGERY | Facility: CLINIC | Age: 82
End: 2022-10-18

## 2022-10-18 PROCEDURE — 36475 ENDOVENOUS RF 1ST VEIN: CPT | Mod: RT

## 2022-10-18 RX ORDER — LIDOCAINE HYDROCHLORIDE 10 MG/ML
1 INJECTION, SOLUTION INFILTRATION
Qty: 50 | Refills: 0 | Status: COMPLETED | COMMUNITY
Start: 2022-10-13 | End: 2022-10-18

## 2022-10-18 RX ORDER — SODIUM BICARBONATE 84 MG/ML
8.4 INJECTION, SOLUTION INTRAVENOUS
Qty: 5 | Refills: 0 | Status: COMPLETED | COMMUNITY
Start: 2022-10-13 | End: 2022-10-18

## 2022-10-18 RX ORDER — PAROXETINE HYDROCHLORIDE 20 MG/1
20 TABLET, FILM COATED ORAL
Qty: 90 | Refills: 0 | Status: ACTIVE | COMMUNITY
Start: 2021-09-10

## 2022-10-18 RX ORDER — BENAZEPRIL HYDROCHLORIDE 40 MG/1
40 TABLET, FILM COATED ORAL
Qty: 90 | Refills: 0 | Status: ACTIVE | COMMUNITY
Start: 2022-07-26

## 2022-10-18 RX ORDER — HYALURONATE SODIUM, STABILIZED 88 MG/4 ML
88 SYRINGE (ML) INTRAARTICULAR
Qty: 1 | Refills: 0 | Status: COMPLETED | COMMUNITY
Start: 2022-03-04 | End: 2022-10-18

## 2022-10-18 RX ORDER — ATORVASTATIN CALCIUM 20 MG/1
20 TABLET, FILM COATED ORAL
Qty: 90 | Refills: 0 | Status: ACTIVE | COMMUNITY
Start: 2022-10-17

## 2022-10-18 NOTE — PROCEDURE
[FreeTextEntry1] : right GSV RFA [FreeTextEntry3] : Procedural safety checklist and time out completed:\par Confirmed patient identification (Patient Name, , and/or medical record number including when possible affirmation by patient or parent/family/other).\par Confirmed procedure with the patient. Consent present, accurate and signed. \par Confirmed special equipment and supplies are present.\par Sterility confirmed. Position verified. \par Site/ side is marked and visible and confirmed. \par Procedure confirmed by consent. Accurate consent including side and site.\par Review of medical records, including venous ultrasound, noting correct procedure including site and side.\par MD/PA verifies presence and review of imaging studies and or written report of imaging studies.\par Agreement on the procedure to be performed\par Time out completed.\par All of the above has been confirmed by the team.\par All patient-specific concerns have been addressed. \par \par Indication: right lower extremity varicose veins with inflammation, leg pain, leg swelling, and leg cramping.  Venous insufficiency/ reflux.\par \par Procedure: radiofrequency ablation of the right great saphenous vein. \par 	\par Ms. AYSE GARY is a 82 year old F with a history of right lower extremity varicose veins previously seen in the office.  Ultrasound examination demonstrated venous insufficiency. A trial of compression stockings, exercise, elevation, and pain medication was attempted without relief and definitive treatment with radiofrequency ablation was offered. \par \par The patient has come for radiofrequency ablation treatment of the right great saphenous vein.\par I have discussed the risks of the procedure at length with the patient. The risks discussed were inclusive of but not limited to infection, irritation at the site of infiltration of local anesthesia, and also rare risk of deep venous thrombosis and pulmonary emboli. The patient agrees to proceed with the procedure. \par The patient was escorted into the procedure room and a time out called.\par The entire limb was prepped and draped in sterile fashion. The RF fiber was placed on the sterile field and connected by a sterile cable. Actuation, temperature, and impedance testing were performed to ensure that all components were connected and operating properly. \par The patient was placed on the procedure table and local anesthesia was instilled in the skin overlying the access site. Under ultrasound guidance, the vein was punctured with a micropuncture needle, using the anterior wall technique. A guide wire was now introduced through the needle, and the needle was then exchanged over the guide wire for a 7F sheath. The guide wire was removed and the RF probe was then placed into the right great saphenous vein through the sheath and position confirmed using ultrasound guidance. After the RF probe position was verified by ultrasound, tumescent anesthesia consisting of normal saline, 1% lidocaine with 8.4% sodium bicarbonate was infiltrated, under ultrasound guidance, precisely into the perivenous compartment along the entire length of the vein until a “halo” of fluid was noted around the vein. After RF probe position was again confirmed with ultrasound imaging, RF energy was applied. The probe was gradually and carefully withdrawn at a rate of 6.5cm/20seconds. \par \par 5 cycles of RF performed using the 7 cm probe\par Total treatment time was 100 seconds.\par The total volume injected was 250 cc\par Treatment length was 26 cm and \par The probe is 3.6 cm from the SFJ.\par \par Estimated Blood Loss: minimal\par Repeat ultrasound of the treated vein was performed confirming successful treatment. The catheter and sheath were withdrawn and hemostasis established with direct pressure. After assuring hemostasis, a sterile 4x4 was placed on the access site and an ACE compression wrap was applied. Patient tolerated procedure well. Patient was given post-procedure instructions and follow up appointment was scheduled.\par \par \par

## 2022-10-24 ENCOUNTER — APPOINTMENT (OUTPATIENT)
Dept: VASCULAR SURGERY | Facility: CLINIC | Age: 82
End: 2022-10-24

## 2022-10-24 PROCEDURE — 93971 EXTREMITY STUDY: CPT | Mod: LT

## 2022-10-25 RX ORDER — LIDOCAINE HYDROCHLORIDE 10 MG/ML
1 INJECTION, SOLUTION INFILTRATION; PERINEURAL
Qty: 50 | Refills: 0 | Status: COMPLETED | COMMUNITY
Start: 2022-10-25 | End: 2022-10-26

## 2022-10-25 RX ORDER — SODIUM BICARBONATE 84 MG/ML
8.4 INJECTION, SOLUTION INTRAVENOUS
Qty: 5 | Refills: 0 | Status: COMPLETED | COMMUNITY
Start: 2022-10-25 | End: 2022-10-26

## 2022-10-26 ENCOUNTER — APPOINTMENT (OUTPATIENT)
Dept: VASCULAR SURGERY | Facility: CLINIC | Age: 82
End: 2022-10-26

## 2022-10-26 PROCEDURE — 36475 ENDOVENOUS RF 1ST VEIN: CPT | Mod: LT

## 2022-10-26 NOTE — PROCEDURE
[FreeTextEntry1] : left GSV RFA [FreeTextEntry3] : Procedural safety checklist and time out completed:\par Confirmed patient identification (Patient Name, , and/or medical record number including when possible affirmation by patient or parent/family/other).\par Confirmed procedure with the patient. Consent present, accurate and signed. \par Confirmed special equipment and supplies are present.\par Sterility confirmed. Position verified. \par Site/ side is marked and visible and confirmed. \par Procedure confirmed by consent. Accurate consent including side and site.\par Review of medical records, including venous ultrasound, noting correct procedure including site and side.\par MD/PA verifies presence and review of imaging studies and or written report of imaging studies.\par Agreement on the procedure to be performed\par Time out completed.\par All of the above has been confirmed by the team.\par All patient-specific concerns have been addressed. \par \par Indication: left lower extremity varicose veins with inflammation, leg pain, leg swelling, and leg cramping.  Venous insufficiency/ reflux.\par \par Procedure: radiofrequency ablation of the left great saphenous vein. \par 	\par Ms. AYSE GARY is a 82 year old F with a history of left lower extremity varicose veins previously seen in the office.  Ultrasound examination demonstrated venous insufficiency. A trial of compression stockings, exercise, elevation, and pain medication was attempted without relief and definitive treatment with radiofrequency ablation was offered. \par \par The patient has come for radiofrequency ablation treatment of the left great saphenous vein.\par I have discussed the risks of the procedure at length with the patient. The risks discussed were inclusive of but not limited to infection, irritation at the site of infiltration of local anesthesia, and also rare risk of deep venous thrombosis and pulmonary emboli. The patient agrees to proceed with the procedure. \par The patient was escorted into the procedure room and a time out called.\par The entire limb was prepped and draped in sterile fashion. The RF fiber was placed on the sterile field and connected by a sterile cable. Actuation, temperature, and impedance testing were performed to ensure that all components were connected and operating properly. \par The patient was placed on the procedure table and local anesthesia was instilled in the skin overlying the access site. Under ultrasound guidance, the vein was punctured with a micropuncture needle, using the anterior wall technique. A guide wire was now introduced through the needle, and the needle was then exchanged over the guide wire for a 7F sheath. The guide wire was removed and the RF probe was then placed into the left great saphenous vein through the sheath and position confirmed using ultrasound guidance. After the RF probe position was verified by ultrasound, tumescent anesthesia consisting of normal saline, 1% lidocaine with 8.4% sodium bicarbonate was infiltrated, under ultrasound guidance, precisely into the perivenous compartment along the entire length of the vein until a “halo” of fluid was noted around the vein. After RF probe position was again confirmed with ultrasound imaging, RF energy was applied. The probe was gradually and carefully withdrawn at a rate of 6.5cm/20seconds. \par \par 7 cycles of RF performed using the 7 cm probe\par Total treatment time was 140 seconds.\par The total volume injected was 450  cc\par Treatment length was 39 cm and \par The probe is 3.6 cm from the SFJ.\par \par Estimated Blood Loss: minimal\par Repeat ultrasound of the treated vein was performed confirming successful treatment. The catheter and sheath were withdrawn and hemostasis established with direct pressure. After assuring hemostasis, a sterile 4x4 was placed on the access site and an ACE compression wrap was applied. Patient tolerated procedure well. Patient was given post-procedure instructions and follow up appointment was scheduled.\par \par \par

## 2022-10-28 ENCOUNTER — APPOINTMENT (OUTPATIENT)
Dept: UROGYNECOLOGY | Facility: CLINIC | Age: 82
End: 2022-10-28

## 2022-10-28 VITALS — TEMPERATURE: 97.1 F

## 2022-10-28 DIAGNOSIS — R35.0 FREQUENCY OF MICTURITION: ICD-10-CM

## 2022-10-28 LAB
BILIRUB UR QL STRIP: NEGATIVE
CLARITY UR: CLEAR
COLLECTION METHOD: NORMAL
GLUCOSE UR-MCNC: NEGATIVE
HCG UR QL: 0.2 EU/DL
HGB UR QL STRIP.AUTO: NEGATIVE
KETONES UR-MCNC: NEGATIVE
LEUKOCYTE ESTERASE UR QL STRIP: NEGATIVE
NITRITE UR QL STRIP: NEGATIVE
PH UR STRIP: 5.5
PROT UR STRIP-MCNC: NEGATIVE
SP GR UR STRIP: 1.02

## 2022-10-28 PROCEDURE — 64566 NEUROELTRD STIM POST TIBIAL: CPT

## 2022-10-28 PROCEDURE — 99214 OFFICE O/P EST MOD 30 MIN: CPT | Mod: 25

## 2022-10-28 PROCEDURE — 81003 URINALYSIS AUTO W/O SCOPE: CPT | Mod: QW

## 2022-10-28 NOTE — PROCEDURE
[Straight Catheterization] : insertion of a straight catheter [Urinary Frequency] : urinary frequency [Patient] : the patient [Allergies Reviewed] : Allergies reviewed [None] : none [___ Fr Straight Tip] : a [unfilled] in [place] straight tip catheter [Clear] : clear [No Complications] : no complications [Tolerated Well] : the patient tolerated the procedure well

## 2022-10-28 NOTE — PHYSICAL EXAM
[No Acute Distress] : in no acute distress [Well developed] : well developed [Well Nourished] : ~L well nourished [Good Hygeine] : demonstrates good hygeine [Oriented x3] : oriented to person, place, and time [Normal Memory] : ~T memory was ~L unimpaired [Normal Mood/Affect] : mood and affect are normal [Anxiety] : patient is anxious [Warm and Dry] : was warm and dry to touch [Normal Gait] : gait was normal [Labia Majora] : were normal [Labia Minora] : were normal [Normal] : was normal [FreeTextEntry4] : bulge at vaginal introitus

## 2022-10-28 NOTE — HISTORY OF PRESENT ILLNESS
[FreeTextEntry1] : Sirisha is an 82 year old F who presents today for her first PTNS maintenance session.  Over the last 2 weeks she has noticed increased urinary frequency.  No dysuria, cloudy urine, or foul smelling urine.  Unsure if the urinary frequency is due to UTI versus prolonged interval between PTNS sessions.

## 2022-10-28 NOTE — DISCUSSION/SUMMARY
[FreeTextEntry1] : #Urinary frequency:\par -Udip is negative, no indication to send off UA and UCx\par -Frequency likely due to prolonged interval between PTNS sessions.\par \par All questions answered to the patient's satisfaction.  She expressed understanding. She knows to contact the office with any questions or concerns.\par

## 2022-10-31 ENCOUNTER — APPOINTMENT (OUTPATIENT)
Dept: VASCULAR SURGERY | Facility: CLINIC | Age: 82
End: 2022-10-31

## 2022-10-31 PROCEDURE — 93971 EXTREMITY STUDY: CPT | Mod: LT

## 2022-11-17 NOTE — DISCHARGE NOTE NURSING/CASE MANAGEMENT/SOCIAL WORK - NSTRANSFERHEARINGAID_GEN_A_NUR
left Otezla Pregnancy And Lactation Text: This medication is Pregnancy Category C and it isn't known if it is safe during pregnancy. It is unknown if it is excreted in breast milk.

## 2022-11-30 ENCOUNTER — APPOINTMENT (OUTPATIENT)
Dept: UROGYNECOLOGY | Facility: CLINIC | Age: 82
End: 2022-11-30

## 2022-11-30 VITALS
WEIGHT: 200 LBS | BODY MASS INDEX: 36.8 KG/M2 | HEIGHT: 62 IN | OXYGEN SATURATION: 95 % | SYSTOLIC BLOOD PRESSURE: 159 MMHG | DIASTOLIC BLOOD PRESSURE: 85 MMHG | HEART RATE: 86 BPM

## 2022-11-30 PROCEDURE — 64566 NEUROELTRD STIM POST TIBIAL: CPT

## 2022-12-01 ENCOUNTER — APPOINTMENT (OUTPATIENT)
Dept: VASCULAR SURGERY | Facility: CLINIC | Age: 82
End: 2022-12-01

## 2022-12-01 VITALS
HEIGHT: 62 IN | WEIGHT: 200 LBS | TEMPERATURE: 97.6 F | HEART RATE: 80 BPM | SYSTOLIC BLOOD PRESSURE: 134 MMHG | BODY MASS INDEX: 36.8 KG/M2 | DIASTOLIC BLOOD PRESSURE: 87 MMHG

## 2022-12-01 DIAGNOSIS — I89.0 LYMPHEDEMA, NOT ELSEWHERE CLASSIFIED: ICD-10-CM

## 2022-12-01 DIAGNOSIS — I83.892 VARICOSE VEINS OF LEFT LOWER EXTREMITY WITH OTHER COMPLICATIONS: ICD-10-CM

## 2022-12-01 PROCEDURE — 99213 OFFICE O/P EST LOW 20 MIN: CPT

## 2022-12-01 NOTE — ASSESSMENT
[FreeTextEntry1] : 82-year-old female with bilateral lower extremity swelling and mixed lymphedema and venous insufficiency.  She is status post bilateral lower extremity venous ablation and continues to have leg swelling.  This is persistent maximal conservative therapy with compression stocking and leg elevation.  At this point I recommend lymphedema pneumatic pump for symptomatic relief.

## 2022-12-01 NOTE — HISTORY OF PRESENT ILLNESS
[FreeTextEntry1] : 81-year-old female with bilateral lower extremity leg swelling.  She underwent bilateral lower GSV ablation and continues to have bilateral lower extremity swelling.  Patient complains of bilateral lower extremity swelling which is persistent after ablation.

## 2022-12-01 NOTE — PHYSICAL EXAM
[JVD] : no jugular venous distention  [Normal Breath Sounds] : Normal breath sounds [Normal Heart Sounds] : normal heart sounds [2+] : left 2+ [Abdomen Tenderness] : ~T ~M No abdominal tenderness [Alert] : alert [Calm] : calm

## 2022-12-21 ENCOUNTER — APPOINTMENT (OUTPATIENT)
Dept: UROGYNECOLOGY | Facility: CLINIC | Age: 82
End: 2022-12-21

## 2022-12-21 PROCEDURE — 64566 NEUROELTRD STIM POST TIBIAL: CPT

## 2023-01-17 ENCOUNTER — APPOINTMENT (OUTPATIENT)
Dept: UROGYNECOLOGY | Facility: CLINIC | Age: 83
End: 2023-01-17
Payer: MEDICARE

## 2023-01-17 DIAGNOSIS — N39.46 MIXED INCONTINENCE: ICD-10-CM

## 2023-01-17 PROCEDURE — 64566 NEUROELTRD STIM POST TIBIAL: CPT

## 2023-02-09 ENCOUNTER — APPOINTMENT (OUTPATIENT)
Dept: UROGYNECOLOGY | Facility: CLINIC | Age: 83
End: 2023-02-09
Payer: MEDICARE

## 2023-02-09 PROCEDURE — 64566 NEUROELTRD STIM POST TIBIAL: CPT

## 2023-03-08 ENCOUNTER — APPOINTMENT (OUTPATIENT)
Dept: UROGYNECOLOGY | Facility: CLINIC | Age: 83
End: 2023-03-08
Payer: MEDICARE

## 2023-03-08 VITALS
HEART RATE: 126 BPM | WEIGHT: 200 LBS | DIASTOLIC BLOOD PRESSURE: 85 MMHG | BODY MASS INDEX: 36.8 KG/M2 | SYSTOLIC BLOOD PRESSURE: 129 MMHG | OXYGEN SATURATION: 96 % | HEIGHT: 62 IN

## 2023-03-08 PROCEDURE — 64566 NEUROELTRD STIM POST TIBIAL: CPT

## 2023-03-22 ENCOUNTER — EMERGENCY (EMERGENCY)
Facility: HOSPITAL | Age: 83
LOS: 1 days | Discharge: HOME | End: 2023-03-22
Attending: EMERGENCY MEDICINE
Payer: MEDICARE

## 2023-03-22 VITALS
TEMPERATURE: 98 F | SYSTOLIC BLOOD PRESSURE: 169 MMHG | DIASTOLIC BLOOD PRESSURE: 82 MMHG | HEART RATE: 89 BPM | OXYGEN SATURATION: 94 % | RESPIRATION RATE: 18 BRPM

## 2023-03-22 VITALS
HEIGHT: 62 IN | RESPIRATION RATE: 18 BRPM | OXYGEN SATURATION: 96 % | TEMPERATURE: 98 F | SYSTOLIC BLOOD PRESSURE: 154 MMHG | WEIGHT: 199.96 LBS | HEART RATE: 87 BPM | DIASTOLIC BLOOD PRESSURE: 78 MMHG

## 2023-03-22 DIAGNOSIS — Z96.651 PRESENCE OF RIGHT ARTIFICIAL KNEE JOINT: Chronic | ICD-10-CM

## 2023-03-22 LAB
ALBUMIN SERPL ELPH-MCNC: 4.3 G/DL — SIGNIFICANT CHANGE UP (ref 3.3–5)
ALP SERPL-CCNC: 61 U/L — SIGNIFICANT CHANGE UP (ref 40–120)
ALT FLD-CCNC: 15 U/L — SIGNIFICANT CHANGE UP (ref 10–45)
ANION GAP SERPL CALC-SCNC: 11 MMOL/L — SIGNIFICANT CHANGE UP (ref 5–17)
APTT BLD: 30 SEC — SIGNIFICANT CHANGE UP (ref 27.5–35.5)
AST SERPL-CCNC: 18 U/L — SIGNIFICANT CHANGE UP (ref 10–40)
BASOPHILS # BLD AUTO: 0.03 K/UL — SIGNIFICANT CHANGE UP (ref 0–0.2)
BASOPHILS NFR BLD AUTO: 0.3 % — SIGNIFICANT CHANGE UP (ref 0–2)
BILIRUB SERPL-MCNC: 0.5 MG/DL — SIGNIFICANT CHANGE UP (ref 0.2–1.2)
BUN SERPL-MCNC: 24 MG/DL — HIGH (ref 7–23)
CALCIUM SERPL-MCNC: 9.8 MG/DL — SIGNIFICANT CHANGE UP (ref 8.4–10.5)
CHLORIDE SERPL-SCNC: 105 MMOL/L — SIGNIFICANT CHANGE UP (ref 96–108)
CO2 SERPL-SCNC: 24 MMOL/L — SIGNIFICANT CHANGE UP (ref 22–31)
CREAT SERPL-MCNC: 0.56 MG/DL — SIGNIFICANT CHANGE UP (ref 0.5–1.3)
EGFR: 90 ML/MIN/1.73M2 — SIGNIFICANT CHANGE UP
EOSINOPHIL # BLD AUTO: 0.18 K/UL — SIGNIFICANT CHANGE UP (ref 0–0.5)
EOSINOPHIL NFR BLD AUTO: 1.9 % — SIGNIFICANT CHANGE UP (ref 0–6)
FLUAV AG NPH QL: SIGNIFICANT CHANGE UP
FLUBV AG NPH QL: SIGNIFICANT CHANGE UP
GLUCOSE SERPL-MCNC: 98 MG/DL — SIGNIFICANT CHANGE UP (ref 70–99)
HCT VFR BLD CALC: 43.1 % — SIGNIFICANT CHANGE UP (ref 34.5–45)
HGB BLD-MCNC: 13.5 G/DL — SIGNIFICANT CHANGE UP (ref 11.5–15.5)
IMM GRANULOCYTES NFR BLD AUTO: 0.1 % — SIGNIFICANT CHANGE UP (ref 0–0.9)
INR BLD: 1.05 RATIO — SIGNIFICANT CHANGE UP (ref 0.88–1.16)
LYMPHOCYTES # BLD AUTO: 1.07 K/UL — SIGNIFICANT CHANGE UP (ref 1–3.3)
LYMPHOCYTES # BLD AUTO: 11.5 % — LOW (ref 13–44)
MCHC RBC-ENTMCNC: 28.5 PG — SIGNIFICANT CHANGE UP (ref 27–34)
MCHC RBC-ENTMCNC: 31.3 GM/DL — LOW (ref 32–36)
MCV RBC AUTO: 90.9 FL — SIGNIFICANT CHANGE UP (ref 80–100)
MONOCYTES # BLD AUTO: 0.89 K/UL — SIGNIFICANT CHANGE UP (ref 0–0.9)
MONOCYTES NFR BLD AUTO: 9.6 % — SIGNIFICANT CHANGE UP (ref 2–14)
NEUTROPHILS # BLD AUTO: 7.1 K/UL — SIGNIFICANT CHANGE UP (ref 1.8–7.4)
NEUTROPHILS NFR BLD AUTO: 76.6 % — SIGNIFICANT CHANGE UP (ref 43–77)
NRBC # BLD: 0 /100 WBCS — SIGNIFICANT CHANGE UP (ref 0–0)
PLATELET # BLD AUTO: 232 K/UL — SIGNIFICANT CHANGE UP (ref 150–400)
POTASSIUM SERPL-MCNC: 4 MMOL/L — SIGNIFICANT CHANGE UP (ref 3.5–5.3)
POTASSIUM SERPL-SCNC: 4 MMOL/L — SIGNIFICANT CHANGE UP (ref 3.5–5.3)
PROT SERPL-MCNC: 7.4 G/DL — SIGNIFICANT CHANGE UP (ref 6–8.3)
PROTHROM AB SERPL-ACNC: 12.1 SEC — SIGNIFICANT CHANGE UP (ref 10.5–13.4)
RBC # BLD: 4.74 M/UL — SIGNIFICANT CHANGE UP (ref 3.8–5.2)
RBC # FLD: 13.6 % — SIGNIFICANT CHANGE UP (ref 10.3–14.5)
RSV RNA NPH QL NAA+NON-PROBE: SIGNIFICANT CHANGE UP
SARS-COV-2 RNA SPEC QL NAA+PROBE: SIGNIFICANT CHANGE UP
SODIUM SERPL-SCNC: 140 MMOL/L — SIGNIFICANT CHANGE UP (ref 135–145)
WBC # BLD: 9.28 K/UL — SIGNIFICANT CHANGE UP (ref 3.8–10.5)
WBC # FLD AUTO: 9.28 K/UL — SIGNIFICANT CHANGE UP (ref 3.8–10.5)

## 2023-03-22 PROCEDURE — 73110 X-RAY EXAM OF WRIST: CPT

## 2023-03-22 PROCEDURE — 85610 PROTHROMBIN TIME: CPT

## 2023-03-22 PROCEDURE — 72125 CT NECK SPINE W/O DYE: CPT | Mod: 26,MA

## 2023-03-22 PROCEDURE — 73130 X-RAY EXAM OF HAND: CPT

## 2023-03-22 PROCEDURE — 76376 3D RENDER W/INTRP POSTPROCES: CPT

## 2023-03-22 PROCEDURE — 70486 CT MAXILLOFACIAL W/O DYE: CPT | Mod: MA

## 2023-03-22 PROCEDURE — 72125 CT NECK SPINE W/O DYE: CPT | Mod: MA

## 2023-03-22 PROCEDURE — 73030 X-RAY EXAM OF SHOULDER: CPT

## 2023-03-22 PROCEDURE — 70450 CT HEAD/BRAIN W/O DYE: CPT | Mod: MA

## 2023-03-22 PROCEDURE — 73110 X-RAY EXAM OF WRIST: CPT | Mod: 26,LT

## 2023-03-22 PROCEDURE — 73090 X-RAY EXAM OF FOREARM: CPT

## 2023-03-22 PROCEDURE — 90715 TDAP VACCINE 7 YRS/> IM: CPT

## 2023-03-22 PROCEDURE — 73030 X-RAY EXAM OF SHOULDER: CPT | Mod: 26,LT

## 2023-03-22 PROCEDURE — 71045 X-RAY EXAM CHEST 1 VIEW: CPT | Mod: 26

## 2023-03-22 PROCEDURE — 36415 COLL VENOUS BLD VENIPUNCTURE: CPT

## 2023-03-22 PROCEDURE — 99285 EMERGENCY DEPT VISIT HI MDM: CPT | Mod: 25

## 2023-03-22 PROCEDURE — 93005 ELECTROCARDIOGRAM TRACING: CPT | Mod: XU

## 2023-03-22 PROCEDURE — 29125 APPL SHORT ARM SPLINT STATIC: CPT | Mod: LT

## 2023-03-22 PROCEDURE — 70486 CT MAXILLOFACIAL W/O DYE: CPT | Mod: 26,MA

## 2023-03-22 PROCEDURE — 80053 COMPREHEN METABOLIC PANEL: CPT

## 2023-03-22 PROCEDURE — 85730 THROMBOPLASTIN TIME PARTIAL: CPT

## 2023-03-22 PROCEDURE — 99284 EMERGENCY DEPT VISIT MOD MDM: CPT | Mod: 25

## 2023-03-22 PROCEDURE — 72170 X-RAY EXAM OF PELVIS: CPT | Mod: 26

## 2023-03-22 PROCEDURE — 90471 IMMUNIZATION ADMIN: CPT

## 2023-03-22 PROCEDURE — 73130 X-RAY EXAM OF HAND: CPT | Mod: 26,50

## 2023-03-22 PROCEDURE — 72170 X-RAY EXAM OF PELVIS: CPT

## 2023-03-22 PROCEDURE — 87637 SARSCOV2&INF A&B&RSV AMP PRB: CPT

## 2023-03-22 PROCEDURE — 71045 X-RAY EXAM CHEST 1 VIEW: CPT

## 2023-03-22 PROCEDURE — 76376 3D RENDER W/INTRP POSTPROCES: CPT | Mod: 26

## 2023-03-22 PROCEDURE — 70450 CT HEAD/BRAIN W/O DYE: CPT | Mod: 26,MA

## 2023-03-22 PROCEDURE — 85025 COMPLETE CBC W/AUTO DIFF WBC: CPT

## 2023-03-22 PROCEDURE — 73090 X-RAY EXAM OF FOREARM: CPT | Mod: 26,LT

## 2023-03-22 PROCEDURE — 29125 APPL SHORT ARM SPLINT STATIC: CPT

## 2023-03-22 RX ORDER — ACETAMINOPHEN 500 MG
975 TABLET ORAL ONCE
Refills: 0 | Status: COMPLETED | OUTPATIENT
Start: 2023-03-22 | End: 2023-03-22

## 2023-03-22 RX ORDER — TETANUS TOXOID, REDUCED DIPHTHERIA TOXOID AND ACELLULAR PERTUSSIS VACCINE, ADSORBED 5; 2.5; 8; 8; 2.5 [IU]/.5ML; [IU]/.5ML; UG/.5ML; UG/.5ML; UG/.5ML
0.5 SUSPENSION INTRAMUSCULAR ONCE
Refills: 0 | Status: COMPLETED | OUTPATIENT
Start: 2023-03-22 | End: 2023-03-22

## 2023-03-22 RX ORDER — ACETAMINOPHEN 500 MG
650 TABLET ORAL ONCE
Refills: 0 | Status: COMPLETED | OUTPATIENT
Start: 2023-03-22 | End: 2023-03-22

## 2023-03-22 RX ADMIN — Medication 650 MILLIGRAM(S): at 22:23

## 2023-03-22 RX ADMIN — TETANUS TOXOID, REDUCED DIPHTHERIA TOXOID AND ACELLULAR PERTUSSIS VACCINE, ADSORBED 0.5 MILLILITER(S): 5; 2.5; 8; 8; 2.5 SUSPENSION INTRAMUSCULAR at 16:26

## 2023-03-22 RX ADMIN — Medication 975 MILLIGRAM(S): at 22:34

## 2023-03-22 RX ADMIN — Medication 975 MILLIGRAM(S): at 16:27

## 2023-03-22 NOTE — ED ADULT NURSE NOTE - OBJECTIVE STATEMENT
Pt is a 82y F AxO4 presenting to ED after a fall yesterday. PMH HTN and HLD on Asprin (last dose Saturday), PSH L rotator cuff. Pt states she tripped on uneven concrete while walking and landed face first. Endorsing L wrist pain and L shoulder pain upon movement. Upon assessment, laceration and swelling to nose, periorbital swelling and bruising bilaterally, laceration to L forehead.  Denies headaches, dizziness, vision changes, N/V. Well appearing, resting comfortably in bed, speaking full sentences, maintained on cardiac monitor. No acute distress noted.

## 2023-03-22 NOTE — ED PROVIDER NOTE - NSICDXPASTMEDICALHX_GEN_ALL_CORE_FT
PAST MEDICAL HISTORY:  Anxiety     Cataract     [place] (hard of hearing) right ear     HTN (hypertension)     HTN (hypertension)     Obesity

## 2023-03-22 NOTE — ED PROVIDER NOTE - PROGRESS NOTE DETAILS
[facility] PGY-3: Patient reassessed, resting comfortably, discussed nonactionable CT imaging--patient eager to go home. Discussed supportive care instructions and return precautions. F/u with PMD. ap- pt informed of old stroke seen on ct, degenerative changes on the cervical spine, pt w/ pain in the L hand w/ signficiant swelling erica the 4/3rd metacarpal, pt w/ limited flexion/extension at the mcp joint and wrist on the L side, pt R hand dominant, plan for splint, pt informed of xray limited study due to arthritis, will give outpt orthopedic follow up, pt w/ cap refill elss than 2 seconds in all 5 fingers bilaterally, pt w/ intact DIP/PIP flexion and extension, pt w/ no snuffbox tenderness

## 2023-03-22 NOTE — ED ADULT NURSE NOTE - NSICDXPASTMEDICALHX_GEN_ALL_CORE_FT
PAST MEDICAL HISTORY:  Anxiety     Cataract     [place] (hard of hearing) right ear     HTN (hypertension)     HTN (hypertension)     Obesity

## 2023-03-22 NOTE — ED ADULT NURSE NOTE - NSIMPLEMENTINTERV_GEN_ALL_ED
Implemented All Fall Risk Interventions:  [place] to call system. Call bell, personal items and telephone within reach. Instruct patient to call for assistance. Room bathroom lighting operational. Non-slip footwear when patient is off stretcher. Physically safe environment: no spills, clutter or unnecessary equipment. Stretcher in lowest position, wheels locked, appropriate side rails in place. Provide visual cue, wrist band, yellow gown, etc. Monitor gait and stability. Monitor for mental status changes and reorient to person, place, and time. Review medications for side effects contributing to fall risk. Reinforce activity limits and safety measures with patient and family.

## 2023-03-22 NOTE — ED PROVIDER NOTE - ATTENDING CONTRIBUTION TO CARE
82-year-old female history HTN, HLD, anxiety, obesity, [place] Presenting with mechanical fall on the curb yesterday evening fell onto face with left wrist injury FOOSH with swelling and tenderness possible left wrist fracture patient says she takes baby aspirin but has not taken it in the last 3 to 4 days.  Presenting hypertensive ANO x3 GCS 15 nonfocal neuro exam with significant abrasions with with bilateral periorbital ecchymosis TMs were clear bilateral no neck pain CT imaging of the head was ordered to rule out skull fracture intracerebral hemorrhage but clinically patient is well-appearing tetanus Tylenol were given.  Patient is ambulating at baseline no lower extremity injuries except for an abrasion to her right toe wearing sandals.

## 2023-03-22 NOTE — ED PROVIDER NOTE - NEUROLOGICAL, MLM
Azathioprine Pregnancy And Lactation Text: This medication is Pregnancy Category D and isn't considered safe during pregnancy. It is unknown if this medication is excreted in breast milk. Alert and oriented, no focal deficits, no motor or sensory deficits.

## 2023-03-22 NOTE — ED ADULT NURSE NOTE - CHIEF COMPLAINT QUOTE
head/facial injury, raccoon eyes, left wrist inj, s/p mech fall yesterday, last ASA 81 mg on 3/18, no loc

## 2023-03-22 NOTE — ED ADULT NURSE NOTE - ISOLATION TYPE:
Pt is concerned with her thyroid levels  She said she was given results months ago back from feb  I did not see results in her chart, she also asked me about my chart issues nothing being updated since august 2019  I did tell her I did not see labs In her chart but she had them from [facility] in [place]  She is also questioning if anyone ever reviewed her labs from feb or gave her results from last august    Please look into and call patient back  None

## 2023-03-22 NOTE — ED PROVIDER NOTE - PATIENT PORTAL LINK FT
You can access the FollowMyHealth Patient Portal offered by [facility] by registering at the following website: [email or web address]. By joining [employer]’s FollowMyHealth portal, you will also be able to view your health information using other applications (apps) compatible with our system.

## 2023-03-22 NOTE — ED PROVIDER NOTE - OBJECTIVE STATEMENT
82-year-old female history HTN, HLD, anxiety, obesity, [place] presenting with fall.  Patient brought in accompanied by friend.  Patient reports mechanical trip and fall last night when leaving restaurant, tripped and fell onto both hands and hit her face.  Denies LOC.  Denies preceding symptoms.  Takes ASA 8 at 8 1 Mg preventative but did not take yesterday.  Tetanus status unknown.  Presents for facial swelling and left wrist swelling/pain.  Denies HA, N/V, unilateral numbness/tingling, CP, SOB, abdominal pain.

## 2023-03-22 NOTE — ED PROVIDER NOTE - NSFOLLOWUPINSTRUCTIONS_ED_ALL_ED_FT
You were seen in the Emergency Department for: facial injury after fall    YOU WERE FOUND TO HAVE INCIDENTAL FINDINGS ON YOUR CAT SCAN:  -chronic nasal bone fracture  -degenerative cervical spine disease  -chronic lacunar infarct    Please follow up with your primary physician regarding all of the incidental findings found on your CT scan--attached to this paperwork are all of your imaging results.     For pain/fever, you may take Tylenol (acetaminophen) 650 mg every 6 hours.    Please follow up with your primary physician. If you do not have a primary physician or specialist of your needs, please call [phone] to find one convenient for you. At this number you will be able to locate a provider who accepts your insurance, as well as locate the right specialist for your needs.    You should return to the Emergency Department if you feel any new/worsening/persistent symptoms including but not limited to: chest pain, difficulty breathing, loss of consciousness, bleeding, uncontrolled pain, numbness/weakness of a body part You were seen in the Emergency Department for: facial injury after fall    YOU WERE FOUND TO HAVE INCIDENTAL FINDINGS ON YOUR CAT SCAN:  -chronic nasal bone fracture  -degenerative cervical spine disease  -chronic lacunar infarct    Please follow up with your primary physician regarding all of the incidental findings found on your CT scan--attached to this paperwork are all of your imaging results.     For pain/fever, you may take Tylenol (acetaminophen) 650 mg every 6 hours.    Please follow up with your primary physician as well as an orthopedic surgeon. You were referred to our Discharge Lounge and someone will call you within 24-48 hours to help set up an appointment. If you are not contacted within that time, please call [phone] to find a provider who is convenient for you.    You should return to the Emergency Department if you feel any new/worsening/persistent symptoms including but not limited to: chest pain, difficulty breathing, loss of consciousness, bleeding, uncontrolled pain, numbness/weakness of a body part

## 2023-03-22 NOTE — ED PROVIDER NOTE - ENMT, MLM
Airway patent, Nasal mucosa clear. Mouth with normal mucosa. Throat has no vesicles, no oropharyngeal exudates and uvula is midline. No hemotympanum or septal hematoma. No adair sign

## 2023-04-04 ENCOUNTER — APPOINTMENT (OUTPATIENT)
Dept: UROGYNECOLOGY | Facility: CLINIC | Age: 83
End: 2023-04-04
Payer: MEDICARE

## 2023-04-04 VITALS
HEIGHT: 62 IN | SYSTOLIC BLOOD PRESSURE: 144 MMHG | WEIGHT: 200 LBS | DIASTOLIC BLOOD PRESSURE: 76 MMHG | HEART RATE: 87 BPM | BODY MASS INDEX: 36.8 KG/M2

## 2023-04-04 DIAGNOSIS — R39.15 URGENCY OF URINATION: ICD-10-CM

## 2023-04-04 PROCEDURE — 64566 NEUROELTRD STIM POST TIBIAL: CPT

## 2023-05-09 ENCOUNTER — APPOINTMENT (OUTPATIENT)
Dept: UROGYNECOLOGY | Facility: CLINIC | Age: 83
End: 2023-05-09
Payer: MEDICARE

## 2023-05-09 VITALS
WEIGHT: 200 LBS | HEIGHT: 62 IN | SYSTOLIC BLOOD PRESSURE: 140 MMHG | DIASTOLIC BLOOD PRESSURE: 73 MMHG | BODY MASS INDEX: 36.8 KG/M2

## 2023-05-09 PROCEDURE — 64566 NEUROELTRD STIM POST TIBIAL: CPT

## 2023-06-07 ENCOUNTER — APPOINTMENT (OUTPATIENT)
Dept: UROGYNECOLOGY | Facility: CLINIC | Age: 83
End: 2023-06-07
Payer: MEDICARE

## 2023-06-07 PROCEDURE — 64566 NEUROELTRD STIM POST TIBIAL: CPT

## 2023-07-05 ENCOUNTER — APPOINTMENT (OUTPATIENT)
Dept: UROGYNECOLOGY | Facility: CLINIC | Age: 83
End: 2023-07-05
Payer: MEDICARE

## 2023-07-05 DIAGNOSIS — N32.81 OVERACTIVE BLADDER: ICD-10-CM

## 2023-07-05 PROCEDURE — 64566 NEUROELTRD STIM POST TIBIAL: CPT

## 2023-09-07 ENCOUNTER — APPOINTMENT (OUTPATIENT)
Dept: UROGYNECOLOGY | Facility: CLINIC | Age: 83
End: 2023-09-07

## 2024-06-27 NOTE — ED ADULT NURSE NOTE - TEMPLATE LIST FOR HEAD TO TOE ASSESSMENT
ICC VISIT NOTE       Subjective   Patient Disposition:     Patient was seen here 3 days back with the foot laceration  Months for wound check.  He has been taking Keflex and Naprosyn as prescribed.  He is presently using crutches and has post op shoe.          MEDICATIONS:  Current Outpatient Medications   Medication Sig    lisdexamfetamine (VYVANSE) 60 MG capsule Take 60 mg by mouth every morning.    cephalexin (Keflex) 500 MG capsule Take 1 capsule by mouth in the morning and 1 capsule at noon and 1 capsule in the evening. Do all this for 10 days.    naproxen (NAPROSYN) 500 MG tablet Take 1 tablet by mouth in the morning and 1 tablet in the evening. Take with meals. Do all this for 10 days.     No current facility-administered medications for this visit.       ALLERGIES:  ALLERGIES:  No Known Allergies    PAST MEDICAL HISTORY:  Past Medical History:   Diagnosis Date    ADHD        PAST SURGICAL HISTORY:  History reviewed. No pertinent surgical history.    FAMILY HISTORY:  History reviewed. No pertinent family history.    SOCIAL HISTORY:  Social History     Tobacco Use    Smoking status: Never    Smokeless tobacco: Never   Vaping Use    Vaping status: never used         Patient's medications, allergies, past medical, surgical, and social history  were reviewed and updated as appropriate.      REVIEW OF SYSTEMS  Review of Systems   Constitutional:  Negative for chills and fever.   HENT:  Negative for congestion and sore throat.    Eyes:  Negative for visual disturbance.   Respiratory:  Negative for cough and shortness of breath.    Cardiovascular:  Negative for chest pain and leg swelling.   Gastrointestinal:  Negative for abdominal pain, diarrhea and vomiting.   Endocrine: Negative for polyuria.   Genitourinary:  Negative for dysuria.   Musculoskeletal:  Negative for back pain and joint swelling.        Laceration of the foot   Skin:  Negative for rash.   Neurological:  Negative for dizziness and headaches.    Hematological:  Does not bruise/bleed easily.       Vitals:    06/27/24 1001   BP: 119/74   Pulse: (!) 120   Resp: 16   Temp: 98.2 °F (36.8 °C)   SpO2: 100%   PainSc:  0        POINT OF CARE TEST RESULTS  No results found for this visit on 06/27/24.    No results found for any visits on 06/27/24 (from the past 48 hour(s)).     Objective     Physical Exam  Constitutional:       Appearance: Normal appearance.   Musculoskeletal:      Comments: Left foot:  Acute wound on the plantar side as well as on the dorsal side of the foot.  Wounds are healing well.  There is no sign of infection.  Neurovascular status   Neurological:      Mental Status: He is alert.         Procedures dressing was removed, wound was redressed with bacitracin ointment.    MEDICAL DECISION MAKING:  I have seen the patient    Patient will be discharged with prescription for Bactroban patient should continue taking his past prescriptions  The stitches can come out in about 1 week's time/  Patient was given 1 week off from work.    Assessment   Problem List Items Addressed This Visit    None  Visit Diagnoses       Visit for wound check    -  Primary            FOLLOW UP  Please follow up with your PCP Pcp, No or proceed to ER if symptoms persist or worsen.      Instructions provided as documented in the AVS.     Fall

## 2024-07-31 NOTE — ED PROVIDER NOTE - DISCUSSED CLINICAL AND RADIOLOGICAL FINDINGS WITH, MDM
This is an 85-year-old male AAOx3, walks independently, that is [place] speaking.  He has a PMHx of pre-diabetes, hypertension, hyperlipidemia, CVA on Xarelto, and gout.  He presented to the emergency room complaining of multiple bloody, liquid bowel movements for 24 hours prior to admission.  The patients granddaughter was at bedside and had photos of BMs and the toilet bowl, which appeared full with dark red blood and loose stool. They stated that in the morning prior to admission on 7/30, the patient had a bowel movement and noticed the toilet bowl filled with blood, he denied any pain at that time but stated he has similar symptoms previously. He also denied any straining with moving bowels, chest pain, shortness of breath, abdominal pain, weakness, or dizziness. In ED, pt reports 6-8 additional, similar BMs. Pt underwent colonoscopy once after he turned 49 and does not remember any abnormal findings at that time.    On admission a CT of the Abd/ Pelvis was done and he was found to have colonic diverticulosis.  There was a high density material noted layering within the descending colon, this was concerning for an active bleed, but the scan performed was not a CTA.      IR has been consulted for embolization    PAST MEDICAL & SURGICAL HISTORY:  Gout  Hypertension  Hyperlipidemia  CVA (cerebrovascular accident)    Home Medications:  atorvastatin 20 mg oral tablet: 1 tab(s) orally once a day (at bedtime) (31 Jul 2024 10:58)  colchicine 0.6 mg oral tablet: 1 tab(s) orally once a day as needed for  gout pain (31 Jul 2024 11:04)  metoprolol tartrate 50 mg oral tablet: 1 tab(s) orally once a day (31 Jul 2024 02:56)  pantoprazole 40 mg oral delayed release tablet: 1 tab(s) orally once a day (31 Jul 2024 02:56)  tamsulosin 0.4 mg oral capsule: 1 cap(s) orally once a day (at bedtime) (31 Jul 2024 11:01)  valsartan-hydrochlorothiazide 160 mg-12.5 mg oral tablet: 1 tab(s) orally once a day (31 Jul 2024 11:04)  Xarelto 15 mg oral tablet: 1 tab(s) orally once a day (31 Jul 2024 11:04)    MEDICATIONS  (STANDING):  atorvastatin 40 milliGRAM(s) Oral at bedtime  colchicine 0.6 milliGRAM(s) Oral daily  insulin lispro (ADMELOG) corrective regimen sliding scale   SubCutaneous three times a day before meals  insulin lispro (ADMELOG) corrective regimen sliding scale   SubCutaneous at bedtime  pantoprazole  Injectable 40 milliGRAM(s) IV Push two times a day  tamsulosin 0.4 milliGRAM(s) Oral at bedtime    MEDICATIONS  (PRN):  acetaminophen     Tablet .. 650 milliGRAM(s) Oral every 6 hours PRN Temp greater or equal to 38C (100.4F), Mild Pain   aluminum hydroxide/magnesium hydroxide/simethicone Suspension 30 milliLiter(s) Oral every 4 hours PRN Dyspepsia  melatonin 3 milliGRAM(s) Oral at bedtime PRN Insomnia  ondansetron Injectable 4 milliGRAM(s) IV Push every 8 hours PRN Nausea and/or Vomiting    Allergies  No Known Allergies      Vital Signs Last 24 Hrs  T(C): 36.5 (31 Jul 2024 05:37), Max: 37 (30 Jul 2024 14:29)  T(F): 97.7 (31 Jul 2024 05:37), Max: 98.6 (30 Jul 2024 14:29)  HR: 61 (31 Jul 2024 05:37) (42 - 68)  BP: 126/50 (31 Jul 2024 05:37) (126/50 - 165/75)  BP(mean): 68 (31 Jul 2024 05:37) (68 - 87)  RR: 18 (31 Jul 2024 05:37) (14 - 18)  SpO2: 99% (31 Jul 2024 05:37) (98% - 100%)    Parameters below as of 30 Jul 2024 23:11  Patient On (Oxygen Delivery Method): room air                          9.7    7.11  )-----------( 141      ( 31 Jul 2024 06:10 )             30.6   07-31    143  |  108  |  21<H>  ----------------------------<  102<H>  3.6   |  29  |  1.11    Ca    8.6      31 Jul 2024 06:10  Phos  3.6     07-31  Mg     1.9     07-31    TPro  5.9<L>  /  Alb  2.9<L>  /  TBili  1.3<H>  /  DBili  x   /  AST  33  /  ALT  28  /  AlkPhos  79  07-31    PT/INR - ( 31 Jul 2024 06:10 )   PT: 20.4 sec;   INR: 1.82 ratio    PTT - ( 31 Jul 2024 06:10 )  PTT:32.2 sec      CT Abdomen and Pelvis w/ IV Cont (07.30.24 @ 17:59) >  FINDINGS:  LOWER CHEST: Elevation of the left hemidiaphragm.    LIVER: Within normal limits.  BILE DUCTS: Normal caliber.  GALLBLADDER: Within normal limits.  SPLEEN: Within normal limits.  PANCREAS: Within normal limits.  ADRENALS: Within normal limits.  KIDNEYS/URETERS: Bilateral renal cysts. Additional left subcentimeter   hypodensities, too small to characterize. No hydronephrosis.    BLADDER: Minimally distended.  REPRODUCTIVE ORGANS: Prostate within normal limits.    BOWEL: High density layering within the descending colon. No bowel   obstruction. Colonic diverticulosis. Appendix is normal.  PERITONEUM/RETROPERITONEUM: Within normal limits.  VESSELS: Atherosclerotic changes.  LYMPH NODES: No lymphadenopathy.  ABDOMINAL WALL: Withinnormal limits.  BONES: Degenerative changes.    IMPRESSION:    High density material layering within the descending colon, concerning   for active bleeding.    Colonic diverticulosis.    Critical findings were discussed with Dr. Da Silva on 7/30/2024 6:51 PM.    --- End of Report ---    < end of copied text >             History obtained from chart:  This is an 85-year-old male AAOx3, walks independently, that is [place] speaking.  He has a PMHx of pre-diabetes, hypertension, hyperlipidemia, CVA on Xarelto, and gout. He presented to the emergency room complaining of multiple bloody, liquid bowel movements for 24 hours prior to admission.  The patients granddaughter was at bedside and had photos of BMs and the toilet bowl, which appeared full with dark red blood and loose stool. They stated that in the morning prior to admission on 7/30, the patient had a bowel movement and noticed the toilet bowl filled with blood, he denied any pain at that time but stated he has similar symptoms previously. He also denied any straining with moving bowels, chest pain, shortness of breath, abdominal pain, weakness, or dizziness. In ED, pt reports 6-8 additional, similar BMs. Pt underwent colonoscopy once after he turned 49 and does not remember any abnormal findings at that time.    On admission a CT of the Abd/ Pelvis was done and he was found to have colonic diverticulosis.  There was a high density material noted layering within the descending colon, concerning for an active bleed, but the scan performed was not a CTA protocol.      IR has been consulted for embolization.    PAST MEDICAL & SURGICAL HISTORY:  Gout  Hypertension  Hyperlipidemia  CVA (cerebrovascular accident)    Home Medications:  atorvastatin 20 mg oral tablet: 1 tab(s) orally once a day (at bedtime) (31 Jul 2024 10:58)  colchicine 0.6 mg oral tablet: 1 tab(s) orally once a day as needed for  gout pain (31 Jul 2024 11:04)  metoprolol tartrate 50 mg oral tablet: 1 tab(s) orally once a day (31 Jul 2024 02:56)  pantoprazole 40 mg oral delayed release tablet: 1 tab(s) orally once a day (31 Jul 2024 02:56)  tamsulosin 0.4 mg oral capsule: 1 cap(s) orally once a day (at bedtime) (31 Jul 2024 11:01)  valsartan-hydrochlorothiazide 160 mg-12.5 mg oral tablet: 1 tab(s) orally once a day (31 Jul 2024 11:04)  Xarelto 15 mg oral tablet: 1 tab(s) orally once a day (31 Jul 2024 11:04)    MEDICATIONS  (STANDING):  atorvastatin 40 milliGRAM(s) Oral at bedtime  colchicine 0.6 milliGRAM(s) Oral daily  insulin lispro (ADMELOG) corrective regimen sliding scale   SubCutaneous three times a day before meals  insulin lispro (ADMELOG) corrective regimen sliding scale   SubCutaneous at bedtime  pantoprazole  Injectable 40 milliGRAM(s) IV Push two times a day  tamsulosin 0.4 milliGRAM(s) Oral at bedtime    MEDICATIONS  (PRN):  acetaminophen     Tablet .. 650 milliGRAM(s) Oral every 6 hours PRN Temp greater or equal to 38C (100.4F), Mild Pain   aluminum hydroxide/magnesium hydroxide/simethicone Suspension 30 milliLiter(s) Oral every 4 hours PRN Dyspepsia  melatonin 3 milliGRAM(s) Oral at bedtime PRN Insomnia  ondansetron Injectable 4 milliGRAM(s) IV Push every 8 hours PRN Nausea and/or Vomiting    Allergies  No Known Allergies      Vital Signs Last 24 Hrs  T(C): 36.5 (31 Jul 2024 05:37), Max: 37 (30 Jul 2024 14:29)  T(F): 97.7 (31 Jul 2024 05:37), Max: 98.6 (30 Jul 2024 14:29)  HR: 61 (31 Jul 2024 05:37) (42 - 68)  BP: 126/50 (31 Jul 2024 05:37) (126/50 - 165/75)  BP(mean): 68 (31 Jul 2024 05:37) (68 - 87)  RR: 18 (31 Jul 2024 05:37) (14 - 18)  SpO2: 99% (31 Jul 2024 05:37) (98% - 100%)    Parameters below as of 30 Jul 2024 23:11  Patient On (Oxygen Delivery Method): room air                          9.7    7.11  )-----------( 141      ( 31 Jul 2024 06:10 )             30.6   07-31    143  |  108  |  21<H>  ----------------------------<  102<H>  3.6   |  29  |  1.11    Ca    8.6      31 Jul 2024 06:10  Phos  3.6     07-31  Mg     1.9     07-31    TPro  5.9<L>  /  Alb  2.9<L>  /  TBili  1.3<H>  /  DBili  x   /  AST  33  /  ALT  28  /  AlkPhos  79  07-31    PT/INR - ( 31 Jul 2024 06:10 )   PT: 20.4 sec;   INR: 1.82 ratio    PTT - ( 31 Jul 2024 06:10 )  PTT:32.2 sec      CT Abdomen and Pelvis w/ IV Cont (07.30.24 @ 17:59) >  FINDINGS:  LOWER CHEST: Elevation of the left hemidiaphragm.    LIVER: Within normal limits.  BILE DUCTS: Normal caliber.  GALLBLADDER: Within normal limits.  SPLEEN: Within normal limits.  PANCREAS: Within normal limits.  ADRENALS: Within normal limits.  KIDNEYS/URETERS: Bilateral renal cysts. Additional left subcentimeter   hypodensities, too small to characterize. No hydronephrosis.    BLADDER: Minimally distended.  REPRODUCTIVE ORGANS: Prostate within normal limits.    BOWEL: High density layering within the descending colon. No bowel   obstruction. Colonic diverticulosis. Appendix is normal.  PERITONEUM/RETROPERITONEUM: Within normal limits.  VESSELS: Atherosclerotic changes.  LYMPH NODES: No lymphadenopathy.  ABDOMINAL WALL: Withinnormal limits.  BONES: Degenerative changes.    IMPRESSION:    High density material layering within the descending colon, concerning   for active bleeding.    Colonic diverticulosis.    Critical findings were discussed with Dr. Da Silva on 7/30/2024 6:51 PM.    --- End of Report ---    < end of copied text >             family/patient

## 2024-09-20 NOTE — ED ADULT NURSE NOTE - STRIKING AGAINST
Dear Kasandra,   Your test results are all back -   -TSH (thyroid stimulating hormone) level is normal which indicates appropriate thyroid replacement dosing.  ADVISE: continuing same replacement dose and rechecking this in 12 months.  Let us know if you have any questions.  -Trish Callaway, DO   [place]

## 2024-11-04 NOTE — ED POST DISCHARGE NOTE - DETAILS
pt admitted, subsequent cxr ordered which was read by radiology as clear. -Antonia Barrera PA-C Admission

## 2025-01-01 ENCOUNTER — INPATIENT (INPATIENT)
Facility: HOSPITAL | Age: 85
LOS: 5 days | Discharge: NURSING, REHAB OR LONG-TERM CARE | DRG: 538 | End: 2025-01-07
Attending: INTERNAL MEDICINE | Admitting: INTERNAL MEDICINE
Payer: MEDICARE

## 2025-01-01 VITALS
HEART RATE: 86 BPM | RESPIRATION RATE: 17 BRPM | DIASTOLIC BLOOD PRESSURE: 95 MMHG | OXYGEN SATURATION: 95 % | HEIGHT: 62 IN | SYSTOLIC BLOOD PRESSURE: 208 MMHG | TEMPERATURE: 98 F | WEIGHT: 199.96 LBS

## 2025-01-01 DIAGNOSIS — Z96.651 PRESENCE OF RIGHT ARTIFICIAL KNEE JOINT: Chronic | ICD-10-CM

## 2025-01-01 LAB
ALBUMIN SERPL ELPH-MCNC: 4.7 G/DL — SIGNIFICANT CHANGE UP (ref 3.3–5)
ALP SERPL-CCNC: 90 U/L — SIGNIFICANT CHANGE UP (ref 40–120)
ALT FLD-CCNC: 16 U/L — SIGNIFICANT CHANGE UP (ref 10–45)
ANION GAP SERPL CALC-SCNC: 14 MMOL/L — SIGNIFICANT CHANGE UP (ref 5–17)
APTT BLD: 29.9 SEC — SIGNIFICANT CHANGE UP (ref 24.5–35.6)
AST SERPL-CCNC: 24 U/L — SIGNIFICANT CHANGE UP (ref 10–40)
BASOPHILS # BLD AUTO: 0.04 K/UL — SIGNIFICANT CHANGE UP (ref 0–0.2)
BASOPHILS NFR BLD AUTO: 0.6 % — SIGNIFICANT CHANGE UP (ref 0–2)
BILIRUB SERPL-MCNC: 0.4 MG/DL — SIGNIFICANT CHANGE UP (ref 0.2–1.2)
BUN SERPL-MCNC: 18 MG/DL — SIGNIFICANT CHANGE UP (ref 7–23)
CALCIUM SERPL-MCNC: 10.1 MG/DL — SIGNIFICANT CHANGE UP (ref 8.4–10.5)
CHLORIDE SERPL-SCNC: 103 MMOL/L — SIGNIFICANT CHANGE UP (ref 96–108)
CO2 SERPL-SCNC: 25 MMOL/L — SIGNIFICANT CHANGE UP (ref 22–31)
CREAT SERPL-MCNC: 0.65 MG/DL — SIGNIFICANT CHANGE UP (ref 0.5–1.3)
EGFR: 86 ML/MIN/1.73M2 — SIGNIFICANT CHANGE UP
EOSINOPHIL # BLD AUTO: 0.17 K/UL — SIGNIFICANT CHANGE UP (ref 0–0.5)
EOSINOPHIL NFR BLD AUTO: 2.7 % — SIGNIFICANT CHANGE UP (ref 0–6)
GLUCOSE SERPL-MCNC: 98 MG/DL — SIGNIFICANT CHANGE UP (ref 70–99)
HCT VFR BLD CALC: 45 % — SIGNIFICANT CHANGE UP (ref 34.5–45)
HGB BLD-MCNC: 14.5 G/DL — SIGNIFICANT CHANGE UP (ref 11.5–15.5)
IMM GRANULOCYTES NFR BLD AUTO: 0.2 % — SIGNIFICANT CHANGE UP (ref 0–0.9)
INR BLD: 1.01 RATIO — SIGNIFICANT CHANGE UP (ref 0.85–1.16)
LYMPHOCYTES # BLD AUTO: 1.43 K/UL — SIGNIFICANT CHANGE UP (ref 1–3.3)
LYMPHOCYTES # BLD AUTO: 22.6 % — SIGNIFICANT CHANGE UP (ref 13–44)
MCHC RBC-ENTMCNC: 28.8 PG — SIGNIFICANT CHANGE UP (ref 27–34)
MCHC RBC-ENTMCNC: 32.2 G/DL — SIGNIFICANT CHANGE UP (ref 32–36)
MCV RBC AUTO: 89.5 FL — SIGNIFICANT CHANGE UP (ref 80–100)
MONOCYTES # BLD AUTO: 0.66 K/UL — SIGNIFICANT CHANGE UP (ref 0–0.9)
MONOCYTES NFR BLD AUTO: 10.4 % — SIGNIFICANT CHANGE UP (ref 2–14)
NEUTROPHILS # BLD AUTO: 4.01 K/UL — SIGNIFICANT CHANGE UP (ref 1.8–7.4)
NEUTROPHILS NFR BLD AUTO: 63.5 % — SIGNIFICANT CHANGE UP (ref 43–77)
NRBC # BLD: 0 /100 WBCS — SIGNIFICANT CHANGE UP (ref 0–0)
PLATELET # BLD AUTO: 304 K/UL — SIGNIFICANT CHANGE UP (ref 150–400)
POTASSIUM SERPL-MCNC: 3.7 MMOL/L — SIGNIFICANT CHANGE UP (ref 3.5–5.3)
POTASSIUM SERPL-SCNC: 3.7 MMOL/L — SIGNIFICANT CHANGE UP (ref 3.5–5.3)
PROT SERPL-MCNC: 8.1 G/DL — SIGNIFICANT CHANGE UP (ref 6–8.3)
PROTHROM AB SERPL-ACNC: 11.6 SEC — SIGNIFICANT CHANGE UP (ref 9.9–13.4)
RBC # BLD: 5.03 M/UL — SIGNIFICANT CHANGE UP (ref 3.8–5.2)
RBC # FLD: 14 % — SIGNIFICANT CHANGE UP (ref 10.3–14.5)
SODIUM SERPL-SCNC: 142 MMOL/L — SIGNIFICANT CHANGE UP (ref 135–145)
WBC # BLD: 6.32 K/UL — SIGNIFICANT CHANGE UP (ref 3.8–10.5)
WBC # FLD AUTO: 6.32 K/UL — SIGNIFICANT CHANGE UP (ref 3.8–10.5)

## 2025-01-01 PROCEDURE — 99285 EMERGENCY DEPT VISIT HI MDM: CPT

## 2025-01-01 PROCEDURE — 76377 3D RENDER W/INTRP POSTPROCES: CPT | Mod: 26

## 2025-01-01 PROCEDURE — 72192 CT PELVIS W/O DYE: CPT | Mod: 26,MC

## 2025-01-01 RX ORDER — IBUPROFEN 200 MG
400 TABLET ORAL ONCE
Refills: 0 | Status: COMPLETED | OUTPATIENT
Start: 2025-01-01 | End: 2025-01-01

## 2025-01-01 RX ORDER — LIDOCAINE 50 MG/G
1 OINTMENT TOPICAL ONCE
Refills: 0 | Status: COMPLETED | OUTPATIENT
Start: 2025-01-01 | End: 2025-01-01

## 2025-01-01 RX ADMIN — Medication 400 MILLIGRAM(S): at 17:16

## 2025-01-01 RX ADMIN — Medication 400 MILLIGRAM(S): at 16:00

## 2025-01-01 RX ADMIN — LIDOCAINE 1 PATCH: 50 OINTMENT TOPICAL at 16:00

## 2025-01-01 RX ADMIN — Medication 5 MILLIGRAM(S): at 16:00

## 2025-01-01 NOTE — ED PROVIDER NOTE - OBJECTIVE STATEMENT
84F hx htn, obesity, anxiety here with coccyx, L buttock pain and L back of thigh pain since a fall 1 week ago. pt states she was walking at [employer] last week when she lost her footing and fell onto her buttock. states no LOC or headstrike. states since then experiencing symptoms to above areas when she is moving - such as getting out of bed or sitting up. she has been walking. no back pain, extremity weakness or numbness, chest pain, sob, headache, vision changes, abdominal pain, difficulties urinating. states she has not taken her antihypertensives for several months because she no longer has a PCP. she does not have her phone and is not sure what meds she is supposed to take,

## 2025-01-01 NOTE — ED ADULT NURSE NOTE - NURSING NEURO ORIENTATION
Spoke to patient  Patient is aware of below results and recommendations made by provider.  Patient verbalized understanding and has no further questions at this time.      oriented to person, place and time

## 2025-01-01 NOTE — ED ADULT NURSE NOTE - OBJECTIVE STATEMENT
Received pt via EMS from urgent Care. Pt states " continued pain to left buttock that goes down back of left thigh since fall 2 weeks ago onto tile floor in the  basement of [place]. I did not head my head, no LOC. No numbness/tingling. Last took Tylenol this am with minimal relief"  per EMS report- negative xrays at urgent care Received pt via EMS from urgent Care. Pt states " continued pain to left buttock that goes down back of left thigh since fall 2 weeks ago onto tile floor in the  basement of [place]. I did not head my head, no LOC. No numbness/tingling. Last took Tylenol this am with minimal relief. My blood pressure has been high as I ran out of my medications a few months ago and no longer have a doctor to prescribe them."  per EMS report- negative xrays at urgent care

## 2025-01-01 NOTE — ED ADULT TRIAGE NOTE - AS TEMP SITE
----- Message from Corey Sr sent at 8/13/2024 11:26 AM CDT -----  Ct results-keep f/u with Dr. Duran   oral

## 2025-01-01 NOTE — ED ADULT NURSE NOTE - NSICDXPASTMEDICALHX_GEN_ALL_CORE_FT
PAST MEDICAL HISTORY:  Anxiety     Cataract     [place] (hard of hearing) right ear     HTN (hypertension)     HTN (hypertension)     Obesity

## 2025-01-01 NOTE — ED ADULT NURSE REASSESSMENT NOTE - CONDITION
EMERGENCY DEPARTMENT ENCOUNTER      CHIEF COMPLAINT    Chief Complaint   Patient presents with   • Medical Screening Exam       HPI    Reena Brown is a 78 year old female who presents to the emergency department for concerns for a facial droop which was reportedly witnessed by staff at the [facility].  Patient has a history of prior CVA multiple sclerosis, Alzheimer's disease, dementia and is currently being treated for a urinary tract infection.  On arrival here in the emergency department the patient is somnolent however awakes to verbal stimuli, patient has a symmetrical face.  Patient denies any symptoms.  Patient denies any headache.  Patient denies any chest pain.  Patient denies any abdominal pain patient denies any nausea vomiting.  Patient was recently started on Cipro.    ALLERGIES    ALLERGIES:  No Known Allergies    CURRENT MEDICATIONS    Current Facility-Administered Medications   Medication Dose Route Frequency Provider Last Rate Last Admin   • dextrose 50 % injection 25 g  25 g Intravenous PRN Jax Atwood, DO       • glucagon (GLUCAGEN) injection 1 mg  1 mg Intramuscular PRN Jax Atwood, DO       • dextrose (GLUTOSE) 40 % gel 15 g  15 g Oral PRN Jax Atwood, DO       • sodium chloride 0.9 % flush bag 25 mL  25 mL Intravenous PRN Jax Atwood, DO       • sodium chloride (PF) 0.9 % injection 2 mL  2 mL Intracatheter 2 times per day Jax Atwood DO         Current Outpatient Medications   Medication Sig Dispense Refill   • ciprofloxacin (Cipro) 250 MG tablet Take 1 tablet by mouth daily for 7 days. 7 tablet 0   • furosemide (LASIX) 40 MG tablet TAKE ONE TABLET BY MOUTH ONCE DAILY 18 tablet 11   • losartan (COZAAR) 50 MG tablet TAKE ONE TABLET BY MOUTH ONCE DAILY 18 tablet 11   • risperiDONE (RisperDAL) 2 MG tablet Take 1 tablet by mouth nightly. 30 tablet 3   • Robafen 100 MG/5ML syrup Take 10 mLs by mouth every 4 hours as needed (to loosen chest  unchanged congestion.).  99   • LORazepam (ATIVAN) 1 MG tablet Take 1 tablet by mouth every 6 hours as needed for Anxiety. 15 tablet 0   • haloperidol (HALDOL) 0.5 MG tablet Take 1 tablet by mouth 3 times daily as needed (agitation). 15 tablet 0   • acetaminophen (TYLENOL) 325 MG tablet Take 650 mg by mouth every 4 hours as needed for Pain or Fever.     • nystatin (MYCOSTATIN) 199275 UNIT/GM cream Apply topically 3 times daily as needed (rash in inner folds).     • triamcinolone (KENALOG) 0.025 % ointment Apply topically 2 times daily. 30 g 0   • metoPROLOL succinate (TOPROL-XL) 25 MG 24 hr tablet Take 1 tablet by mouth daily. 30 tablet 11   • oxcarbazepine (TRILEPTAL) 600 MG tablet GIVE ONE TABLET BY MOUTH TWO TIMES A DAY FOR TRIGEMINAL NEURALGIA 60 tablet 3   • docusate sodium (DOK) 100 MG capsule TAKE ONE CAPSULE BY MOUTH TWICE DAILY 60 capsule 11   • escitalopram (LEXAPRO) 10 MG tablet TAKE ONE TABLET BY MOUTH EVERY DAY FOR ANXIETY/DEPRESSION 90 tablet 1   • Zinc Gluconate 50 MG Cap GIVE 1 CAPLET BY MOUTH ONCE DAILY 90 capsule 1   • Aspirin Low Dose 81 MG chewable tablet TAKE ONE TABLET BY MOUTH TWICE DAILY FOR PREVENTION 60 tablet 11   • simvastatin (ZOCOR) 10 MG tablet TAKE ONE TABLET BY MOUTH EVERY DAY IN THE EVENING FOR HYPERLIPIDEMIA 90 tablet 3   • loperamide (IMODIUM A-D) 2 MG tablet TAKE 1 TABLET AFTER FIRST SIGN OF DIARRHEA AND AGAIN AFTER EACH BOWEL MOVEMENT **MAXIMUM OF 4 TABLETS IN 24 HOURS** 15 tablet 3   • Mi-Acid Gas Relief 80 MG chewable tablet TAKE 2 TABLETS (160MG) BY MOUTH FOR INDIGESTION BETWEEN MEALS OR AT BEDTIME AS NEEDED 60 tablet 11   • nystatin (MYCOSTATIN) 222069 UNIT/GM powder Apply topically 3 times daily as needed (rash). 30 g 11   • carbamide peroxide ([facility] EARWAX REMOVAL KIT) 6.5 % otic solution Instill 3 drops into affected ear to soften and remove earwax twice daily as needed for 3 days then irrigate     • MILK OF MAGNESIA 400 MG/5ML suspension TAKE 2 TABLESPOONSFUL (30ML) BY MOUTH DAILY  AS NEEDED FOR MILD CONSTIPATION 473 mL 11   • BISCOLAX 10 MG suppository Place 10 mg rectally daily as needed (more severe constipation).   0   • ARTIFICIAL TEARS 0.2-0.2-1 % Solution Place 1-2 drops into both eyes as needed (dry eye). Indications: Disorder of Excessive Cornea and Conjunctiva Dryness   99   • BIOFREEZE 4 % Gel APPLY TO BOTH KNEES TOPICALLY EVERY 6 HOURS AS NEEDED FOR MUSCLE OR JOINT PAIN  0   • neomycin-polymyxin B-bacitracin (TRIPLE ANTIBIOTIC) 5-400-5000 ointment Apply a small amount topically 1-3 times daily as needed for minor abrasions  99   • sodium biphosphate (FLEET) 7-19 GM/118ML enema Place 1 enema rectally daily as needed (extreme constipation).   0   • DESITIN 13 % cream APPLIED TO IRRITATED SKIN/RASH 3 TO 4 TIMES DAILY AS NEEDED -- ALSO USED FOR PREVENTION  99   • lidocaine 3 % cream Apply Topically to left temple/tmj area every 8 hours as needed for trigeminal neuralgia pain 28 g 0       PAST MEDICAL HISTORY    Past Medical History:   Diagnosis Date   • Dementia due to Alzheimer's disease (CMS/HCC)    • Dysfunctional uterine bleeding    • Essential hypertension, benign    • Fibula fracture 08/2019    right   • Hyperlipidemia    • Hyponatremia    • MS (multiple sclerosis) (CMS/HCC)     untreated   • Occipital infarction (CMS/HCC)    • Trigeminal neuralgia    • UTI (urinary tract infection)        SURGICAL HISTORY    Past Surgical History:   Procedure Laterality Date   • Appendectomy     • Craniectomy Right     occipital, with decompression of trigeminal nerve   • Orif wrist fracture Left     secondary to fracture   • Total abdominal hysterectomy w/ bilateral salpingoophorectomy     • Total knee arthroplasty Left    • Total knee arthroplasty Right        SOCIAL HISTORY    Social History     Tobacco Use   • Smoking status: Former Smoker     Packs/day: 0.00   • Smokeless tobacco: Never Used   Substance Use Topics   • Alcohol use: Not Currently   • Drug use: Never       FAMILY HISTORY     History reviewed. No pertinent family history.    REVIEW OF SYSTEMS    Unable to obtain secondary to patient's history of dementia    PHYSICAL EXAM    ED Triage Vitals [07/25/21 0153]   BP (!) 170/79   Heart Rate 70   Resp 14   Temp 98.1 °F (36.7 °C)   SpO2 96 %       Constitutional:  Lying supine in stretcher.  Awakes to verbal stimuli  Eyes:  PERRL, conjunctivae normal.   HENT:  Atraumatic. External ears normal. Nose normal.  Dry mucous membranes  Neck: Normal range of motion. No tenderness. Supple.   Respiratory:  No respiratory distress, normal breath sounds. No rales. No wheezing.   Cardiovascular:  Normal rate, normal rhythm. No murmurs, gallops, or rubs.  GI:  Soft, nondistended. Normal bowel sounds, nontender. No hepatomegaly, splenomegaly, mass, rebound or guarding.   Musculoskeletal:  No edema, tenderness, or deformities.  Integument:  Well hydrated, no rash.   Lymphatic:  No lymphadenopathy noted.   Neurologic:  Awakes to verbal, no facial asymmetry.  Moving all extremities spontaneously      EKG  atrial fibrillation with a ventricular rate of 70 beats per minute, of QRS is 86 QTC is 427 T-wave inversion in V1 through V6     HEART Score Total : 5       RADIOLOGY    CT HEAD WO CONTRAST   Final Result   IMPRESSION:    1. No acute intracranial hemorrhage, hydrocephalus, or CT evidence of acute   infarct. Stable mild chronic microvascular ischemic disease and chronic   right occipital lobe encephalomalacia.     2. Right occipital craniotomy, unchanged.                  XR Chest AP or PA   Final Result   IMPRESSION:    1. Scattered interstitial opacities in the left mid to lower lung field,   probably scarring.   2. Stable cardiomegaly.                LABS    Results for orders placed or performed during the hospital encounter of 07/25/21   Magnesium   Result Value    Magnesium 1.7   Troponin I Ultra Sensitive   Result Value    Troponin I, Ultra Sensitive <0.02   Urinalysis & Reflex Microscopy With Culture  If Indicated   Result Value    COLOR, URINALYSIS Yellow    APPEARANCE, URINALYSIS Hazy    GLUCOSE, URINALYSIS Negative    BILIRUBIN, URINALYSIS Negative    KETONES, URINALYSIS Negative    SPECIFIC GRAVITY, URINALYSIS 1.018    OCCULT BLOOD, URINALYSIS Negative    PH, URINALYSIS 5.0    PROTEIN, URINALYSIS Negative    UROBILINOGEN, URINALYSIS 0.2    NITRITE, URINALYSIS Negative    LEUKOCYTE ESTERASE, URINALYSIS Large (A)    SQUAMOUS EPITHELIAL, URINALYSIS 1 to 5    ERYTHROCYTES, URINALYSIS 3 to 5 (A)    LEUKOCYTES, URINALYSIS >100 (A)    BACTERIA, URINALYSIS Few (A)    HYALINE CASTS, URINALYSIS 1 to 5    MUCUS Present   Comprehensive Metabolic Panel   Result Value    Fasting Status     Sodium 148 (H)    Potassium 3.2 (L)    Chloride 108 (H)    Carbon Dioxide 32    Anion Gap 11    Glucose 127 (H)    BUN 18    Creatinine 0.73    Glomerular Filtration Rate 79 (L)     Comment: eGFR 60 - 89 mL/min/1.73m2 = Mild decrease in kidney function.    BUN/ Creatinine Ratio 25    Calcium 8.7    Bilirubin, Total 0.2    GOT/AST 16    GPT/ALT 24    Alkaline Phosphatase 130 (H)    Albumin 3.0 (L)    Protein, Total 6.6    Globulin 3.6    A/G Ratio 0.8 (L)   Lipase   Result Value    Lipase 159   CBC with Automated Differential (performable only)   Result Value    WBC 7.0    RBC 4.28    HGB 11.3 (L)    HCT 36.9    MCV 86.2    MCH 26.4    MCHC 30.6 (L)    RDW-CV 15.1 (H)    RDW-SD 47.2        NRBC 0    Neutrophil, Percent 65    Lymphocytes, Percent 18    Mono, Percent 12    Eosinophils, Percent 4    Basophils, Percent 1    Immature Granulocytes 0    Absolute Neutrophils 4.6    Absolute Lymphocytes 1.3    Absolute Monocytes 0.8    Absolute Eosinophils  0.3    Absolute Basophils 0.1    Absolute Immmature Granulocytes 0.0   ISTAT CG8, VENOUS     +POC ONLY   Result Value    PH, VENOUS - POINT OF CARE 7.39    PCO2, VENOUS - POINT OF CARE 54 (H)    PO2, VENOUS - POINT OF CARE 31 (L)    BASE EXCESS / DEFICIT, VENOUS - POINT OF CARE 8 (H)     HCO3, VENOUS - POINT OF CARE 33 (H)    TCO2 - POINT OF CARE 34 (H)    O2 SATURATION, VENOUS - POINT OF CARE 57 (L)    SODIUM - POINT OF CARE 146 (H)    POTASSIUM - POINT OF CARE 3.2 (L)    CALCIUM, IONIZED - POINT OF CARE     GLUCOSE - POINT OF CARE 128 (H)    HEMATOCRIT - POINT OF CARE 36.0    HEMOGLOBIN - POINT OF CARE 12.2   LACTIC ACID VENOUS WITH REFLEX - POINT OF CARE   Result Value    LACTIC ACID, VENOUS - POINT OF CARE 1.4         ED MEDICATIONS  ED Medication Orders (From admission, onward)    Ordered Start     Status Ordering Provider    07/25/21 0641 07/25/21 0642  LORazepam (ATIVAN) injection 1 mg  ONCE      Last MAR action: Given SINDHU ALEJANDRA    07/25/21 0641 07/25/21 0642  diphenhydrAMINE (BENADRYL) injection 25 mg  ONCE      Last MAR action: Given SINDHU ALEJANDRA    07/25/21 0617 07/25/21 0618  haloperidol lactate (HALDOL) 5 MG/ML injection 1 mg  ONCE      Last MAR action: Given SINDHU ALEJANDRA    07/25/21 0559 07/25/21 0600  haloperidol lactate (HALDOL) 5 MG/ML injection 1 mg  ONCE      Last MAR action: Given SINDHU ALEJANDAR    07/25/21 0544 07/25/21 0545  LORazepam (ATIVAN) injection 1 mg  ONCE      Last MAR action: Given SINDHU ALEJANDRA    07/25/21 0315 07/25/21 0316  sodium chloride (NORMAL SALINE) 0.9 % bolus 1,000 mL  ONCE      Last MAR action: Completed SINDHU ALEJANDRA          PROCEDURES    Procedures    CONSULTS:  3:16 AM     ED COURSE & MEDICAL DECISION MAKING      ED Course as of Jul 27 1015   Sun Jul 25, 2021   1018 Spoke to radiologist. 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     Paged hospitalist service.      [LD]   1023 Updated Dr. Schulz the hospitalist, on imaging results. Plan for admission to ICU    [LD]      ED Course User Index  [LD] Shasha Lei     78 year old female who presents to the emergency department for concerns for a facial droop which was reportedly witnessed by staff at the [facility].  Patient  has a history of prior CVA multiple sclerosis, Alzheimer's disease, dementia and is currently being treated for a urinary tract infection.  On arrival here in the emergency department the patient is somnolent however awakes to verbal stimuli, patient has a symmetrical face.  Patient denies any symptoms.  Patient denies any headache.  Patient denies any chest pain.  Patient denies any abdominal pain patient denies any nausea vomiting.  Patient was recently started on Cipro.    Patient's EKG demonstrated atrial fibrillation at rate of 70 beats per minute, and T-wave inversion in V1 through V6.  Patient's heart score was 5.  Patient's CT of the head demonstrated no acute intracranial process.  Patient was noted have a right occipital lobe encephalomalacia with right occipital craniotomy unchanged.  Patient's chest x-ray revealed scattered interstitial opacities in the left mid to lower lung probable scarring was stable cardiomegaly.  Patient's troponin was less than 0.02.  Patient's urinalysis demonstrated large leukocyte esterase, greater than 100 leukocytes, few bacteria suggestive of urinary tract infection.  Patient was given 2 g ceftriaxone.  Patient's sodium was 148 potassium 3.2 chloride 108 glucose of 127. Patient's lipase is 157 patient's hemoglobin hematocrit were 11.3 and 36.9 patient's white blood cell count was 7.0.  Patient's lactate was 1.4.  Patient was given Ativan Benadryl and Haldol here emergency department for agitation.  Patient will be admitted to the hospitalist service for further evaluation and management.  Patient's family at bedside was notified of the findings and agrees with this plan.        FINAL IMPRESSION      The primary encounter diagnosis was Altered mental status, unspecified altered mental status type. A diagnosis of Urinary tract infection without hematuria, site unspecified was also pertinent to this visit.      No follow-up provider specified.         Summary of your Discharge  Medications      You have not been prescribed any medications.         Closure:  The above stated findings were discussed with the patient in full detail.  All questions and concerns addressed.  The patient understands that this is a provisional diagnosis. Provisional diagnosis can and do change. The diagnosis that you are discharged with today is based on the symptoms with which you presented today. If any new symptoms occur or worsen, you should seek immediate attention for re-evaluation.       Jax Atwood,   07/27/21 1600

## 2025-01-01 NOTE — ED PROVIDER NOTE - PATIENT'S PREFERRED PRONOUN
What Is The Reason For Today's Visit?: History of Melanoma Additional History: Patient c/o spot on back of left arm and on top of right breast Year Excised?: 1986 Her/She

## 2025-01-01 NOTE — ED PROVIDER NOTE - CLINICAL SUMMARY MEDICAL DECISION MAKING FREE TEXT BOX
coccyx pain and posterior thigh pain likely 2/2 contusion. do not suspect fracture. Pt was at [facility] where XRs of the L hip, pelvis did not demonstrate fracture. will further evaluate with CT pelvis but overall suspicion for fx is low. Pt noted to be hypertensive here but asymptomatic. will treat with amlodipine 5 mg and connect pt to primary care services for further hypertension management.

## 2025-01-01 NOTE — ED PROVIDER NOTE - NSICDXPASTSURGICALHX_GEN_ALL_CORE_FT
PAST SURGICAL HISTORY:  H/O total knee replacement, right     surgical repair of left Rotator cuff 2009     surgical repair of right foot Bunion and hammer toe 2010     wide excision of left thigh Melanoma with removal of left groin lymh nodes 1980    
unable to perform

## 2025-01-01 NOTE — ED ADULT NURSE NOTE - ED STAT RN HANDOFF DETAILS 3
Report received from HARIS Bryant patient resting no signs of distress noted  comfort measures provided.

## 2025-01-01 NOTE — ED ADULT NURSE NOTE - NSFALLHARMRISKINTERV_ED_ALL_ED

## 2025-01-01 NOTE — ED PROVIDER NOTE - ATTENDING CONTRIBUTION TO CARE
I, Milton Torres MD, Emergency Medicine Attending Physician, personally saw and examined the patient and I personally made/approve the management plan and take responsibility for the patient management.    MDM: 84-year-old female with history of hypertension, hyperlipidemia, anxiety, obesity, hard of hearing, who presents since with coccyx and left buttock pain rating to the left thigh after mechanical fall 1 week ago.  Patient with elevated blood pressure but no symptoms of this.  Patient states that she has not taken her antihypertensives for several months because she no longer has a primary care doctor.  Patient was sent by urgent care for further evaluation.  Denies being on antiplatelets or anticoagulants.    ROS: denies LOC, syncope, head injury, neck pain, chest pain, shortness of breath, abdominal pain, numbness, weakness, vomiting, lightheadedness, vision changes, saddle anesthesia, bowel or bladder dysfunction.    Vitals with elevated blood pressure but otherwise stable.  NAD, NCAT, GCS 15, PERRL, EOMI without diplopia or discomfort, trachea midline, no stridor, CTAB, NRRR.  No chest wall tenderness, deformity or crepitus.  Abdomen soft, non-tender and non-distended, no rebound, no guarding, no rigidity. No CVA tenderness.  No signs of external trauma to chest and abdomen, no ecchymosis.  No tenderness or deformity to head, maxillo-facial, or midline of cervical/thoracic/lumbar vertebrae.  No tenderness, deformity or reduced ROM to all joints of all four extremities.  Pelvis stable.  Neurovascularly intact in all 4 extremities with 5/5 strength, normal sensation, equal pulses and brisk capillary refill, soft compartments.  Cranial nerves III-XII intact, no pronator drift, normal speech and gait.  No skin laceration.    Will obtain CT pelvis to eval for acute traumatic injury.  Pain control, antihypertensive and reassess.  Signed out at 4 PM pending imaging, blood pressure monitoring and close reassessments for further treatment and disposition decisions.

## 2025-01-01 NOTE — ED PROVIDER NOTE - NSICDXPASTMEDICALHX_GEN_ALL_CORE_FT
PAST MEDICAL HISTORY:  Anxiety     Cataract     [place] (hard of hearing) right ear     HTN (hypertension)     HTN (hypertension)     Obesity

## 2025-01-01 NOTE — ED PROVIDER NOTE - PHYSICAL EXAMINATION
Neuro: CN 2-12 intact, normal EOM, 5/5 shoulder abduction, elbow flexion/extension, wrist flexion/extension,  strength, 5/5 hip flexion, knee flexion/extension, ankle dorsiflexion/plantarflexion, sensation to light touch intact, normal steady gait, normal finger to nose, negative pronator drift,  HEENT: no palpable skull depressions or stepoffs. no scalp hemtomas or lacerations. no ttp or stepoffs to forehead, nasal bridge, mandible, TMJ. no septal hematoma. no adair's sign or raccoon eyes.  Cardiac: normal s1 and s2, warm and well perfused extremities, <2s cap refill  Pulmonary: lungs clear to auscultation, no wheezes, rhonchi or rales, bilateral breath sounds present. no ttp to sternum, ribcage.  MSK: No ttp or palpable deformities to bilateral clavicles, ac joints, shoulders, upper arms, elbows, forearms, wrists, anatomical snuffboxes, hands, fingers. No ttp or palpable deformities to pelvis, thighs, knees, shins, ankles, feet, toes. Pelvis is stable without laxity or ttp. Full aROM  Spine: no ttp or palpable stepoffs to midline cervical, thoracic, lumbar spinous processes

## 2025-01-02 DIAGNOSIS — S76.012A STRAIN OF MUSCLE, FASCIA AND TENDON OF LEFT HIP, INITIAL ENCOUNTER: ICD-10-CM

## 2025-01-02 PROCEDURE — 99223 1ST HOSP IP/OBS HIGH 75: CPT

## 2025-01-02 RX ORDER — LOSARTAN POTASSIUM 100 MG/1
25 TABLET, FILM COATED ORAL DAILY
Refills: 0 | Status: DISCONTINUED | OUTPATIENT
Start: 2025-01-02 | End: 2025-01-04

## 2025-01-02 RX ORDER — ATORVASTATIN CALCIUM 40 MG/1
20 TABLET, FILM COATED ORAL AT BEDTIME
Refills: 0 | Status: DISCONTINUED | OUTPATIENT
Start: 2025-01-02 | End: 2025-01-07

## 2025-01-02 RX ORDER — HEPARIN SODIUM 1000 [USP'U]/ML
5000 INJECTION, SOLUTION INTRAVENOUS; SUBCUTANEOUS EVERY 8 HOURS
Refills: 0 | Status: DISCONTINUED | OUTPATIENT
Start: 2025-01-02 | End: 2025-01-07

## 2025-01-02 RX ORDER — ACETAMINOPHEN 80 MG/.8ML
1000 SOLUTION/ DROPS ORAL ONCE
Refills: 0 | Status: COMPLETED | OUTPATIENT
Start: 2025-01-02 | End: 2025-01-02

## 2025-01-02 RX ADMIN — HEPARIN SODIUM 5000 UNIT(S): 1000 INJECTION, SOLUTION INTRAVENOUS; SUBCUTANEOUS at 14:53

## 2025-01-02 RX ADMIN — ACETAMINOPHEN 400 MILLIGRAM(S): 80 SOLUTION/ DROPS ORAL at 03:15

## 2025-01-02 RX ADMIN — ATORVASTATIN CALCIUM 20 MILLIGRAM(S): 40 TABLET, FILM COATED ORAL at 22:09

## 2025-01-02 RX ADMIN — ACETAMINOPHEN 1000 MILLIGRAM(S): 80 SOLUTION/ DROPS ORAL at 04:15

## 2025-01-02 RX ADMIN — LOSARTAN POTASSIUM 25 MILLIGRAM(S): 100 TABLET, FILM COATED ORAL at 18:46

## 2025-01-02 RX ADMIN — HEPARIN SODIUM 5000 UNIT(S): 1000 INJECTION, SOLUTION INTRAVENOUS; SUBCUTANEOUS at 22:09

## 2025-01-02 NOTE — ED CDU PROVIDER INITIAL DAY NOTE - ATTENDING APP SHARED VISIT CONTRIBUTION OF CARE
Hx: pt s/p fall L side with persistent L hip and pelvic pain, worse with movement.  No neuro sxs.    PE: well appearing, nontoxic, no respiratory distress.  Neuro nonfocal.  Skin intact. Psych normal mood.  +td soft tissue L lateral pelvis.    No hip td with external/internal rotation.   Nv intact distal    MDM: L hip and pelvis strain, xray and ct negative for fracture, pt able to ambulate with cane and bear weight on extremity, on exam patient without bony td of hip.  Would: pain meds -- anti-inflammatories, opiates for breakthrough, ice pack, lidocaine patch, physical therapy consult. If pain worsens, can consider MRI pelvis/hip but clinically without hip fracture at this time.

## 2025-01-02 NOTE — ED CDU PROVIDER INITIAL DAY NOTE - PROGRESS NOTE DETAILS
Patient evaluated by physical therapy, recommending subacute rehab.  Spoke with social work/case management.  Based on patient's insurance, they will need a 3 night stay so patient will be admitted for rehab placement.  Patient agreeable with rehab placement. chaitanya pt seen and eval painimproving but stillpresent able to amb w walker but seen by pt and reccomending need for rehab

## 2025-01-02 NOTE — PHYSICAL THERAPY INITIAL EVALUATION ADULT - PERTINENT HX OF CURRENT PROBLEM, REHAB EVAL
84F hx htn, obesity, hard of hearing, anxiety here with coccyx and L buttock pain radiating to the left thigh after a fall about 1 week ago. States she was walking in [place] when she lost her footing and fell onto her buttock. Denies LOC or headstrike. States that since then she has been having pain when moving - such as getting out of bed or sitting up. Has been ambulating with her cane. Lives alone. Reports history of urinary incontinence for the last 5 years, denies worsening since fall a  week ago. Denies extremity weakness or numbness. States she has not taken her antihypertensives in an while because she no longer has a PCP.   ED course: Hypertensive, given Norvasc. CT negative for fracture. Plan for PT consult and pain control in CDU.

## 2025-01-02 NOTE — ED CDU PROVIDER DISPOSITION NOTE - CLINICAL COURSE
84F hx htn, obesity, hard of hearing, anxiety here with coccyx and L buttock pain radiating to the left thigh after a fall about 1 week ago. States she was walking in [place] when she lost her footing and fell onto her buttock. Denies LOC or headstrike. States that since then she has been having pain when moving - such as getting out of bed or sitting up. Has been ambulating with her cane. Lives alone. Reports history of urinary incontinence for the last 5 years, denies worsening since fall a  week ago. Denies extremity weakness or numbness. States she has not taken her antihypertensives in an while because she no longer has a PCP.   ED course: Hypertensive, given Norvasc. CT negative for fracture. Plan for PT consult and pain control in CDU. 84F hx htn, obesity, hard of hearing, anxiety here with coccyx and L buttock pain radiating to the left thigh after a fall about 1 week ago. States she was walking in [place] when she lost her footing and fell onto her buttock. Denies LOC or headstrike. States that since then she has been having pain when moving - such as getting out of bed or sitting up. Has been ambulating with her cane. Lives alone. Reports history of urinary incontinence for the last 5 years, denies worsening since fall a  week ago. Denies extremity weakness or numbness. States she has not taken her antihypertensives in an while because she no longer has a PCP.   ED course: Hypertensive, given Norvasc. CT negative for fracture. Plan for PT consult and pain control in CDU.    In CDU, Patient evaluated by physical therapy, recommending subacute rehab.  Spoke with social work/case management.  Based on patient's insurance, they will need a 3 night stay so patient will be admitted for rehab placement.  Patient agreeable with rehab placement.

## 2025-01-02 NOTE — ED CDU PROVIDER INITIAL DAY NOTE - NSICDXPASTMEDICALHX_GEN_ALL_CORE_FT
PAST MEDICAL HISTORY:  Anxiety     Cataract     [place] (hard of hearing) right ear     HTN (hypertension)     HTN (hypertension)     Obesity

## 2025-01-02 NOTE — PHYSICAL THERAPY INITIAL EVALUATION ADULT - ADDITIONAL COMMENTS
Pt lives alone in a condo with 4 steps to enter. Prior to admission, pt was independent with quad cane and independent in ADLs.

## 2025-01-02 NOTE — ED CDU PROVIDER DISPOSITION NOTE - ATTENDING CONTRIBUTION TO CARE
I , Dr Nemesio Gibbs has seen and evaluated the patient.  The pateient has perisitant pain and difficulty ambulating and needs rehab will need admsiison

## 2025-01-02 NOTE — ED ADULT NURSE REASSESSMENT NOTE - NSFALLHARMRISKINTERV_ED_ALL_ED

## 2025-01-02 NOTE — PHYSICAL THERAPY INITIAL EVALUATION ADULT - NSPTDISCHREC_GEN_A_CORE
if home, home PT, assist with ALL mobility/ADLs, transportation into home, rolling walker, poly-fly w/c for longer distances, 3:1 commode/[facility]acute [facility]

## 2025-01-02 NOTE — ED ADULT NURSE REASSESSMENT NOTE - NS ED NURSE REASSESS COMMENT FT1
Report to Maria Elena RN Pt on floor Transferred awake alert and orientedx3 Denies discomfort
Pt received from HARIS Thacker. Pt A&O X4, [place], has BL hearing aid. Pt oriented to CDU & plan of care discussed. Pt in CDU for Pain control and PT consult. Pt denies any chest pain, SOB, dizziness or palpitations. /97. KWESI Yoo made aware. IV 20G Rt AC, patent and free of signs of infiltration. Pt ambulated to BR with walker and assist. Fall and safety precautions initiated and reviewed with patient, Pt verbalized understanding of the same. Call bell in reach. Will continue to monitor.

## 2025-01-02 NOTE — H&P ADULT - HISTORY OF PRESENT ILLNESS
84F        hx   HTN,   obesity, anxiety.  h/o falls . obesity       here with coccyx, L buttock pain and L back of thigh pain since a fall 1 week ago.      pt states she was walking at [employer] last week when she lost her footing and fell onto her buttock.     states no  loc  or headstrike. states since then experiencing symptoms to above areas when she is moving - such as getting out of bed or sitting up.     she has been walking . no back pain, extremity weakness or numbness, chest pain, sob, headache, vision changes, abdominal pain, difficulties urinating.     states she has not taken her antihypertensives for several months because she no longer has a   dr

## 2025-01-02 NOTE — ED CDU PROVIDER INITIAL DAY NOTE - OBJECTIVE STATEMENT
84F hx htn, obesity, hard of hearing, anxiety here with coccyx and L buttock pain radiating to the left thigh after a fall about 1 week ago. States she was walking in [place] when she lost her footing and fell onto her buttock. Denies LOC or headstrike. States that since then she has been having pain when moving - such as getting out of bed or sitting up. Has been ambulating with her cane. Lives alone. Reports history of urinary incontinence for the last 5 years, denies worsening since fall a  week ago. Denies extremity weakness or numbness. States she has not taken her antihypertensives in an while because she no longer has a PCP.   ED course: Hypertensive, given Norvasc. CT negative for fracture. Plan for PT consult and pain control in CDU.

## 2025-01-02 NOTE — H&P ADULT - NSHPLABSRESULTS_GEN_ALL_CORE
LABS:                        14.5   6.32  )-----------( 304      ( 01 Jan 2025 22:22 )             45.0     01-01    142  |  103  |  18  ----------------------------<  98  3.7   |  25  |  0.65    Ca    10.1      01 Jan 2025 22:22    TPro  8.1  /  Alb  4.7  /  TBili  0.4  /  DBili  x   /  AST  24  /  ALT  16  /  AlkPhos  90  01-01    PT/INR - ( 01 Jan 2025 23:03 )   PT: 11.6 sec;   INR: 1.01 ratio         PTT - ( 01 Jan 2025 23:03 )  PTT:29.9 sec      Urinalysis Basic - ( 01 Jan 2025 22:22 )    Color: x / Appearance: x / SG: x / pH: x  Gluc: 98 mg/dL / Ketone: x  / Bili: x / Urobili: x   Blood: x / Protein: x / Nitrite: x   Leuk Esterase: x / RBC: x / WBC x   Sq Epi: x / Non Sq Epi: x / Bacteria: x

## 2025-01-02 NOTE — H&P ADULT - NSHPPHYSICALEXAM_GEN_ALL_CORE
T(C): 36.4 (01-02-25 @ 07:47), Max: 36.9 (01-01-25 @ 17:17)  HR: 76 (01-02-25 @ 07:47) (70 - 87)  BP: 169/91 (01-02-25 @ 07:47) (148/81 - 208/95)  RR: 18 (01-02-25 @ 07:47) (17 - 20)  SpO2: 96% (01-02-25 @ 07:47) (94% - 96%)  GENERAL: NAD, lying in bed comfortably  HEAD:  Atraumatic, normocephalic  EYES: EOMI, PERRLA, conjunctiva and sclera clear  NECK: Supple, trachea midline, no JVD  HEART: Regular rate and rhythm, no murmurs, rubs, or gallops  LUNGS: Unlabored respirations.  Clear to auscultation bilaterally, no crackles, wheezing, or rhonchi  ABDOMEN: Soft, nontender, nondistended, +BS  EXTREMITIES: 2+ peripheral pulses bilaterally. No clubbing, cyanosis, or edema  NERVOUS SYSTEM:  A&Ox3, moving all extremities, no focal deficits   SKIN: No rashes or lesions

## 2025-01-02 NOTE — PHYSICAL THERAPY INITIAL EVALUATION ADULT - GAIT TRAINING, PT EVAL
"I have heaviness on the chest and left arm pain."
Goal: Pt will amb 150ft independently with least restrictive device in 2-weeks.

## 2025-01-02 NOTE — CONSULT NOTE ADULT - ASSESSMENT
: 84F hx htn, obesity, anxiety here with coccyx, L buttock pain and L back of thigh pain since a fall 1 week ago. pt states she was walking [place] [employer] last week when she lost her footing and fell onto her buttock. states no LOC or headstrike. states since then experiencing symptoms to above areas when she is moving - such as getting out of bed or sitting up. she has been walking. no back pain, extremity weakness or numbness, chest pain, sob, headache, vision changes, abdominal pain, difficulties urinating. states she has not taken her antihypertensives for several months because she no longer has a PCP. she does not have her phone and is not sure what meds she is supposed to take,  pt with hx of htn with hx of syncope, s/p ILR never followed with ?fall ?Syncope  will check ILR   repeat echo , pt with hx of AS  check orthostatic  observe on tele  tsh/ b12 level  dvt prophylaxis

## 2025-01-02 NOTE — H&P ADULT - ASSESSMENT
84F        hx   HTN,   obesity, anxiety.  h/o falls . obesity       s/p  fall a  wek  ago.  now   here with coccyx, L buttock pain and L back of thigh      p  was walking  in  [place] last week when she lost her footing and fell onto her buttock.     states no  loc  or headstrike. states since then experiencing symptoms to above areas when she is moving - such as getting out of bed or sitting up.     she has been walking . no back pain, extremity weakness or numbness, chest pain, sob, headache, vision changes, abdominal pain, difficulties urinating.     states she has not taken her antihypertensives for several months because she no longer has a   dr         s/p  fall        with   buttock pain/ since  fall  CT , no  fx       ct  head, no  bleed/  ct pelvis,  no  fx     HTN/  HLD         cozaar  now.  ha s not  taken he r meds  for  long time  obesity/  BMI 34    h/o  prior    falls     priro  echo,  mod  TR   check   orthostatics .  fall  risk     PT  eval    dvt ppx        rad< from: CT 3D Reconstruct w/ Workstation (01.01.25 @ 19:33) >  DOMINAL WALL: Small fat-containing left inguinal hernia. Small right   inguinal hernia containing fat and a nonobstructed loop of small bowel.  BONES: No fracture or dislocation. Mild to moderate right hip   degenerative change. Degenerative changes of the visualized lower lumbar   spne, sacroiliac joints andpubic symphysis.  IMPRESSION:  No fracture or dislocation of the pelvis or either hip.  --- End of Report ---        < 84F        hx   HTN,   obesity, anxiety.  h/o falls /  and   mlple  syncopal  events in past/ seen  by  ep/  had  ILR  in 2022,. obesity       s/p  fall a  wek  ago.  now   here with coccyx, L buttock pain and L back of thigh      p  was walking  in  [place] last week when she lost her footing and fell onto her buttock.     states no  loc  or headstrike. states since then experiencing symptoms to above areas when she is moving - such as getting out of bed or sitting up.     she has been walking . no back pain, extremity weakness or numbness, chest pain, sob, headache, vision changes, abdominal pain, difficulties urinating.     states she has not taken her antihypertensives for several months because she no longer has a   dr         s/p  fall/ probable  syncope.           given h/p  prior  syncopal events      had  ILR  in  2022/  card  f/p        with   buttock pain/ since  fall  CT , no  fx       ct  head, no  bleed/  ct pelvis,  no  fx     HTN/  HLD         cozaar  now.  ha s not  taken he r meds  for  long time  obesity/  BMI 34   Depression        ha s not been on  hre  meds  for  long time    h/o  prior    falls     prior  echo,  mod  TR   check   orthostatics .  fall  risk     PT  eval    dvt ppx        rad< from: CT 3D Reconstruct w/ Workstation (01.01.25 @ 19:33) >  DOMINAL WALL: Small fat-containing left inguinal hernia. Small right   inguinal hernia containing fat and a nonobstructed loop of small bowel.  BONES: No fracture or dislocation. Mild to moderate right hip   degenerative change. Degenerative changes of the visualized lower lumbar   spne, sacroiliac joints andpubic symphysis.  IMPRESSION:  No fracture or dislocation of the pelvis or either hip.  --- End of Report ---        < 84F        hx   HTN,   obesity, anxiety.  h/o falls /  and   mlple  syncopal  events in past/ seen  by  ep/  had  ILR  in 2022,. obesity       s/p  fall a  wek  ago.  now   here with coccyx, L buttock pain and L back of thigh      p  was [place] when she lost her footing and fell onto her buttock.     states no  loc  or headstrike. states since then experiencing symptoms to above areas when she is moving - such as getting out of bed or sitting up.     she has been walking . no back pain, extremity weakness or numbness, chest pain, sob, headache, vision changes, abdominal pain, difficulties urinating.     states she has not taken her antihypertensives for several months because she no longer has a   dr         s/p  fall/ probable  syncope.           given h/p  prior  syncopal events      had  ILR / Abott  in  2022  for  symptomatic bradycardia / card  f/p        with   buttock pain/ since  fall  CT , no  fx       ct  head, no  bleed/  ct pelvis,  no  fx     HTN/  HLD         cozaar  now.  ha s not  taken he r meds  for  long time  obesity/  BMI 34   Depression        ha s not been on  hre  meds  for  long time    h/o  prior    falls     prior  echo,  mod  TR   check   orthostatics .  fall  risk     PT  eval    dvt ppx        rad< from: CT 3D Reconstruct w/ Workstation (01.01.25 @ 19:33) >  DOMINAL WALL: Small fat-containing left inguinal hernia. Small right   inguinal hernia containing fat and a nonobstructed loop of small bowel.  BONES: No fracture or dislocation. Mild to moderate right hip   degenerative change. Degenerative changes of the visualized lower lumbar   spne, sacroiliac joints andpubic symphysis.  IMPRESSION:  No fracture or dislocation of the pelvis or either hip.  --- End of Report ---        < 84F        hx   HTN,   obesity, anxiety.  h/o falls /  and   mlple  syncopal  events in past/ seen  by  ep/  had  ILR  in 2022,. obesity       s/p  fall a  wek  ago.  now   here with coccyx, L buttock pain and L back of thigh      p  was walking  in  [place] last week when she lost her footing and fell onto her buttock.     states no  loc  or headstrike. states since then experiencing symptoms to above areas when she is moving - such as getting out of bed or sitting up.     she has been walking . no back pain, extremity weakness or numbness, chest pain, sob, headache, vision changes, abdominal pain, difficulties urinating.     states she has not taken her antihypertensives for several months because she no longer has a   dr         s/p  fall/ probable  syncope.  /  here  with  buttock pain.  since  he r fall         mois[place].  given h/p  prior  syncopal events/  with severe  facial  trauma.  etc        had  ILR / Abott  in  2022  for  symptomatic bradycardia / card  f/p/ pt ha s not seen a  d r mikayla  very  long time         CT , no  fx       ct  head, no  bleed/  ct pelvis,  no  fx     HTN/  HLD         cozaar  now.  ha s not  taken he r meds  for  long time  obesity/  BMI 34   Depression        ha s not been on  her  meds  for  long time    h/o  prior    falls     prior  echo,  mod  TR   check   orthostatics .  fall  risk     PT  eval    dvt ppx        rad< from: CT 3D Reconstruct w/ Workstation (01.01.25 @ 19:33) >  DOMINAL WALL: Small fat-containing left inguinal hernia. Small right   inguinal hernia containing fat and a nonobstructed loop of small bowel.  BONES: No fracture or dislocation. Mild to moderate right hip   degenerative change. Degenerative changes of the visualized lower lumbar   spne, sacroiliac joints andpubic symphysis.  IMPRESSION:  No fracture or dislocation of the pelvis or either hip.  --- End of Report ---        <

## 2025-01-02 NOTE — PHYSICAL THERAPY INITIAL EVALUATION ADULT - ACTIVE RANGE OF MOTION EXAMINATION, REHAB EVAL
b/l shoulder flexion grossly up to 110 degrees/bilateral upper extremity Active ROM was WFL (within functional limits)/bilateral  lower extremity Active ROM was WFL (within functional limits)

## 2025-01-02 NOTE — ED CDU PROVIDER DISPOSITION NOTE - NSFOLLOWUPINSTRUCTIONS_ED_ALL_ED_FT
Hydrate.     **PAIN CONTROL    You may be contacted by our Emergency Department Referrals Coordinator to set up your follow up appointment within 24-48 hours of your discharge Monday- Friday: Primary Care.     Please return to the Emergency Department with new, worsening, or concerning symptoms, such as:  -Shortness of breath or trouble breathing  -Pressure, pain, tightness in chest  -Facial drooping, arm weakness, or speech difficulty   -Head injury or loss of consciousness   -Nonstop bleeding or an open wound     *More detailed information regarding your visit and discharge can be found by reviewing this packet Hydrate.     **PAIN CONTROL    Start taking Norvasc 5mg once a day.     You may be contacted by our Emergency Department Referrals Coordinator to set up your follow up appointment within 24-48 hours of your discharge Monday- Friday: Primary Care.     Please return to the Emergency Department with new, worsening, or concerning symptoms, such as:  -Shortness of breath or trouble breathing  -Pressure, pain, tightness in chest  -Facial drooping, arm weakness, or speech difficulty   -Head injury or loss of consciousness   -Nonstop bleeding or an open wound     *More detailed information regarding your visit and discharge can be found by reviewing this packet

## 2025-01-02 NOTE — CONSULT NOTE ADULT - SUBJECTIVE AND OBJECTIVE BOX
Date of Service, 01-02-25 @ 10:06  CHIEF COMPLAINT:Patient is a 84y old  Female who presents with a chief complaint of fall (02 Jan 2025 09:19)      HPI:  : 84F hx htn, obesity, anxiety here with coccyx, L buttock pain and L back of thigh pain since a fall 1 week ago. pt states she was walking at [employer] last week when she lost her footing and fell onto her buttock. states no LOC or headstrike. states since then experiencing symptoms to above areas when she is moving - such as getting out of bed or sitting up. she has been walking. no back pain, extremity weakness or numbness, chest pain, sob, headache, vision changes, abdominal pain, difficulties urinating. states she has not taken her antihypertensives for several months because she no longer has a PCP. she does not have her phone and is not sure what meds she is supposed to take,      PAST MEDICAL & SURGICAL HISTORY:  HTN (hypertension)  Anxiety  Obesity  Cataract  [place] (hard of hearing) right ear  HTN (hypertension)  surgical repair of left Rotator cuff 2009  surgical repair of right foot Bunion and hammer toe 2010  wide excision of left thigh Melanoma with removal of left groin lymh nodes  1980  H/O total knee replacement, right    MEDICATIONS  (STANDING):  atorvastatin 20 milliGRAM(s) Oral at bedtime  heparin   Injectable 5000 Unit(s) SubCutaneous every 8 hours  losartan 25 milliGRAM(s) Oral daily    MEDICATIONS  (PRN):      FAMILY HISTORY:  No pertinent family history in first degree relatives        SOCIAL HISTORY:    [x ] Non-smoker  [ ] Smoker  [ ] Alcohol    Allergies    No Known Allergies    Intolerances    	    REVIEW OF SYSTEMS:  CONSTITUTIONAL: No fever, weight loss, or fatigue  EYES: No eye pain, visual disturbances, or discharge  ENT:  No difficulty hearing, tinnitus, vertigo; No sinus or throat pain  NECK: No pain or stiffness  RESPIRATORY: No cough, wheezing, chills or hemoptysis; No Shortness of Breath  CARDIOVASCULAR: No chest pain, palpitations, passing out, dizziness, or leg swelling  GASTROINTESTINAL: No abdominal or epigastric pain. No nausea, vomiting, or hematemesis; No diarrhea or constipation. No melena or hematochezia.  GENITOURINARY: No dysuria, frequency, hematuria, or incontinence  NEUROLOGICAL: No headaches, memory loss, loss of strength, numbness, or tremors  SKIN: No itching, burning, rashes, or lesions   LYMPH Nodes: No enlarged glands  ENDOCRINE: No heat or cold intolerance; No hair loss  MUSCULOSKELETAL: No joint pain or swelling; No muscle, back, or extremity pain  PSYCHIATRIC: No depression, anxiety, mood swings, or difficulty sleeping  HEME/LYMPH: No easy bruising, or bleeding gums  ALLERGY AND IMMUNOLOGIC: No hives or eczema	    [ ] All others negative	  [x ] Unable to obtain    PHYSICAL EXAM:  T(C): 36.4 (01-02-25 @ 07:47), Max: 36.9 (01-01-25 @ 17:17)  HR: 76 (01-02-25 @ 07:47) (70 - 87)  BP: 169/91 (01-02-25 @ 07:47) (148/81 - 208/95)  RR: 18 (01-02-25 @ 07:47) (17 - 20)  SpO2: 96% (01-02-25 @ 07:47) (94% - 96%)  Wt(kg): --  I&O's Summary      Appearance: Normal	  HEENT:   Normal oral mucosa, PERRL, EOMI	  Lymphatic: No lymphadenopathy  Cardiovascular: Normal S1 S2, No JVD,+murmurs, No edema  Respiratory: rhonchi  Psychiatry: A & O x 3, Mood & affect appropriate  Gastrointestinal:  Soft, Non-tender, + BS	  Skin: No rashes, No ecchymoses, No cyanosis	  Extremities: Normal range of motion, No clubbing, cyanosis or edema  Vascular: Peripheral pulses palpable 2+ bilaterally    TELEMETRY: 	    ECG:  	  RADIOLOGY:  OTHER: 	  	  LABS:	 	    CARDIAC MARKERS:                              14.5   6.32  )-----------( 304      ( 01 Jan 2025 22:22 )             45.0     01-01    142  |  103  |  18  ----------------------------<  98  3.7   |  25  |  0.65    Ca    10.1      01 Jan 2025 22:22    TPro  8.1  /  Alb  4.7  /  TBili  0.4  /  DBili  x   /  AST  24  /  ALT  16  /  AlkPhos  90  01-01    proBNP:   Lipid Profile:   HgA1c:   TSH:   PT/INR - ( 01 Jan 2025 23:03 )   PT: 11.6 sec;   INR: 1.01 ratio         PTT - ( 01 Jan 2025 23:03 )  PTT:29.9 sec    PREVIOUS DIAGNOSTIC TESTING:    < from: 12 Lead ECG (03.22.23 @ 16:06) >  Diagnosis Line SINUS RHYTHM WITH 1ST DEGREE A-V BLOCK WITH OCCASIONAL PREMATURE VENTRICULAR COMPLEXES  LEFT VENTRICULAR HYPERTROPHY WITH REPOLARIZATION ABNORMALITY  LEFT ATRIAL ENLARGEMENT  INFERIOR INFARCT , AGE UNDETERMINED  NONSPECIFIC ST AND T WAVE ABNORMALITY  CONSIDER ANTERIOR MI  ABNORMAL ECG  WHEN COMPARED WITH ECG OF 11-JAN-2022 07:23,  PREMATURE BEATS NEW; RATE FASTER; INFERIOR MI NEW    < from: Transthoracic Echocardiogram (01.10.22 @ 12:55) >  Mitral Valve: Mitral annular calcification. Minimal mitral  regurgitation.  Aortic Valve/Aorta: Mild aortic stenosis:  ---LVOT VTI 20.0 cm, aortic valve VTI 38.1 cm. DI = 0.52  ---Aortic valve area by continuity 1.6 cm2.  Mild-moderate aortic regurgitation.  Normal aortic root size.  Left Atrium: Mildly dilated left atrium.  Left Ventricle: Normal left ventricular internal dimensions  and wall thicknesses.  Normal left ventricular systolic function. No segmental  wall motion abnormalities.  Impaired LV-relaxation with normal filling pressure.  Right Heart: Normal right atrium. Normal right ventricular  size and function.  Normal tricuspid valve. Mild-moderate tricuspid  regurgitation.  Normal pulmonic valve.  Pericardium/Pleura: Normal pericardium with no pericardial  effusion.  Hemodynamic: Estimated right atrial pressure is normal.  No evidence of pulmonary hypertension.  No PFO seen with color Doppler.  ------------------------------------------------------------------------  Conclusions:  Normal left ventricular systolic function. No segmental  wall motion abnormalities.  Mild aortic stenosis.    < from: CT Pelvis No Cont (01.01.25 @ 19:33) >  No fracture or dislocation of the pelvis or either hip.    < end of copied text >

## 2025-01-03 LAB
ANION GAP SERPL CALC-SCNC: 13 MMOL/L — SIGNIFICANT CHANGE UP (ref 5–17)
BUN SERPL-MCNC: 20 MG/DL — SIGNIFICANT CHANGE UP (ref 7–23)
CALCIUM SERPL-MCNC: 9.6 MG/DL — SIGNIFICANT CHANGE UP (ref 8.4–10.5)
CHLORIDE SERPL-SCNC: 105 MMOL/L — SIGNIFICANT CHANGE UP (ref 96–108)
CO2 SERPL-SCNC: 23 MMOL/L — SIGNIFICANT CHANGE UP (ref 22–31)
CREAT SERPL-MCNC: 0.66 MG/DL — SIGNIFICANT CHANGE UP (ref 0.5–1.3)
EGFR: 86 ML/MIN/1.73M2 — SIGNIFICANT CHANGE UP
GLUCOSE SERPL-MCNC: 90 MG/DL — SIGNIFICANT CHANGE UP (ref 70–99)
HCT VFR BLD CALC: 40.3 % — SIGNIFICANT CHANGE UP (ref 34.5–45)
HGB BLD-MCNC: 12.7 G/DL — SIGNIFICANT CHANGE UP (ref 11.5–15.5)
MCHC RBC-ENTMCNC: 28.5 PG — SIGNIFICANT CHANGE UP (ref 27–34)
MCHC RBC-ENTMCNC: 31.5 G/DL — LOW (ref 32–36)
MCV RBC AUTO: 90.4 FL — SIGNIFICANT CHANGE UP (ref 80–100)
NRBC # BLD: 0 /100 WBCS — SIGNIFICANT CHANGE UP (ref 0–0)
PLATELET # BLD AUTO: 264 K/UL — SIGNIFICANT CHANGE UP (ref 150–400)
POTASSIUM SERPL-MCNC: 3.7 MMOL/L — SIGNIFICANT CHANGE UP (ref 3.5–5.3)
POTASSIUM SERPL-SCNC: 3.7 MMOL/L — SIGNIFICANT CHANGE UP (ref 3.5–5.3)
RBC # BLD: 4.46 M/UL — SIGNIFICANT CHANGE UP (ref 3.8–5.2)
RBC # FLD: 13.9 % — SIGNIFICANT CHANGE UP (ref 10.3–14.5)
SODIUM SERPL-SCNC: 141 MMOL/L — SIGNIFICANT CHANGE UP (ref 135–145)
TSH SERPL-MCNC: 0.54 UIU/ML — SIGNIFICANT CHANGE UP (ref 0.27–4.2)
VIT B12 SERPL-MCNC: 767 PG/ML — SIGNIFICANT CHANGE UP (ref 232–1245)
WBC # BLD: 5.66 K/UL — SIGNIFICANT CHANGE UP (ref 3.8–10.5)
WBC # FLD AUTO: 5.66 K/UL — SIGNIFICANT CHANGE UP (ref 3.8–10.5)

## 2025-01-03 RX ORDER — ACETAMINOPHEN 80 MG/.8ML
650 SOLUTION/ DROPS ORAL EVERY 6 HOURS
Refills: 0 | Status: DISCONTINUED | OUTPATIENT
Start: 2025-01-03 | End: 2025-01-07

## 2025-01-03 RX ORDER — LIDOCAINE 50 MG/G
1 OINTMENT TOPICAL DAILY
Refills: 0 | Status: DISCONTINUED | OUTPATIENT
Start: 2025-01-03 | End: 2025-01-07

## 2025-01-03 RX ADMIN — HEPARIN SODIUM 5000 UNIT(S): 1000 INJECTION, SOLUTION INTRAVENOUS; SUBCUTANEOUS at 05:17

## 2025-01-03 RX ADMIN — ACETAMINOPHEN 650 MILLIGRAM(S): 80 SOLUTION/ DROPS ORAL at 15:28

## 2025-01-03 RX ADMIN — ACETAMINOPHEN 650 MILLIGRAM(S): 80 SOLUTION/ DROPS ORAL at 15:58

## 2025-01-03 RX ADMIN — HEPARIN SODIUM 5000 UNIT(S): 1000 INJECTION, SOLUTION INTRAVENOUS; SUBCUTANEOUS at 15:28

## 2025-01-03 RX ADMIN — LOSARTAN POTASSIUM 25 MILLIGRAM(S): 100 TABLET, FILM COATED ORAL at 05:17

## 2025-01-03 RX ADMIN — LIDOCAINE 1 PATCH: 50 OINTMENT TOPICAL at 20:00

## 2025-01-03 RX ADMIN — HEPARIN SODIUM 5000 UNIT(S): 1000 INJECTION, SOLUTION INTRAVENOUS; SUBCUTANEOUS at 21:39

## 2025-01-03 RX ADMIN — LIDOCAINE 1 PATCH: 50 OINTMENT TOPICAL at 15:28

## 2025-01-03 RX ADMIN — ATORVASTATIN CALCIUM 20 MILLIGRAM(S): 40 TABLET, FILM COATED ORAL at 21:39

## 2025-01-03 NOTE — PATIENT PROFILE ADULT - NSPROGENOTHERPROVIDER_GEN_A_NUR
A High Calorie/High Protein diet is recommended to meet your nutritional needs:  Recommend taking two (2) protein shakes daily for one month following discharge from hospital for muscle preservation and healing promotion. Protein shake should contain at least 18 grams protein per shake or more.   Consider an unflavored protein powder (Vital Protein, UNJury, Isopure or similar store-brand powders) dissolved into hot/warm fluids like tea/coffee, broth, soups, etc.  Consider a high protein/high calorie shake (Ensure Complete, Ensure Plus, Boost High Protein, Boost Very High Calorie or store brand equivalents)    Aim to eat 4-6 smaller, protein-containing meals/snacks daily if you have a smaller appetite to encourage calories and protein throughout the day. Protein sources include eggs, chicken/turkey, fish, lean meats, dairy (if tolerated) - Greek yogurt has more protein than regular yogurt, nuts/nut butters, etc.    A dietitian can help personalize your nutrition plan if you are on a special diet or have difficulty swallowing.    Nutrition-Related Questions: Katerin SINGH (Dietitian)  Phone: [phone]    
none

## 2025-01-04 RX ORDER — LOSARTAN POTASSIUM 100 MG/1
100 TABLET, FILM COATED ORAL DAILY
Refills: 0 | Status: DISCONTINUED | OUTPATIENT
Start: 2025-01-04 | End: 2025-01-07

## 2025-01-04 RX ADMIN — LIDOCAINE 1 PATCH: 50 OINTMENT TOPICAL at 03:00

## 2025-01-04 RX ADMIN — LOSARTAN POTASSIUM 100 MILLIGRAM(S): 100 TABLET, FILM COATED ORAL at 21:30

## 2025-01-04 RX ADMIN — LIDOCAINE 1 PATCH: 50 OINTMENT TOPICAL at 12:47

## 2025-01-04 RX ADMIN — LOSARTAN POTASSIUM 25 MILLIGRAM(S): 100 TABLET, FILM COATED ORAL at 05:54

## 2025-01-04 RX ADMIN — LIDOCAINE 1 PATCH: 50 OINTMENT TOPICAL at 19:30

## 2025-01-04 RX ADMIN — ATORVASTATIN CALCIUM 20 MILLIGRAM(S): 40 TABLET, FILM COATED ORAL at 21:30

## 2025-01-04 RX ADMIN — HEPARIN SODIUM 5000 UNIT(S): 1000 INJECTION, SOLUTION INTRAVENOUS; SUBCUTANEOUS at 21:30

## 2025-01-04 RX ADMIN — HEPARIN SODIUM 5000 UNIT(S): 1000 INJECTION, SOLUTION INTRAVENOUS; SUBCUTANEOUS at 12:48

## 2025-01-04 RX ADMIN — HEPARIN SODIUM 5000 UNIT(S): 1000 INJECTION, SOLUTION INTRAVENOUS; SUBCUTANEOUS at 05:54

## 2025-01-05 RX ORDER — ASPIRIN 81 MG
81 TABLET, DELAYED RELEASE (ENTERIC COATED) ORAL DAILY
Refills: 0 | Status: DISCONTINUED | OUTPATIENT
Start: 2025-01-05 | End: 2025-01-07

## 2025-01-05 RX ADMIN — LIDOCAINE 1 PATCH: 50 OINTMENT TOPICAL at 10:17

## 2025-01-05 RX ADMIN — LIDOCAINE 1 PATCH: 50 OINTMENT TOPICAL at 22:12

## 2025-01-05 RX ADMIN — HEPARIN SODIUM 5000 UNIT(S): 1000 INJECTION, SOLUTION INTRAVENOUS; SUBCUTANEOUS at 21:57

## 2025-01-05 RX ADMIN — ATORVASTATIN CALCIUM 20 MILLIGRAM(S): 40 TABLET, FILM COATED ORAL at 21:56

## 2025-01-05 RX ADMIN — LIDOCAINE 1 PATCH: 50 OINTMENT TOPICAL at 00:00

## 2025-01-05 RX ADMIN — Medication 2.5 MILLIGRAM(S): at 11:06

## 2025-01-05 RX ADMIN — HEPARIN SODIUM 5000 UNIT(S): 1000 INJECTION, SOLUTION INTRAVENOUS; SUBCUTANEOUS at 05:38

## 2025-01-05 RX ADMIN — Medication 81 MILLIGRAM(S): at 11:06

## 2025-01-05 RX ADMIN — HEPARIN SODIUM 5000 UNIT(S): 1000 INJECTION, SOLUTION INTRAVENOUS; SUBCUTANEOUS at 13:12

## 2025-01-05 RX ADMIN — LIDOCAINE 1 PATCH: 50 OINTMENT TOPICAL at 18:34

## 2025-01-05 RX ADMIN — LOSARTAN POTASSIUM 100 MILLIGRAM(S): 100 TABLET, FILM COATED ORAL at 05:38

## 2025-01-06 ENCOUNTER — TRANSCRIPTION ENCOUNTER (OUTPATIENT)
Age: 85
End: 2025-01-06

## 2025-01-06 ENCOUNTER — RESULT REVIEW (OUTPATIENT)
Age: 85
End: 2025-01-06

## 2025-01-06 LAB
ANION GAP SERPL CALC-SCNC: 14 MMOL/L — SIGNIFICANT CHANGE UP (ref 5–17)
BUN SERPL-MCNC: 15 MG/DL — SIGNIFICANT CHANGE UP (ref 7–23)
CALCIUM SERPL-MCNC: 9.3 MG/DL — SIGNIFICANT CHANGE UP (ref 8.4–10.5)
CHLORIDE SERPL-SCNC: 101 MMOL/L — SIGNIFICANT CHANGE UP (ref 96–108)
CO2 SERPL-SCNC: 22 MMOL/L — SIGNIFICANT CHANGE UP (ref 22–31)
CREAT SERPL-MCNC: 0.46 MG/DL — LOW (ref 0.5–1.3)
EGFR: 94 ML/MIN/1.73M2 — SIGNIFICANT CHANGE UP
GLUCOSE BLDC GLUCOMTR-MCNC: 112 MG/DL — HIGH (ref 70–99)
GLUCOSE SERPL-MCNC: 115 MG/DL — HIGH (ref 70–99)
POTASSIUM SERPL-MCNC: 3.5 MMOL/L — SIGNIFICANT CHANGE UP (ref 3.5–5.3)
POTASSIUM SERPL-SCNC: 3.5 MMOL/L — SIGNIFICANT CHANGE UP (ref 3.5–5.3)
SODIUM SERPL-SCNC: 137 MMOL/L — SIGNIFICANT CHANGE UP (ref 135–145)

## 2025-01-06 PROCEDURE — 93306 TTE W/DOPPLER COMPLETE: CPT | Mod: 26

## 2025-01-06 RX ORDER — POTASSIUM CHLORIDE 600 MG/1
20 TABLET, FILM COATED, EXTENDED RELEASE ORAL ONCE
Refills: 0 | Status: COMPLETED | OUTPATIENT
Start: 2025-01-06 | End: 2025-01-06

## 2025-01-06 RX ADMIN — HEPARIN SODIUM 5000 UNIT(S): 1000 INJECTION, SOLUTION INTRAVENOUS; SUBCUTANEOUS at 14:26

## 2025-01-06 RX ADMIN — Medication 2.5 MILLIGRAM(S): at 05:26

## 2025-01-06 RX ADMIN — POTASSIUM CHLORIDE 20 MILLIEQUIVALENT(S): 600 TABLET, FILM COATED, EXTENDED RELEASE ORAL at 11:06

## 2025-01-06 RX ADMIN — LOSARTAN POTASSIUM 100 MILLIGRAM(S): 100 TABLET, FILM COATED ORAL at 05:26

## 2025-01-06 RX ADMIN — HEPARIN SODIUM 5000 UNIT(S): 1000 INJECTION, SOLUTION INTRAVENOUS; SUBCUTANEOUS at 05:26

## 2025-01-06 RX ADMIN — ACETAMINOPHEN 650 MILLIGRAM(S): 80 SOLUTION/ DROPS ORAL at 17:20

## 2025-01-06 RX ADMIN — HEPARIN SODIUM 5000 UNIT(S): 1000 INJECTION, SOLUTION INTRAVENOUS; SUBCUTANEOUS at 22:02

## 2025-01-06 RX ADMIN — LIDOCAINE 1 PATCH: 50 OINTMENT TOPICAL at 20:00

## 2025-01-06 RX ADMIN — Medication 81 MILLIGRAM(S): at 11:07

## 2025-01-06 RX ADMIN — LIDOCAINE 1 PATCH: 50 OINTMENT TOPICAL at 11:06

## 2025-01-06 RX ADMIN — ATORVASTATIN CALCIUM 20 MILLIGRAM(S): 40 TABLET, FILM COATED ORAL at 22:02

## 2025-01-06 RX ADMIN — ACETAMINOPHEN 650 MILLIGRAM(S): 80 SOLUTION/ DROPS ORAL at 17:50

## 2025-01-06 NOTE — DISCHARGE NOTE PROVIDER - NSDCCPCAREPLAN_GEN_ALL_CORE_FT
PRINCIPAL DISCHARGE DIAGNOSIS  Diagnosis: Strain of left hip  Assessment and Plan of Treatment: Your risk of falling increases as you grow older. That's because getting older can make it harder to walk steadily and keep your balance. Also, the effects of falls are more serious in older people.                         Several things can increase your risk of a fall such as illness, change in the medicines you take, unsafe or unfamiliar setting (for example, a pt educated to unit & routine/call bell within reach & pt demonstrated use back to this RN with rugs or furniture that might trip you, or an area you don't know well).                             It is important to tell your doctor about any times you have fallen or almost fallen. He or she can then suggest ways to prevent another fall. Some health problems can put you at risk of falling. These include conditions that affect eyesight, hearing, muscle strength, or balance.                                                        Certain medicines can increase the risk of falling. These include some medicines that are used for sleeping problems, anxiety, or depression. Adding new medicines, or changing doses of some medicines, can also affect your risk of falling. Your doctor might switch you to a different medicine.                                      Prevent falls by make your home safer – To avoid falling at home, get rid of things that might make you trip or slip. This might include furniture, electrical cords, clutter, and loose rugs. Keep your home well lit so that you can easily see where you are going.                                                                                                                Avoid storing things in high places so you don't have to reach or climb.                                                         Wear sturdy shoes that fit well – Wearing shoes with high heels or slippery soles, or shoes that are too loose, can lead to falls.

## 2025-01-06 NOTE — DISCHARGE NOTE PROVIDER - HOSPITAL COURSE
· Assessment	  84F        hx   HTN,   obesity, anxiety.  h/o falls /  and   mlple  syncopal  events in past/ seen  by  ep/  had  ILR  in 2022,. obesity       s/p  fall a  wek  ago.  now   here with coccyx, L buttock pain and L back of thigh      p  was walking  in  [place] last week when she lost her footing and fell onto her buttock.     states no  loc  or headstrike. states since then experiencing symptoms to above areas when she is moving - such as getting out of bed or sitting up.     she has been walking . no back pain, extremity weakness or numbness, chest pain, sob, headache, vision changes, abdominal pain, difficulties urinating.     states she has not taken her antihypertensives for several months because she no longer has a   dr         s/p  fall/ probable  syncope.,with  buttock pain.  since  he r fall,  has  improved         moisés.  given h/p  prior  syncopal events/  with severe  facial  trauma.  etc        had  ILR / Abott  in  2022  for  symptomatic bradycardia / card  f/p/ pt ha s not seen a  d r mikayla  very  long time         CT , no  fx       ct  head, no  bleed/  ct pelvis,  no  fx     HTN/  HLD         cozaar  now.  ha s not  taken he r meds  for  long time  obesity/  BMI 34   Depression        ha s not been on  her  meds  for  long time    h/o  prior    falls     prior  echo,  mod  TR      orthostatics .  negtaive   fall  risk     bp  meds  titrated    card following,   echo, normal  ef     PT  eval/    d/c  plans    dvt ppx      · Assessment	  84F        hx   HTN,   obesity, anxiety.  h/o falls /  and   mlple  syncopal  events in past/ seen  by  ep/  had  ILR  in 2022,. obesity       s/p  fall a  wek  ago.  now   here with coccyx, L buttock pain and L back of thigh      p  was walking  in  [place] last week when she lost her footing and fell onto her buttock.     states no  loc  or headstrike. states since then experiencing symptoms to above areas when she is moving - such as getting out of bed or sitting up.     she has been walking . no back pain, extremity weakness or numbness, chest pain, sob, headache, vision changes, abdominal pain, difficulties urinating.     states she has not taken her antihypertensives for several months because she no longer has a   dr         s/p  fall/ probable  syncope.,with  buttock pain.  since  he r fall,  has  improved         moisés.  given h/p  prior  syncopal events/  with severe  facial  trauma.  etc        had  ILR / Abott  in  2022  for  symptomatic bradycardia / card  f/p/ pt ha s not seen a  d r mikayla  very  long time         CT , no  fx       ct  head, no  bleed/  ct pelvis,  no  fx     HTN/  HLD         cozaar  now.  ha s not  taken he r meds  for  long time  obesity/  BMI 34   Depression        ha s not been on  her  meds  for  long time    h/o  prior    falls     prior  echo,  mod  TR      orthostatics .  negtaive   fall  risk     bp  meds  titrated    card following,   echo, normal  ef     PT  eval/    d/c  plans    dvt ppx       Hospital Course:  84F hx htn, obesity, anxiety here with coccyx, L buttock pain and L back of thigh pain since a fall 1 week ago. pt states she was walking at [employer] last week when she lost her footing and fell onto her buttock. states no LOC or headstrike. states since then experiencing symptoms to above areas when she is moving - such as getting out of bed or sitting up. she has been walking. no back pain, extremity weakness or numbness, chest pain, sob, headache, vision changes, abdominal pain, difficulties urinating.   pt with hx of htn with hx of syncope, s/p ILR never followed with ?fall ?Syncope.  PT was seen by cardio; pt not orthostatic; on losartan and ASA; echo noted with moderate AS    Important Medication Changes and Reason:  Paxil 10mg qd  Norvasc   Losartan     Active or Pending Issues Requiring Follow-up:  f/u with pcp and cardio    Advanced Directives:   [X] Full code  [ ] DNR  [ ] Hospice    Discharge Diagnoses:  Strain of left hip  HTN

## 2025-01-06 NOTE — DISCHARGE NOTE PROVIDER - NSFOLLOWUPCLINICS_GEN_ALL_ED_FT
[facility] - Primary Care  Primary Care  [address] [place] [address]  Phone: [phone]  Fax:   Follow Up Time: 1 week

## 2025-01-06 NOTE — DISCHARGE NOTE PROVIDER - CARE PROVIDER_API CALL
Lisa Pierce  Cardiovascular Disease  [address] [place] [id number]  Phone: [phone]  Fax: [phone]  Follow Up Time:

## 2025-01-06 NOTE — DISCHARGE NOTE PROVIDER - NSDCFUADDAPPT_GEN_ALL_CORE_FT
APPTS ARE READY TO BE MADE: [X] YES    Best Family or Patient Contact (if needed):    Additional Information about above appointments (if needed):    1: PCP  2: CARDIO  3:     Other comments or requests:    APPTS ARE READY TO BE MADE: [X] YES    Best Family or Patient Contact (if needed):    Additional Information about above appointments (if needed):    1: PCP  2: CARDIO  3:     Other comments or requests:     Patient is being discharged to [facility]. Caregiver will arrange follow up.

## 2025-01-06 NOTE — DISCHARGE NOTE PROVIDER - NSDCMRMEDTOKEN_GEN_ALL_CORE_FT
amlodipine-benazepril 5 mg-20 mg oral capsule: 1 cap(s) orally once a day  Lipitor 20 mg oral tablet: 1 tab(s) orally once a day  PARoxetine 20 mg oral tablet: 1 tab(s) orally once a day  Rolling walker : Rolling walker   traZODone 50 mg oral tablet: 1 tab(s) orally once a day (at bedtime)   acetaminophen 325 mg oral tablet: 2 tab(s) orally every 6 hours As needed Moderate Pain (4 - 6)  amlodipine-benazepril 5 mg-20 mg oral capsule: 1 cap(s) orally once a day  lidocaine 4% topical film: Apply topically to affected area once a day  Lipitor 20 mg oral tablet: 1 tab(s) orally once a day  losartan 100 mg oral tablet: 1 tab(s) orally once a day  PARoxetine 20 mg oral tablet: 1 tab(s) orally once a day  Rolling walker : Rolling walker   traZODone 50 mg oral tablet: 1 tab(s) orally once a day (at bedtime)   acetaminophen 325 mg oral tablet: 2 tab(s) orally every 6 hours As needed Moderate Pain (4 - 6)  amLODIPine 5 mg oral tablet: 1 tab(s) orally once a day  aspirin 81 mg oral delayed release tablet: 1 tab(s) orally once a day  lidocaine 4% topical film: Apply topically to affected area once a day  Lipitor 20 mg oral tablet: 1 tab(s) orally once a day  losartan 100 mg oral tablet: 1 tab(s) orally once a day  Paxil 10 mg oral tablet: 1 tab(s) orally once a day  traZODone 50 mg oral tablet: 1 tab(s) orally once a day (at bedtime)

## 2025-01-07 ENCOUNTER — TRANSCRIPTION ENCOUNTER (OUTPATIENT)
Age: 85
End: 2025-01-07

## 2025-01-07 VITALS
OXYGEN SATURATION: 95 % | SYSTOLIC BLOOD PRESSURE: 154 MMHG | RESPIRATION RATE: 18 BRPM | TEMPERATURE: 98 F | DIASTOLIC BLOOD PRESSURE: 77 MMHG | HEART RATE: 72 BPM

## 2025-01-07 LAB
ANION GAP SERPL CALC-SCNC: 12 MMOL/L — SIGNIFICANT CHANGE UP (ref 5–17)
BUN SERPL-MCNC: 18 MG/DL — SIGNIFICANT CHANGE UP (ref 7–23)
CALCIUM SERPL-MCNC: 9.7 MG/DL — SIGNIFICANT CHANGE UP (ref 8.4–10.5)
CHLORIDE SERPL-SCNC: 105 MMOL/L — SIGNIFICANT CHANGE UP (ref 96–108)
CO2 SERPL-SCNC: 20 MMOL/L — LOW (ref 22–31)
CREAT SERPL-MCNC: 0.51 MG/DL — SIGNIFICANT CHANGE UP (ref 0.5–1.3)
EGFR: 91 ML/MIN/1.73M2 — SIGNIFICANT CHANGE UP
GLUCOSE SERPL-MCNC: 100 MG/DL — HIGH (ref 70–99)
MAGNESIUM SERPL-MCNC: 2 MG/DL — SIGNIFICANT CHANGE UP (ref 1.6–2.6)
POTASSIUM SERPL-MCNC: 4.6 MMOL/L — SIGNIFICANT CHANGE UP (ref 3.5–5.3)
POTASSIUM SERPL-SCNC: 4.6 MMOL/L — SIGNIFICANT CHANGE UP (ref 3.5–5.3)
SODIUM SERPL-SCNC: 137 MMOL/L — SIGNIFICANT CHANGE UP (ref 135–145)

## 2025-01-07 PROCEDURE — 36415 COLL VENOUS BLD VENIPUNCTURE: CPT

## 2025-01-07 PROCEDURE — 85027 COMPLETE CBC AUTOMATED: CPT

## 2025-01-07 PROCEDURE — 85610 PROTHROMBIN TIME: CPT

## 2025-01-07 PROCEDURE — 80048 BASIC METABOLIC PNL TOTAL CA: CPT

## 2025-01-07 PROCEDURE — 97116 GAIT TRAINING THERAPY: CPT

## 2025-01-07 PROCEDURE — 85730 THROMBOPLASTIN TIME PARTIAL: CPT

## 2025-01-07 PROCEDURE — 96374 THER/PROPH/DIAG INJ IV PUSH: CPT

## 2025-01-07 PROCEDURE — 76377 3D RENDER W/INTRP POSTPROCES: CPT

## 2025-01-07 PROCEDURE — 99285 EMERGENCY DEPT VISIT HI MDM: CPT

## 2025-01-07 PROCEDURE — 82607 VITAMIN B-12: CPT

## 2025-01-07 PROCEDURE — 97161 PT EVAL LOW COMPLEX 20 MIN: CPT

## 2025-01-07 PROCEDURE — 72192 CT PELVIS W/O DYE: CPT | Mod: MC

## 2025-01-07 PROCEDURE — 85025 COMPLETE CBC W/AUTO DIFF WBC: CPT

## 2025-01-07 PROCEDURE — 80053 COMPREHEN METABOLIC PANEL: CPT

## 2025-01-07 PROCEDURE — 84443 ASSAY THYROID STIM HORMONE: CPT

## 2025-01-07 PROCEDURE — 82962 GLUCOSE BLOOD TEST: CPT

## 2025-01-07 PROCEDURE — 93306 TTE W/DOPPLER COMPLETE: CPT

## 2025-01-07 PROCEDURE — 97530 THERAPEUTIC ACTIVITIES: CPT

## 2025-01-07 PROCEDURE — 83735 ASSAY OF MAGNESIUM: CPT

## 2025-01-07 RX ORDER — LIDOCAINE 50 MG/G
1 OINTMENT TOPICAL
Qty: 0 | Refills: 0 | DISCHARGE
Start: 2025-01-07

## 2025-01-07 RX ORDER — ASPIRIN 81 MG
1 TABLET, DELAYED RELEASE (ENTERIC COATED) ORAL
Qty: 0 | Refills: 0 | DISCHARGE
Start: 2025-01-07

## 2025-01-07 RX ORDER — ACETAMINOPHEN 80 MG/.8ML
2 SOLUTION/ DROPS ORAL
Qty: 0 | Refills: 0 | DISCHARGE
Start: 2025-01-07

## 2025-01-07 RX ORDER — POTASSIUM CHLORIDE 600 MG/1
20 TABLET, FILM COATED, EXTENDED RELEASE ORAL
Refills: 0 | Status: DISCONTINUED | OUTPATIENT
Start: 2025-01-07 | End: 2025-01-07

## 2025-01-07 RX ORDER — LOSARTAN POTASSIUM 100 MG/1
1 TABLET, FILM COATED ORAL
Qty: 0 | Refills: 0 | DISCHARGE
Start: 2025-01-07

## 2025-01-07 RX ORDER — PAROXETINE HYDROCHLORIDE 20 MG/1
1 TABLET, FILM COATED ORAL
Qty: 30 | Refills: 0
Start: 2025-01-07 | End: 2025-02-05

## 2025-01-07 RX ADMIN — HEPARIN SODIUM 5000 UNIT(S): 1000 INJECTION, SOLUTION INTRAVENOUS; SUBCUTANEOUS at 13:05

## 2025-01-07 RX ADMIN — Medication 5 MILLIGRAM(S): at 05:24

## 2025-01-07 RX ADMIN — LOSARTAN POTASSIUM 100 MILLIGRAM(S): 100 TABLET, FILM COATED ORAL at 05:24

## 2025-01-07 RX ADMIN — LIDOCAINE 1 PATCH: 50 OINTMENT TOPICAL at 00:00

## 2025-01-07 RX ADMIN — LIDOCAINE 1 PATCH: 50 OINTMENT TOPICAL at 11:46

## 2025-01-07 RX ADMIN — HEPARIN SODIUM 5000 UNIT(S): 1000 INJECTION, SOLUTION INTRAVENOUS; SUBCUTANEOUS at 05:23

## 2025-01-07 RX ADMIN — Medication 81 MILLIGRAM(S): at 11:46

## 2025-01-07 NOTE — PROGRESS NOTE ADULT - ASSESSMENT
84F        hx   HTN,   obesity, anxiety.  h/o falls /  and   mlple  syncopal  events in past/ seen  by  ep/  had  ILR  in 2022,. obesity       s/p  fall a  wek  ago.  now   here with coccyx, L buttock pain and L back of thigh      p  was walking  in  [place] last week when she lost her footing and fell onto her buttock.     states no  loc  or headstrike. states since then experiencing symptoms to above areas when she is moving - such as getting out of bed or sitting up.     she has been walking . no back pain, extremity weakness or numbness, chest pain, sob, headache, vision changes, abdominal pain, difficulties urinating.     states she has not taken her antihypertensives for several months because she no longer has a   dr         s/p  fall/ probable  syncope.  /  here  with  buttock pain.  since  he r fall         moisés.  given h/p  prior  syncopal events/  with severe  facial  trauma.  etc        had  ILR / Abott  in  2022  for  symptomatic bradycardia / card  f/p/ pt ha s not seen a  d r mikayla  very  long time         CT , no  fx       ct  head, no  bleed/  ct pelvis,  no  fx     HTN/  HLD         cozaar  now.  ha s not  taken he r meds  for  long time  obesity/  BMI 34   Depression        ha s not been on  her  meds  for  long time    h/o  prior    falls     prior  echo,  mod  TR      orthostatics .  negtaive   fall  risk     bp  meds  pe t  card,  and   awiating  echo    PT  eval    dvt ppx        rad< from: CT 3D Reconstruct w/ Workstation (01.01.25 @ 19:33) >  DOMINAL WALL: Small fat-containing left inguinal hernia. Small right   inguinal hernia containing fat and a nonobstructed loop of small bowel.  BONES: No fracture or dislocation. Mild to moderate right hip   degenerative change. Degenerative changes of the visualized lower lumbar   spne, sacroiliac joints andpubic symphysis.  IMPRESSION:  No fracture or dislocation of the pelvis or either hip.  --- End of Report ---        <
84F        hx   HTN,   obesity, anxiety.  h/o falls /  and   mlple  syncopal  events in past/ seen  by  ep/  had  ILR  in 2022,. obesity       s/p  fall a  wek  ago.  now   here with coccyx, L buttock pain and L back of thigh      p  was walking  in  [place] last week when she lost her footing and fell onto her buttock.     states no  loc  or headstrike. states since then experiencing symptoms to above areas when she is moving - such as getting out of bed or sitting up.     she has been walking . no back pain, extremity weakness or numbness, chest pain, sob, headache, vision changes, abdominal pain, difficulties urinating.     states she has not taken her antihypertensives for several months because she no longer has a   dr         s/p  fall/ probable  syncope.  /  here  with  buttock pain.  since  he r fall         moisés.  given h/p  prior  syncopal events/  with severe  facial  trauma.  etc        had  ILR / Abott  in  2022  for  symptomatic bradycardia / card  f/p/ pt ha s not seen a  d r mikayla  very  long time         CT , no  fx       ct  head, no  bleed/  ct pelvis,  no  fx     HTN/  HLD         cozaar  now.  ha s not  taken he r meds  for  long time  obesity/  BMI 34   Depression        ha s not been on  her  meds  for  long time    h/o  prior    falls     prior  echo,  mod  TR      orthostatics .  negtaive   fall  risk     bp  meds  titarted,   card folwoing,   and   awiating  echo    PT  eval/ strat  d/c  plans    dvt ppx        rad< from: CT 3D Reconstruct w/ Workstation (01.01.25 @ 19:33) >  DOMINAL WALL: Small fat-containing left inguinal hernia. Small right   inguinal hernia containing fat and a nonobstructed loop of small bowel.  BONES: No fracture or dislocation. Mild to moderate right hip   degenerative change. Degenerative changes of the visualized lower lumbar   spne, sacroiliac joints andpubic symphysis.  IMPRESSION:  No fracture or dislocation of the pelvis or either hip.  --- End of Report ---        <
84F        hx   HTN,   obesity, anxiety.  h/o falls /  and   mlple  syncopal  events in past/ seen  by  ep/  had  ILR  in 2022,. obesity       s/p  fall a  wek  ago.  now   here with coccyx, L buttock pain and L back of thigh      p  was walking  in  [place] last week when she lost her footing and fell onto her buttock.     states no  loc  or headstrike. states since then experiencing symptoms to above areas when she is moving - such as getting out of bed or sitting up.     she has been walking . no back pain, extremity weakness or numbness, chest pain, sob, headache, vision changes, abdominal pain, difficulties urinating.     states she has not taken her antihypertensives for several months because she no longer has a   dr         s/p  fall/ probable  syncope.  /  here  with  buttock pain.  since  he r fall         moisés.  given h/p  prior  syncopal events/  with severe  facial  trauma.  etc        had  ILR / Abott  in  2022  for  symptomatic bradycardia / card  f/p/ pt ha s not seen a  d r mikayla  very  long time         CT , no  fx       ct  head, no  bleed/  ct pelvis,  no  fx     HTN/  HLD         cozaar  now.  ha s not  taken he r meds  for  long time  obesity/  BMI 34   Depression        ha s not been on  her  meds  for  long time    h/o  prior    falls     prior  echo,  mod  TR      orthostatics .  negtaive   fall  risk     bp  meds  titrated      echo,  normal  ef    PT  eval/     d/c  plans  to  rehab .  discussed  with team    dvt ppx        rad< from: CT 3D Reconstruct w/ Workstation (01.01.25 @ 19:33) >  DOMINAL WALL: Small fat-containing left inguinal hernia. Small right   inguinal hernia containing fat and a nonobstructed loop of small bowel.  BONES: No fracture or dislocation. Mild to moderate right hip   degenerative change. Degenerative changes of the visualized lower lumbar   spne, sacroiliac joints andpubic symphysis.  IMPRESSION:  No fracture or dislocation of the pelvis or either hip.  --- End of Report ---        <
84F        hx   HTN,   obesity, anxiety.  h/o falls /  and   mlple  syncopal  events in past/ seen  by  ep/  had  ILR  in 2022,. obesity       s/p  fall a  wek  ago.  now   here with coccyx, L buttock pain and L back of thigh      p  was walking  in  [place] last week when she lost her footing and fell onto her buttock.     states no  loc  or headstrike. states since then experiencing symptoms to above areas when she is moving - such as getting out of bed or sitting up.     she has been walking . no back pain, extremity weakness or numbness, chest pain, sob, headache, vision changes, abdominal pain, difficulties urinating.     states she has not taken her antihypertensives for several months because she no longer has a   dr         s/p  fall/ probable  syncope.  /  here  with  buttock pain.  since  he r fall         moisés.  given h/p  prior  syncopal events/  with severe  facial  trauma.  etc        had  ILR / Abott  in  2022  for  symptomatic bradycardia / card  f/p/ pt ha s not seen a  d r mikayla  very  long time         CT , no  fx       ct  head, no  bleed/  ct pelvis,  no  fx     HTN/  HLD         cozaar  now.  ha s not  taken he r meds  for  long time  obesity/  BMI 34   Depression        ha s not been on  her  meds  for  long time    h/o  prior    falls     prior  echo,  mod  TR      orthostatics .,  negtaive   fall  risk     bp  meds  pe r acrd    PT  eval    dvt ppx        rad< from: CT 3D Reconstruct w/ Workstation (01.01.25 @ 19:33) >  DOMINAL WALL: Small fat-containing left inguinal hernia. Small right   inguinal hernia containing fat and a nonobstructed loop of small bowel.  BONES: No fracture or dislocation. Mild to moderate right hip   degenerative change. Degenerative changes of the visualized lower lumbar   spne, sacroiliac joints andpubic symphysis.  IMPRESSION:  No fracture or dislocation of the pelvis or either hip.  --- End of Report ---        <
84F        hx   HTN,   obesity, anxiety.  h/o falls /  and   mlple  syncopal  events in past/ seen  by  ep/  had  ILR  in 2022,. obesity       s/p  fall a  wek  ago.  now   here with coccyx, L buttock pain and L back of thigh      p  was walking  in  [place] last week when she lost her footing and fell onto her buttock.     states no  loc  or headstrike. states since then experiencing symptoms to above areas when she is moving - such as getting out of bed or sitting up.     she has been walking . no back pain, extremity weakness or numbness, chest pain, sob, headache, vision changes, abdominal pain, difficulties urinating.     states she has not taken her antihypertensives for several months because she no longer has a   dr         s/p  fall/ probable  syncope.  /  here  with  buttock pain.  since  he r fall         moisés.  given h/p  prior  syncopal events/  with severe  facial  trauma.  etc        had  ILR / Abott  in  2022  for  symptomatic bradycardia / card  f/p/ pt ha s not seen a  d r mikayla  very  long time         CT , no  fx       ct  head, no  bleed/  ct pelvis,  no  fx     HTN/  HLD         cozaar  now.  ha s not  taken he r meds  for  long time  obesity/  BMI 34   Depression        ha s not been on  her  meds  for  long time    h/o  prior    falls     prior  echo,  mod  TR      orthostatics .  negtaive   fall  risk     bp  meds  titrated    card folwoing,   and   awaiting   echo    PT  eval/   may start   d/c  plans/ discussed  with team    dvt ppx        rad< from: CT 3D Reconstruct w/ Workstation (01.01.25 @ 19:33) >  DOMINAL WALL: Small fat-containing left inguinal hernia. Small right   inguinal hernia containing fat and a nonobstructed loop of small bowel.  BONES: No fracture or dislocation. Mild to moderate right hip   degenerative change. Degenerative changes of the visualized lower lumbar   spne, sacroiliac joints andpubic symphysis.  IMPRESSION:  No fracture or dislocation of the pelvis or either hip.  --- End of Report ---        <

## 2025-01-07 NOTE — PROGRESS NOTE ADULT - PROVIDER SPECIALTY LIST ADULT
Cardiology
Cardiology
Internal Medicine
Cardiology
Internal Medicine
Cardiology
Cardiology
Internal Medicine

## 2025-01-07 NOTE — DISCHARGE NOTE NURSING/CASE MANAGEMENT/SOCIAL WORK - NSDCVIVACCINE_GEN_ALL_CORE_FT
Tdap; 04-Jun-2017 15:30; Sierra Treadwell (RN); [employer]; [id number]; IntraMuscular; Dorsogluteal Left.; 0.5 milliLiter(s); VIS (VIS Published: 09-May-2013, VIS Presented: 04-Jun-2017);   Tdap; 22-Mar-2023 16:26; Tamar Barahona); [employer]; [id number] (Exp. Date: 29-Nov-2024); IntraMuscular; Deltoid Right.; 0.5 milliLiter(s); VIS (VIS Published: 09-May-2013, VIS Presented: 22-Mar-2023);

## 2025-01-07 NOTE — PROGRESS NOTE ADULT - SUBJECTIVE AND OBJECTIVE BOX
Date of Service: 01-07-25 @ 06:48           CARDIOLOGY     PROGRESS  NOTE   ________________________________________________    CHIEF COMPLAINT:Patient is a 84y old  Female who presents with a chief complaint of fall (06 Jan 2025 20:48)  no complain  	  REVIEW OF SYSTEMS:  CONSTITUTIONAL: No fever, weight loss, or fatigue  EYES: No eye pain, visual disturbances, or discharge  ENT:  No difficulty hearing, tinnitus, vertigo; No sinus or throat pain  NECK: No pain or stiffness  RESPIRATORY: No cough, wheezing, chills or hemoptysis; No Shortness of Breath  CARDIOVASCULAR: No chest pain, palpitations, passing out, dizziness, or leg swelling  GASTROINTESTINAL: No abdominal or epigastric pain. No nausea, vomiting, or hematemesis; No diarrhea or constipation. No melena or hematochezia.  GENITOURINARY: No dysuria, frequency, hematuria, or incontinence  NEUROLOGICAL: No headaches, memory loss, loss of strength, numbness, or tremors  SKIN: No itching, burning, rashes, or lesions   LYMPH Nodes: No enlarged glands  ENDOCRINE: No heat or cold intolerance; No hair loss  MUSCULOSKELETAL: No joint pain or swelling; No muscle, back, or extremity pain  PSYCHIATRIC: No depression, anxiety, mood swings, or difficulty sleeping  HEME/LYMPH: No easy bruising, or bleeding gums  ALLERGY AND IMMUNOLOGIC: No hives or eczema	    x[ ] All others negative	  [ ] Unable to obtain    PHYSICAL EXAM:  T(C): 36.8 (01-07-25 @ 05:58), Max: 37 (01-06-25 @ 09:23)  HR: 79 (01-07-25 @ 05:58) (74 - 87)  BP: 145/74 (01-07-25 @ 05:58) (142/77 - 170/79)  RR: 18 (01-07-25 @ 05:58) (18 - 20)  SpO2: 98% (01-07-25 @ 05:58) (93% - 98%)  Wt(kg): --  I&O's Summary    05 Jan 2025 07:01  -  06 Jan 2025 07:00  --------------------------------------------------------  IN: 0 mL / OUT: 700 mL / NET: -700 mL    06 Jan 2025 07:01  -  07 Jan 2025 06:48  --------------------------------------------------------  IN: 660 mL / OUT: 500 mL / NET: 160 mL        Appearance: Normal	  HEENT:   Normal oral mucosa, PERRL, EOMI	  Lymphatic: No lymphadenopathy  Cardiovascular: Normal S1 S2, No JVD, + murmurs, No edema  Respiratory:rhonchi  Psychiatry: A & O x 3, Mood & affect appropriate  Gastrointestinal:  Soft, Non-tender, + BS	  Skin: No rashes, No ecchymoses, No cyanosis	  Neurologic: Non-focal  Extremities: Normal range of motion, No clubbing, cyanosis or edema  Vascular: Peripheral pulses palpable 2+ bilaterally    MEDICATIONS  (STANDING):  amLODIPine   Tablet 5 milliGRAM(s) Oral daily  aspirin enteric coated 81 milliGRAM(s) Oral daily  atorvastatin 20 milliGRAM(s) Oral at bedtime  heparin   Injectable 5000 Unit(s) SubCutaneous every 8 hours  lidocaine   4% Patch 1 Patch Transdermal daily  losartan 100 milliGRAM(s) Oral daily      TELEMETRY: 	    ECG:  	  RADIOLOGY:  OTHER: 	  	  LABS:	 	    CARDIAC MARKERS:            01-06    137  |  101  |  15  ----------------------------<  115[H]  3.5   |  22  |  0.46[L]    Ca    9.3      06 Jan 2025 06:02      proBNP:   Lipid Profile:   HgA1c:   TSH: Thyroid Stimulating Hormone, Serum: 0.54 uIU/mL (01-03 @ 06:56)      < from: TTE W or WO Ultrasound Enhancing Agent (01.06.25 @ 09:52) >   1. Left ventricular systolic function is normal with an ejection fraction of 65 % by Parada's method of disks. There are no regional wall motion abnormalities seen.   2. Moderate aortic stenosis.          Assessment and plan  ---------------------------   84F hx htn, obesity, anxiety here with coccyx, L buttock pain and L back of thigh pain since a fall 1 week ago. pt states she was walking at [employer] last week when she lost her footing and fell onto her buttock. states no LOC or headstrike. states since then experiencing symptoms to above areas when she is moving - such as getting out of bed or sitting up. she has been walking. no back pain, extremity weakness or numbness, chest pain, sob, headache, vision changes, abdominal pain, difficulties urinating. states she has not taken her antihypertensives for several months because she no longer has a PCP. she does not have her phone and is not sure what meds she is supposed to take,  pt with hx of htn with hx of syncope, s/p ILR never followed with ?fall ?Syncope  will check ILR   repeat echo , pt with hx of AS  check orthostatic  observe on tele  tsh/ b12 level  dvt prophylaxis  tele, awaiting echo, orthostatic  pt not orthostatic , will increase losartan dose for bp control  still awaiting echo with sig abnormal ecg and hx of AS  physical therapy  asa daily  high risk of falls ?MARY upon dc  still awaiting orthostatic bp and HR to adjust bp meds, not done  echo noted with moderate AS    	        
  date of service: 01-03-25 @ 08:39  [facility]  REVIEW OF SYSTEMS:  CONSTITUTIONAL: No fever,  no  weight loss  ENT:  No  tinnitus,   no   vertigo  NECK: No pain or stiffness  RESPIRATORY: No cough, wheezing, chills or hemoptysis;    No Shortness of Breath  CARDIOVASCULAR: No chest pain, palpitations, dizziness  GASTROINTESTINAL: No abdominal or epigastric pain. No nausea, vomiting, or hematemesis; No diarrhea  No melena or hematochezia.  GENITOURINARY: No dysuria, frequency, hematuria, or incontinence  NEUROLOGICAL: No headaches  SKIN: No itching,  no   rash  LYMPH Nodes: No enlarged glands  ENDOCRINE: No heat or cold intolerance  MUSCULOSKELETAL: No joint pain or swelling  PSYCHIATRIC: No depression, anxiety  HEME/LYMPH: No easy bruising, or bleeding gums  ALLERGY AND IMMUNOLOGIC: No hives or eczema	    MEDICATIONS  (STANDING):  atorvastatin 20 milliGRAM(s) Oral at bedtime  heparin   Injectable 5000 Unit(s) SubCutaneous every 8 hours  losartan 25 milliGRAM(s) Oral daily    MEDICATIONS  (PRN):      Vital Signs Last 24 Hrs  T(C): 36.7 (03 Jan 2025 05:32), Max: 37 (02 Jan 2025 20:51)  T(F): 98 (03 Jan 2025 05:32), Max: 98.6 (02 Jan 2025 20:51)  HR: 73 (03 Jan 2025 05:32) (73 - 81)  BP: 170/92 (03 Jan 2025 05:32) (153/89 - 188/92)  BP(mean): --  RR: 18 (03 Jan 2025 05:32) (18 - 18)  SpO2: 98% (03 Jan 2025 05:32) (92% - 98%)    Parameters below as of 03 Jan 2025 05:32  Patient On (Oxygen Delivery Method): room air      CAPILLARY BLOOD GLUCOSE        I&O's Summary        Appearance: Normal	  HEENT:   Normal oral mucosa, PERRL, EOMI	  Lymphatic: No lymphadenopathy  Cardiovascular: Normal S1 S2, No JVD  Respiratory: Lungs clear to auscultation	  Gastrointestinal:  Soft, Non-tender, + BS	  Skin: No rash, No ecchymoses	  Extremities:     LABS:                        12.7   5.66  )-----------( 264      ( 03 Jan 2025 06:53 )             40.3     01-03    141  |  105  |  20  ----------------------------<  90  3.7   |  23  |  0.66    Ca    9.6      03 Jan 2025 06:54    TPro  8.1  /  Alb  4.7  /  TBili  0.4  /  DBili  x   /  AST  24  /  ALT  16  /  AlkPhos  90  01-01    PT/INR - ( 01 Jan 2025 23:03 )   PT: 11.6 sec;   INR: 1.01 ratio         PTT - ( 01 Jan 2025 23:03 )  PTT:29.9 sec      Urinalysis Basic - ( 03 Jan 2025 06:54 )    Color: x / Appearance: x / SG: x / pH: x  Gluc: 90 mg/dL / Ketone: x  / Bili: x / Urobili: x   Blood: x / Protein: x / Nitrite: x   Leuk Esterase: x / RBC: x / WBC x   Sq Epi: x / Non Sq Epi: x / Bacteria: x                      Consultant(s) Notes Reviewed:      Care Discussed with Consultants/Other Providers:    
  date of service: 01-04-25 @ 07:52  Merged with [facility]  REVIEW OF SYSTEMS:  CONSTITUTIONAL: No fever,  no  weight loss  ENT:  No  tinnitus,   no   vertigo  NECK: No pain or stiffness  RESPIRATORY: No cough, wheezing, chills or hemoptysis;    No Shortness of Breath  CARDIOVASCULAR: No chest pain, palpitations, dizziness  GASTROINTESTINAL: No abdominal or epigastric pain. No nausea, vomiting, or hematemesis; No diarrhea  No melena or hematochezia.  GENITOURINARY: No dysuria, frequency, hematuria, or incontinence  NEUROLOGICAL: No headaches  SKIN: No itching,  no   rash  LYMPH Nodes: No enlarged glands  ENDOCRINE: No heat or cold intolerance  MUSCULOSKELETAL: No joint pain or swelling  PSYCHIATRIC: No depression, anxiety  HEME/LYMPH: No easy bruising, or bleeding gums  ALLERGY AND IMMUNOLOGIC: No hives or eczema	    MEDICATIONS  (STANDING):  atorvastatin 20 milliGRAM(s) Oral at bedtime  heparin   Injectable 5000 Unit(s) SubCutaneous every 8 hours  lidocaine   4% Patch 1 Patch Transdermal daily  losartan 25 milliGRAM(s) Oral daily    MEDICATIONS  (PRN):  acetaminophen     Tablet .. 650 milliGRAM(s) Oral every 6 hours PRN Moderate Pain (4 - 6)      Vital Signs Last 24 Hrs  T(C): 36.8 (04 Jan 2025 04:55), Max: 36.8 (03 Jan 2025 12:56)  T(F): 98.2 (04 Jan 2025 04:55), Max: 98.2 (03 Jan 2025 12:56)  HR: 77 (04 Jan 2025 04:55) (72 - 80)  BP: 174/102 (04 Jan 2025 04:55) (148/80 - 174/102)  BP(mean): --  RR: 18 (04 Jan 2025 04:55) (18 - 18)  SpO2: 96% (04 Jan 2025 04:55) (94% - 96%)    Parameters below as of 04 Jan 2025 04:55  Patient On (Oxygen Delivery Method): room air      CAPILLARY BLOOD GLUCOSE        I&O's Summary    03 Jan 2025 07:01  -  04 Jan 2025 07:00  --------------------------------------------------------  IN: 720 mL / OUT: 0 mL / NET: 720 mL          Appearance: Normal	  HEENT:   Normal oral mucosa, PERRL, EOMI	  Lymphatic: No lymphadenopathy  Cardiovascular: Normal S1 S2, No JVD  Respiratory: Lungs clear to auscultation	  Gastrointestinal:  Soft, Non-tender, + BS	  Skin: No rash, No ecchymoses	  Extremities:     LABS:                        12.7   5.66  )-----------( 264      ( 03 Jan 2025 06:53 )             40.3     01-03    141  |  105  |  20  ----------------------------<  90  3.7   |  23  |  0.66    Ca    9.6      03 Jan 2025 06:54            Urinalysis Basic - ( 03 Jan 2025 06:54 )    Color: x / Appearance: x / SG: x / pH: x  Gluc: 90 mg/dL / Ketone: x  / Bili: x / Urobili: x   Blood: x / Protein: x / Nitrite: x   Leuk Esterase: x / RBC: x / WBC x   Sq Epi: x / Non Sq Epi: x / Bacteria: x              Thyroid Stimulating Hormone, Serum: 0.54 uIU/mL (01-03 @ 06:56)          Consultant(s) Notes Reviewed:      Care Discussed with Consultants/Other Providers:    
  date of service: 01-05-25 @ 09:58  [facility]  REVIEW OF SYSTEMS:  CONSTITUTIONAL: No fever,  no  weight loss  ENT:  No  tinnitus,   no   vertigo  NECK: No pain or stiffness  RESPIRATORY: No cough, wheezing, chills or hemoptysis;    No Shortness of Breath  CARDIOVASCULAR: No chest pain, palpitations, dizziness  GASTROINTESTINAL: No abdominal or epigastric pain. No nausea, vomiting, or hematemesis; No diarrhea  No melena or hematochezia.  GENITOURINARY: No dysuria, frequency, hematuria, or incontinence  NEUROLOGICAL: No headaches  SKIN: No itching,  no   rash  LYMPH Nodes: No enlarged glands  ENDOCRINE: No heat or cold intolerance  MUSCULOSKELETAL: No joint pain or swelling  PSYCHIATRIC: No depression, anxiety  HEME/LYMPH: No easy bruising, or bleeding gums  ALLERGY AND IMMUNOLOGIC: No hives or eczema	    MEDICATIONS  (STANDING):  amLODIPine   Tablet 2.5 milliGRAM(s) Oral daily  atorvastatin 20 milliGRAM(s) Oral at bedtime  heparin   Injectable 5000 Unit(s) SubCutaneous every 8 hours  lidocaine   4% Patch 1 Patch Transdermal daily  losartan 100 milliGRAM(s) Oral daily    MEDICATIONS  (PRN):  acetaminophen     Tablet .. 650 milliGRAM(s) Oral every 6 hours PRN Moderate Pain (4 - 6)      Vital Signs Last 24 Hrs  T(C): 36.7 (05 Jan 2025 09:27), Max: 37.3 (04 Jan 2025 20:38)  T(F): 98 (05 Jan 2025 09:27), Max: 99.1 (04 Jan 2025 20:38)  HR: 70 (05 Jan 2025 09:27) (70 - 88)  BP: 158/80 (05 Jan 2025 09:27) (158/80 - 177/95)  BP(mean): --  RR: 20 (05 Jan 2025 09:27) (18 - 20)  SpO2: 94% (05 Jan 2025 09:27) (92% - 94%)    Parameters below as of 05 Jan 2025 09:27  Patient On (Oxygen Delivery Method): room air      CAPILLARY BLOOD GLUCOSE        I&O's Summary    04 Jan 2025 07:01  -  05 Jan 2025 07:00  --------------------------------------------------------  IN: 840 mL / OUT: 1050 mL / NET: -210 mL          Appearance: Normal	  HEENT:   Normal oral mucosa, PERRL, EOMI	  Lymphatic: No lymphadenopathy  Cardiovascular: Normal S1 S2, No JVD  Respiratory: Lungs clear to auscultation	  Gastrointestinal:  Soft, Non-tender, + BS	  Skin: No rash, No ecchymoses	  Extremities:     LABS:                          Thyroid Stimulating Hormone, Serum: 0.54 uIU/mL (01-03 @ 06:56)          Consultant(s) Notes Reviewed:      Care Discussed with Consultants/Other Providers:    
  date of service: 01-06-25 @ 09:07  Located within [facility]  REVIEW OF SYSTEMS:  CONSTITUTIONAL: No fever,  no  weight loss  ENT:  No  tinnitus,   no   vertigo  NECK: No pain or stiffness  RESPIRATORY: No cough, wheezing, chills or hemoptysis;    No Shortness of Breath  CARDIOVASCULAR: No chest pain, palpitations, dizziness  GASTROINTESTINAL: No abdominal or epigastric pain. No nausea, vomiting, or hematemesis; No diarrhea  No melena or hematochezia.  GENITOURINARY: No dysuria, frequency, hematuria, or incontinence  NEUROLOGICAL: No headaches  SKIN: No itching,  no   rash  LYMPH Nodes: No enlarged glands  ENDOCRINE: No heat or cold intolerance  MUSCULOSKELETAL: No joint pain or swelling  PSYCHIATRIC: No depression, anxiety  HEME/LYMPH: No easy bruising, or bleeding gums  ALLERGY AND IMMUNOLOGIC: No hives or eczema	    MEDICATIONS  (STANDING):  amLODIPine   Tablet 5 milliGRAM(s) Oral daily  aspirin enteric coated 81 milliGRAM(s) Oral daily  atorvastatin 20 milliGRAM(s) Oral at bedtime  heparin   Injectable 5000 Unit(s) SubCutaneous every 8 hours  lidocaine   4% Patch 1 Patch Transdermal daily  losartan 100 milliGRAM(s) Oral daily    MEDICATIONS  (PRN):  acetaminophen     Tablet .. 650 milliGRAM(s) Oral every 6 hours PRN Moderate Pain (4 - 6)      Vital Signs Last 24 Hrs  T(C): 37.2 (06 Jan 2025 04:58), Max: 37.2 (06 Jan 2025 04:58)  T(F): 98.9 (06 Jan 2025 04:58), Max: 98.9 (06 Jan 2025 04:58)  HR: 75 (06 Jan 2025 04:58) (70 - 91)  BP: 163/80 (06 Jan 2025 04:58) (129/71 - 176/75)  BP(mean): --  RR: 20 (06 Jan 2025 04:58) (20 - 20)  SpO2: 94% (06 Jan 2025 04:58) (92% - 94%)    Parameters below as of 06 Jan 2025 04:58  Patient On (Oxygen Delivery Method): room air      CAPILLARY BLOOD GLUCOSE      POCT Blood Glucose.: 112 mg/dL (06 Jan 2025 05:31)    I&O's Summary    05 Jan 2025 07:01  -  06 Jan 2025 07:00  --------------------------------------------------------  IN: 0 mL / OUT: 700 mL / NET: -700 mL          Appearance: Normal	  HEENT:   Normal oral mucosa, PERRL, EOMI	  Lymphatic: No lymphadenopathy  Cardiovascular: Normal S1 S2, No JVD  Respiratory: Lungs clear to auscultation	  Gastrointestinal:  Soft, Non-tender, + BS	  Skin: No rash, No ecchymoses	  Extremities:     LABS:    01-06    137  |  101  |  15  ----------------------------<  115[H]  3.5   |  22  |  0.46[L]    Ca    9.3      06 Jan 2025 06:02            Urinalysis Basic - ( 06 Jan 2025 06:02 )    Color: x / Appearance: x / SG: x / pH: x  Gluc: 115 mg/dL / Ketone: x  / Bili: x / Urobili: x   Blood: x / Protein: x / Nitrite: x   Leuk Esterase: x / RBC: x / WBC x   Sq Epi: x / Non Sq Epi: x / Bacteria: x              Thyroid Stimulating Hormone, Serum: 0.54 uIU/mL (01-03 @ 06:56)          Consultant(s) Notes Reviewed:      Care Discussed with Consultants/Other Providers:    
Date of Service: 01-05-25 @ 10:34           CARDIOLOGY     PROGRESS  NOTE   ________________________________________________    CHIEF COMPLAINT:Patient is a 84y old  Female who presents with a chief complaint of fall (05 Jan 2025 09:57)  comfortable  	  REVIEW OF SYSTEMS:  CONSTITUTIONAL: No fever, weight loss, or fatigue  EYES: No eye pain, visual disturbances, or discharge  ENT:  No difficulty hearing, tinnitus, vertigo; No sinus or throat pain  NECK: No pain or stiffness  RESPIRATORY: No cough, wheezing, chills or hemoptysis; No Shortness of Breath  CARDIOVASCULAR: No chest pain, palpitations, passing out, dizziness, or leg swelling  GASTROINTESTINAL: No abdominal or epigastric pain. No nausea, vomiting, or hematemesis; No diarrhea or constipation. No melena or hematochezia.  GENITOURINARY: No dysuria, frequency, hematuria, or incontinence  NEUROLOGICAL: No headaches, memory loss, loss of strength, numbness, or tremors  SKIN: No itching, burning, rashes, or lesions   LYMPH Nodes: No enlarged glands  ENDOCRINE: No heat or cold intolerance; No hair loss  MUSCULOSKELETAL: No joint pain or swelling; No muscle, back, or extremity pain  PSYCHIATRIC: No depression, anxiety, mood swings, or difficulty sleeping  HEME/LYMPH: No easy bruising, or bleeding gums  ALLERGY AND IMMUNOLOGIC: No hives or eczema	    [x ] All others negative	  [ ] Unable to obtain    PHYSICAL EXAM:  T(C): 36.7 (01-05-25 @ 09:27), Max: 37.3 (01-04-25 @ 20:38)  HR: 70 (01-05-25 @ 09:27) (70 - 88)  BP: 158/80 (01-05-25 @ 09:27) (158/80 - 177/95)  RR: 20 (01-05-25 @ 09:27) (18 - 20)  SpO2: 94% (01-05-25 @ 09:27) (92% - 94%)  Wt(kg): --  I&O's Summary    04 Jan 2025 07:01  -  05 Jan 2025 07:00  --------------------------------------------------------  IN: 840 mL / OUT: 1050 mL / NET: -210 mL        Appearance: Normal	  HEENT:   Normal oral mucosa, PERRL, EOMI	  Lymphatic: No lymphadenopathy  Cardiovascular: Normal S1 S2, No JVD, + murmurs, No edema  Respiratory: rhonchi  Gastrointestinal:  Soft, Non-tender, + BS	  Skin: No rashes, No ecchymoses, No cyanosis	  Neurologic: can not assess  Extremities: Normal range of motion, No clubbing, cyanosis or edema  Vascular: Peripheral pulses palpable 2+ bilaterally    MEDICATIONS  (STANDING):  amLODIPine   Tablet 2.5 milliGRAM(s) Oral daily  atorvastatin 20 milliGRAM(s) Oral at bedtime  heparin   Injectable 5000 Unit(s) SubCutaneous every 8 hours  lidocaine   4% Patch 1 Patch Transdermal daily  losartan 100 milliGRAM(s) Oral daily      TELEMETRY: 	    ECG:  	  RADIOLOGY:  OTHER: 	  	  LABS:	 	    CARDIAC MARKERS:      proBNP:   Lipid Profile:   HgA1c:   TSH: Thyroid Stimulating Hormone, Serum: 0.54 uIU/mL (01-03 @ 06:56)    < from: 12 Lead ECG (03.22.23 @ 16:06) >    Diagnosis Line SINUS RHYTHM WITH 1ST DEGREE A-V BLOCK WITH OCCASIONAL PREMATURE VENTRICULAR COMPLEXES  LEFT VENTRICULAR HYPERTROPHY WITH REPOLARIZATION ABNORMALITY  LEFT ATRIAL ENLARGEMENT  INFERIOR INFARCT , AGE UNDETERMINED  NONSPECIFIC ST AND T WAVE ABNORMALITY  CONSIDER ANTERIOR MI  ABNORMAL ECG  WHEN COMPARED WITH ECG OF 11-JAN-2022 07:23,  PREMATURE BEATS NEW; RATE FASTER; INFERIOR MI NEW    Assessment and plan  ---------------------------   84F hx htn, obesity, anxiety here with coccyx, L buttock pain and L back of thigh pain since a fall 1 week ago. pt states she was walking at [employer] last week when she lost her footing and fell onto her buttock. states no LOC or headstrike. states since then experiencing symptoms to above areas when she is moving - such as getting out of bed or sitting up. she has been walking. no back pain, extremity weakness or numbness, chest pain, sob, headache, vision changes, abdominal pain, difficulties urinating. states she has not taken her antihypertensives for several months because she no longer has a PCP. she does not have her phone and is not sure what meds she is supposed to take,  pt with hx of htn with hx of syncope, s/p ILR never followed with ?fall ?Syncope  will check ILR   repeat echo , pt with hx of AS  check orthostatic  observe on tele  tsh/ b12 level  dvt prophylaxis  tele, awaiting echo, orthostatic  pt not orthostatic , will increase losartan dose for bp control  still awaiting echo with sig abnormal ecg and hx of AS  physical therapy  asa daily  high risk of falls ?MARY upon dc    	        
Date of Service: 01-03-25 @ 09:53           CARDIOLOGY     PROGRESS  NOTE   ________________________________________________    CHIEF COMPLAINT:Patient is a 84y old  Female who presents with a chief complaint of fall (03 Jan 2025 08:39)  no complain  	  REVIEW OF SYSTEMS:  CONSTITUTIONAL: No fever, weight loss, or fatigue  EYES: No eye pain, visual disturbances, or discharge  ENT:  No difficulty hearing, tinnitus, vertigo; No sinus or throat pain  NECK: No pain or stiffness  RESPIRATORY: No cough, wheezing, chills or hemoptysis; No Shortness of Breath  CARDIOVASCULAR: No chest pain, palpitations, passing out, dizziness, or leg swelling  GASTROINTESTINAL: No abdominal or epigastric pain. No nausea, vomiting, or hematemesis; No diarrhea or constipation. No melena or hematochezia.  GENITOURINARY: No dysuria, frequency, hematuria, or incontinence  NEUROLOGICAL: No headaches, memory loss, loss of strength, numbness, or tremors  SKIN: No itching, burning, rashes, or lesions   LYMPH Nodes: No enlarged glands  ENDOCRINE: No heat or cold intolerance; No hair loss  MUSCULOSKELETAL: No joint pain or swelling; No muscle, back, or extremity pain  PSYCHIATRIC: No depression, anxiety, mood swings, or difficulty sleeping  HEME/LYMPH: No easy bruising, or bleeding gums  ALLERGY AND IMMUNOLOGIC: No hives or eczema	    [x ] All others negative	  [ ] Unable to obtain    PHYSICAL EXAM:  T(C): 36.4 (01-03-25 @ 08:56), Max: 37 (01-02-25 @ 20:51)  HR: 72 (01-03-25 @ 08:56) (72 - 81)  BP: 148/80 (01-03-25 @ 08:56) (148/80 - 188/92)  RR: 18 (01-03-25 @ 08:56) (18 - 18)  SpO2: 94% (01-03-25 @ 08:56) (92% - 98%)  Wt(kg): --  I&O's Summary      Appearance: Normal	  HEENT:   Normal oral mucosa, PERRL, EOMI	  Lymphatic: No lymphadenopathy  Cardiovascular: Normal S1 S2, No JVD,+murmurs, No edema  Respiratory: rhonchi  Psychiatry: A & O x 3, Mood & affect appropriate  Gastrointestinal:  Soft, Non-tender, + BS	  Skin: No rashes, No ecchymoses, No cyanosis	  Neurologic: Non-focal  Extremities: Normal range of motion, No clubbing, cyanosis or edema  Vascular: Peripheral pulses palpable 2+ bilaterally    MEDICATIONS  (STANDING):  atorvastatin 20 milliGRAM(s) Oral at bedtime  heparin   Injectable 5000 Unit(s) SubCutaneous every 8 hours  losartan 25 milliGRAM(s) Oral daily      TELEMETRY: 	    ECG:  	  RADIOLOGY:  OTHER: 	  	  LABS:	 	    CARDIAC MARKERS:                          12.7   5.66  )-----------( 264      ( 03 Jan 2025 06:53 )             40.3     01-03    141  |  105  |  20  ----------------------------<  90  3.7   |  23  |  0.66    Ca    9.6      03 Jan 2025 06:54    TPro  8.1  /  Alb  4.7  /  TBili  0.4  /  DBili  x   /  AST  24  /  ALT  16  /  AlkPhos  90  01-01    proBNP:   Lipid Profile:   HgA1c:   TSH:   PT/INR - ( 01 Jan 2025 23:03 )   PT: 11.6 sec;   INR: 1.01 ratio         PTT - ( 01 Jan 2025 23:03 )  PTT:29.9 sec      Assessment and plan  ---------------------------   84F hx htn, obesity, anxiety here with coccyx, L buttock pain and L back of thigh pain since a fall 1 week ago. pt states she was walking at [email or web address] last week when she lost her footing and fell onto her buttock. states no LOC or headstrike. states since then experiencing symptoms to above areas when she is moving - such as getting out of bed or sitting up. she has been walking. no back pain, extremity weakness or numbness, chest pain, sob, headache, vision changes, abdominal pain, difficulties urinating. states she has not taken her antihypertensives for several months because she no longer has a PCP. she does not have her phone and is not sure what meds she is supposed to take,  pt with hx of htn with hx of syncope, s/p ILR never followed with ?fall ?Syncope  will check ILR   repeat echo , pt with hx of AS  check orthostatic  observe on tele  tsh/ b12 level  dvt prophylaxis  tele, awaiting echo, orthostatic    	        
Date of Service: 01-04-25 @ 09:52           CARDIOLOGY     PROGRESS  NOTE   ________________________________________________    CHIEF COMPLAINT:Patient is a 84y old  Female who presents with a chief complaint of fall (04 Jan 2025 07:52)  no complain  	  REVIEW OF SYSTEMS:  CONSTITUTIONAL: No fever, weight loss, or fatigue  EYES: No eye pain, visual disturbances, or discharge  ENT:  No difficulty hearing, tinnitus, vertigo; No sinus or throat pain  NECK: No pain or stiffness  RESPIRATORY: No cough, wheezing, chills or hemoptysis; No Shortness of Breath  CARDIOVASCULAR: No chest pain, palpitations, passing out, dizziness, or leg swelling  GASTROINTESTINAL: No abdominal or epigastric pain. No nausea, vomiting, or hematemesis; No diarrhea or constipation. No melena or hematochezia.  GENITOURINARY: No dysuria, frequency, hematuria, or incontinence  NEUROLOGICAL: No headaches, memory loss, loss of strength, numbness, or tremors  SKIN: No itching, burning, rashes, or lesions   LYMPH Nodes: No enlarged glands  ENDOCRINE: No heat or cold intolerance; No hair loss  MUSCULOSKELETAL: No joint pain or swelling; No muscle, back, or extremity pain  PSYCHIATRIC: No depression, anxiety, mood swings, or difficulty sleeping  HEME/LYMPH: No easy bruising, or bleeding gums  ALLERGY AND IMMUNOLOGIC: No hives or eczema	    [x ] All others negative	  [ ] Unable to obtain    PHYSICAL EXAM:  T(C): 36.8 (01-04-25 @ 04:55), Max: 36.8 (01-03-25 @ 12:56)  HR: 77 (01-04-25 @ 04:55) (77 - 80)  BP: 174/102 (01-04-25 @ 04:55) (167/79 - 174/102)  RR: 18 (01-04-25 @ 04:55) (18 - 18)  SpO2: 96% (01-04-25 @ 04:55) (94% - 96%)  Wt(kg): --  I&O's Summary    03 Jan 2025 07:01  -  04 Jan 2025 07:00  --------------------------------------------------------  IN: 720 mL / OUT: 0 mL / NET: 720 mL        Appearance: Normal	  HEENT:   Normal oral mucosa, PERRL, EOMI	  Lymphatic: No lymphadenopathy  Cardiovascular: Normal S1 S2, No JVD, + murmurs, No edema  Respiratory: rhonchi  Psychiatry: A & O x 3, Mood & affect appropriate  Gastrointestinal:  Soft, Non-tender, + BS	  Skin: No rashes, No ecchymoses, No cyanosis	  Neurologic: Non-focal  Extremities: Normal range of motion, No clubbing, cyanosis or edema  Vascular: Peripheral pulses palpable 2+ bilaterally    MEDICATIONS  (STANDING):  atorvastatin 20 milliGRAM(s) Oral at bedtime  heparin   Injectable 5000 Unit(s) SubCutaneous every 8 hours  lidocaine   4% Patch 1 Patch Transdermal daily  losartan 25 milliGRAM(s) Oral daily      TELEMETRY: 	    ECG:  	  RADIOLOGY:  OTHER: 	  	  LABS:	 	    CARDIAC MARKERS:                                12.7   5.66  )-----------( 264      ( 03 Jan 2025 06:53 )             40.3     01-03    141  |  105  |  20  ----------------------------<  90  3.7   |  23  |  0.66    Ca    9.6      03 Jan 2025 06:54      proBNP:   Lipid Profile:   HgA1c:   TSH: Thyroid Stimulating Hormone, Serum: 0.54 uIU/mL (01-03 @ 06:56)    Assessment and plan  ---------------------------   84F hx htn, obesity, anxiety here with coccyx, L buttock pain and L back of thigh pain since a fall 1 week ago. pt states she was walking [place] [employer] last week when she lost her footing and fell onto her buttock. states no LOC or headstrike. states since then experiencing symptoms to above areas when she is moving - such as getting out of bed or sitting up. she has been walking. no back pain, extremity weakness or numbness, chest pain, sob, headache, vision changes, abdominal pain, difficulties urinating. states she has not taken her antihypertensives for several months because she no longer has a PCP. she does not have her phone and is not sure what meds she is supposed to take,  pt with hx of htn with hx of syncope, s/p ILR never followed with ?fall ?Syncope  will check ILR   repeat echo , pt with hx of AS  check orthostatic  observe on tele  tsh/ b12 level  dvt prophylaxis  tele, awaiting echo, orthostatic  pt not orthostatic , will increase losartan dose for bp contrl    	        
Date of Service: 01-06-25 @ 07:18           CARDIOLOGY     PROGRESS  NOTE   ________________________________________________    CHIEF COMPLAINT:Patient is a 84y old  Female who presents with a chief complaint of fall (05 Jan 2025 10:34)  no complain  	  REVIEW OF SYSTEMS:  CONSTITUTIONAL: No fever, weight loss, or fatigue  EYES: No eye pain, visual disturbances, or discharge  ENT:  No difficulty hearing, tinnitus, vertigo; No sinus or throat pain  NECK: No pain or stiffness  RESPIRATORY: No cough, wheezing, chills or hemoptysis; No Shortness of Breath  CARDIOVASCULAR: No chest pain, palpitations, passing out, dizziness, or leg swelling  GASTROINTESTINAL: No abdominal or epigastric pain. No nausea, vomiting, or hematemesis; No diarrhea or constipation. No melena or hematochezia.  GENITOURINARY: No dysuria, frequency, hematuria, or incontinence  NEUROLOGICAL: No headaches, memory loss, loss of strength, numbness, or tremors  SKIN: No itching, burning, rashes, or lesions   LYMPH Nodes: No enlarged glands  ENDOCRINE: No heat or cold intolerance; No hair loss  MUSCULOSKELETAL: No joint pain or swelling; No muscle, back, or extremity pain  PSYCHIATRIC: No depression, anxiety, mood swings, or difficulty sleeping  HEME/LYMPH: No easy bruising, or bleeding gums  ALLERGY AND IMMUNOLOGIC: No hives or eczema	    [x ] All others negative	  [ ] Unable to obtain    PHYSICAL EXAM:  T(C): 37.2 (01-06-25 @ 04:58), Max: 37.2 (01-06-25 @ 04:58)  HR: 75 (01-06-25 @ 04:58) (70 - 91)  BP: 163/80 (01-06-25 @ 04:58) (129/71 - 176/75)  RR: 20 (01-06-25 @ 04:58) (20 - 20)  SpO2: 94% (01-06-25 @ 04:58) (92% - 94%)  Wt(kg): --  I&O's Summary    05 Jan 2025 07:01  -  06 Jan 2025 07:00  --------------------------------------------------------  IN: 0 mL / OUT: 700 mL / NET: -700 mL        Appearance: Normal	  HEENT:   Normal oral mucosa, PERRL, EOMI	  Lymphatic: No lymphadenopathy  Cardiovascular: Normal S1 S2, No JVD, + murmurs, No edema  Respiratory: rthonchi  Psychiatry: A & O x 3, Mood & affect appropriate  Gastrointestinal:  Soft, Non-tender, + BS	  Skin: No rashes, No ecchymoses, No cyanosis	  Neurologic: Non-focal  Extremities: Normal range of motion, No clubbing, cyanosis or edema  Vascular: Peripheral pulses palpable 2+ bilaterally    MEDICATIONS  (STANDING):  amLODIPine   Tablet 2.5 milliGRAM(s) Oral daily  aspirin enteric coated 81 milliGRAM(s) Oral daily  atorvastatin 20 milliGRAM(s) Oral at bedtime  heparin   Injectable 5000 Unit(s) SubCutaneous every 8 hours  lidocaine   4% Patch 1 Patch Transdermal daily  losartan 100 milliGRAM(s) Oral daily      TELEMETRY: 	    ECG:  	  RADIOLOGY:  OTHER: 	  	  LABS:	 	    CARDIAC MARKERS:            01-06    137  |  101  |  15  ----------------------------<  115[H]  3.5   |  22  |  0.46[L]    Ca    9.3      06 Jan 2025 06:02      proBNP:   Lipid Profile:   HgA1c:   TSH: Thyroid Stimulating Hormone, Serum: 0.54 uIU/mL (01-03 @ 06:56)          Assessment and plan  ---------------------------   84F hx htn, obesity, anxiety here with coccyx, L buttock pain and L back of thigh pain since a fall 1 week ago. pt states she was walking at [employer] last week when she lost her footing and fell onto her buttock. states no LOC or headstrike. states since then experiencing symptoms to above areas when she is moving - such as getting out of bed or sitting up. she has been walking. no back pain, extremity weakness or numbness, chest pain, sob, headache, vision changes, abdominal pain, difficulties urinating. states she has not taken her antihypertensives for several months because she no longer has a PCP. she does not have her phone and is not sure what meds she is supposed to take,  pt with hx of htn with hx of syncope, s/p ILR never followed with ?fall ?Syncope  will check ILR   repeat echo , pt with hx of AS  check orthostatic  observe on tele  tsh/ b12 level  dvt prophylaxis  tele, awaiting echo, orthostatic  pt not orthostatic , will increase losartan dose for bp control  still awaiting echo with sig abnormal ecg and hx of AS  physical therapy  asa daily  high risk of falls ?MARY upon dc  still awaiting orthostatic bp and HR to adjust bp meds    	        
  date of service: 01-07-25 @ 10:16  [facility]  REVIEW OF SYSTEMS:  CONSTITUTIONAL: No fever,  no  weight loss  ENT:  No  tinnitus,   no   vertigo  NECK: No pain or stiffness  RESPIRATORY: No cough, wheezing, chills or hemoptysis;    No Shortness of Breath  CARDIOVASCULAR: No chest pain, palpitations, dizziness  GASTROINTESTINAL: No abdominal or epigastric pain. No nausea, vomiting, or hematemesis; No diarrhea  No melena or hematochezia.  GENITOURINARY: No dysuria, frequency, hematuria, or incontinence  NEUROLOGICAL: No headaches  SKIN: No itching,  no   rash  LYMPH Nodes: No enlarged glands  ENDOCRINE: No heat or cold intolerance  MUSCULOSKELETAL: No joint pain or swelling  PSYCHIATRIC: No depression, anxiety  HEME/LYMPH: No easy bruising, or bleeding gums  ALLERGY AND IMMUNOLOGIC: No hives or eczema	    MEDICATIONS  (STANDING):  amLODIPine   Tablet 5 milliGRAM(s) Oral daily  aspirin enteric coated 81 milliGRAM(s) Oral daily  atorvastatin 20 milliGRAM(s) Oral at bedtime  heparin   Injectable 5000 Unit(s) SubCutaneous every 8 hours  lidocaine   4% Patch 1 Patch Transdermal daily  losartan 100 milliGRAM(s) Oral daily    MEDICATIONS  (PRN):  acetaminophen     Tablet .. 650 milliGRAM(s) Oral every 6 hours PRN Moderate Pain (4 - 6)      Vital Signs Last 24 Hrs  T(C): 36.6 (07 Jan 2025 09:35), Max: 37 (06 Jan 2025 18:29)  T(F): 97.9 (07 Jan 2025 09:35), Max: 98.6 (06 Jan 2025 18:29)  HR: 79 (07 Jan 2025 05:58) (74 - 87)  BP: 145/74 (07 Jan 2025 05:58) (142/77 - 170/79)  BP(mean): --  RR: 18 (07 Jan 2025 05:58) (18 - 20)  SpO2: 98% (07 Jan 2025 05:58) (93% - 98%)    Parameters below as of 07 Jan 2025 05:58  Patient On (Oxygen Delivery Method): room air      CAPILLARY BLOOD GLUCOSE        I&O's Summary    06 Jan 2025 07:01  -  07 Jan 2025 07:00  --------------------------------------------------------  IN: 660 mL / OUT: 500 mL / NET: 160 mL          Appearance: Normal	  HEENT:   Normal oral mucosa, PERRL, EOMI	  Lymphatic: No lymphadenopathy  Cardiovascular: Normal S1 S2, No JVD  Respiratory: Lungs clear to auscultation	  Gastrointestinal:  Soft, Non-tender, + BS	  Skin: No rash, No ecchymoses	  Extremities:     LABS:    01-07    137  |  105  |  18  ----------------------------<  100[H]  4.6   |  20[L]  |  0.51    Ca    9.7      07 Jan 2025 06:57  Mg     2.0     01-07            Urinalysis Basic - ( 07 Jan 2025 06:57 )    Color: x / Appearance: x / SG: x / pH: x  Gluc: 100 mg/dL / Ketone: x  / Bili: x / Urobili: x   Blood: x / Protein: x / Nitrite: x   Leuk Esterase: x / RBC: x / WBC x   Sq Epi: x / Non Sq Epi: x / Bacteria: x              Thyroid Stimulating Hormone, Serum: 0.54 uIU/mL (01-03 @ 06:56)          Consultant(s) Notes Reviewed:      Care Discussed with Consultants/Other Providers:

## 2025-01-07 NOTE — DISCHARGE NOTE NURSING/CASE MANAGEMENT/SOCIAL WORK - FINANCIAL ASSISTANCE
[facility] provides services at a reduced cost to those who are determined to be eligible through [facility]’s financial assistance program. Information regarding [facility]’s financial assistance program can be found by going to [email or web address] or by calling [phone].

## 2025-01-07 NOTE — DISCHARGE NOTE NURSING/CASE MANAGEMENT/SOCIAL WORK - PATIENT PORTAL LINK FT
You can access the FollowMyHealth Patient Portal offered by [facility] by registering at the following website: [email or web address]. By joining [employer]’s FollowMyHealth portal, you will also be able to view your health information using other applications (apps) compatible with our system.